# Patient Record
Sex: FEMALE | Race: BLACK OR AFRICAN AMERICAN | NOT HISPANIC OR LATINO | Employment: FULL TIME | URBAN - METROPOLITAN AREA
[De-identification: names, ages, dates, MRNs, and addresses within clinical notes are randomized per-mention and may not be internally consistent; named-entity substitution may affect disease eponyms.]

---

## 2018-02-13 ENCOUNTER — HOSPITAL ENCOUNTER (EMERGENCY)
Facility: HOSPITAL | Age: 48
Discharge: HOME | End: 2018-02-13
Attending: EMERGENCY MEDICINE | Admitting: EMERGENCY MEDICINE

## 2018-02-13 ENCOUNTER — APPOINTMENT (OUTPATIENT)
Dept: GENERAL RADIOLOGY | Facility: HOSPITAL | Age: 48
End: 2018-02-13

## 2018-02-13 VITALS
TEMPERATURE: 98 F | HEIGHT: 64 IN | BODY MASS INDEX: 31.07 KG/M2 | WEIGHT: 182 LBS | HEART RATE: 86 BPM | DIASTOLIC BLOOD PRESSURE: 78 MMHG | OXYGEN SATURATION: 99 % | RESPIRATION RATE: 20 BRPM | SYSTOLIC BLOOD PRESSURE: 115 MMHG

## 2018-02-13 DIAGNOSIS — J20.9 ACUTE BRONCHITIS, UNSPECIFIED ORGANISM: Primary | ICD-10-CM

## 2018-02-13 LAB
FLUAV AG NPH QL: NEGATIVE
FLUBV AG NPH QL IA: NEGATIVE

## 2018-02-13 PROCEDURE — 71046 X-RAY EXAM CHEST 2 VIEWS: CPT

## 2018-02-13 PROCEDURE — 99283 EMERGENCY DEPT VISIT LOW MDM: CPT

## 2018-02-13 PROCEDURE — 87804 INFLUENZA ASSAY W/OPTIC: CPT | Performed by: NURSE PRACTITIONER

## 2018-02-13 RX ORDER — AZITHROMYCIN 250 MG/1
TABLET, FILM COATED ORAL
Qty: 6 TABLET | Refills: 0 | OUTPATIENT
Start: 2018-02-13 | End: 2018-04-12

## 2018-02-13 NOTE — ED PROVIDER NOTES
EMERGENCY DEPARTMENT ENCOUNTER    CHIEF COMPLAINT  Chief Complaint: cough   History given by: pt   History limited by: none   Room Number: 50/50  PMD: No Known Provider      HPI:  Pt is a 47 y.o. female who presents complaining of productive cough with green-yellow sputum for the past 6 days. Pt also c/o HA, body aches, chest wall pain worse with cough, and reports a sore throat that is currently resolved. Pt denies N/V, rash, SOA or swelling of extremities. Pt states a Hx of partial hysterectomy, quit smoking 5 weeks ago.     Duration:  6 days   Onset: gradual   Timing: constant   Quality: productive with green-yellow sputum   Intensity/Severity: moderate   Progression: unchanged   Associated Symptoms: body aches, chest wall pain worse with cough, and sore throat   Aggravating Factors: none stated   Alleviating Factors: none stated   Previous Episodes: none stated   Treatment before arrival: none    PAST MEDICAL HISTORY  Active Ambulatory Problems     Diagnosis Date Noted   • No Active Ambulatory Problems     Resolved Ambulatory Problems     Diagnosis Date Noted   • No Resolved Ambulatory Problems     No Additional Past Medical History       PAST SURGICAL HISTORY  Past Surgical History:   Procedure Laterality Date   • APPENDECTOMY     •  SECTION     • FOOT SURGERY Bilateral    • PARTIAL HYSTERECTOMY     • THYROID SURGERY         FAMILY HISTORY  History reviewed. No pertinent family history.    SOCIAL HISTORY  Social History     Social History   • Marital status:      Spouse name: N/A   • Number of children: N/A   • Years of education: N/A     Occupational History   • Not on file.     Social History Main Topics   • Smoking status: Former Smoker   • Smokeless tobacco: Not on file   • Alcohol use No   • Drug use: No   • Sexual activity: Defer     Other Topics Concern   • Not on file     Social History Narrative   • No narrative on file       ALLERGIES  Review of patient's allergies indicates no known  allergies.    REVIEW OF SYSTEMS  Review of Systems   Constitutional: Negative for chills and fever.   HENT: Positive for sore throat (curently resolved). Negative for trouble swallowing.    Eyes: Negative for visual disturbance.   Respiratory: Positive for cough (with green-yellow sputum). Negative for shortness of breath.    Cardiovascular: Negative for chest pain, palpitations and leg swelling.   Gastrointestinal: Negative for abdominal pain, diarrhea and vomiting.   Endocrine: Negative.    Genitourinary: Negative for decreased urine volume, dysuria and frequency.   Musculoskeletal: Positive for myalgias (chest wall). Negative for neck pain.   Skin: Negative for rash.   Allergic/Immunologic: Negative.    Neurological: Positive for headaches. Negative for syncope, weakness and numbness.   Hematological: Negative.    Psychiatric/Behavioral: Negative.    All other systems reviewed and are negative.      PHYSICAL EXAM  ED Triage Vitals   Temp Heart Rate Resp BP SpO2   02/13/18 1100 02/13/18 1100 02/13/18 1100 02/13/18 1100 02/13/18 1100   98 °F (36.7 °C) 103 16 122/81 98 %      Temp src Heart Rate Source Patient Position BP Location FiO2 (%)   -- 02/13/18 1207 02/13/18 1207 02/13/18 1100 --    Monitor Sitting Right arm        Physical Exam   Constitutional: She is oriented to person, place, and time.  Non-toxic appearance. She appears distressed (mild).   HENT:   Head: Normocephalic and atraumatic.   Mouth/Throat: Oropharynx is clear and moist and mucous membranes are normal.   Eyes: EOM are normal.   Neck: Normal range of motion. Neck supple.   Cardiovascular: Normal rate, regular rhythm, normal heart sounds and intact distal pulses.    No murmur heard.  Pulses:       Posterior tibial pulses are 2+ on the right side, and 2+ on the left side.   Pulmonary/Chest: Effort normal and breath sounds normal. No respiratory distress.   O2 sats of 100% on room air   Abdominal: Soft. Bowel sounds are normal. There is no  tenderness. There is no rebound and no guarding.   Musculoskeletal: Normal range of motion. She exhibits no edema.   Neurological: She is alert and oriented to person, place, and time.   Skin: Skin is warm and dry.   Psychiatric: Affect normal.   Nursing note and vitals reviewed.      LAB RESULTS  Lab Results (last 24 hours)     Procedure Component Value Units Date/Time    Influenza Antigen, Rapid - Swab, Nasopharynx [[id number]]  (Normal) Collected:  02/13/18 1150    Specimen:  Swab from Nasopharynx Updated:  02/13/18 1237     Influenza A Ag, EIA Negative     Influenza B Ag, EIA Negative          I ordered the above labs and reviewed the results    RADIOLOGY  XR Chest 2 View   Final Result   No evidence for acute pulmonary process. Follow-up as   clinical indications persist.       This report was finalized on 2/13/2018 12:23 PM by Dr. Jorge Bass MD.               I ordered the above noted radiological studies. Interpreted by radiologist. Reviewed by me in PACS.     PROCEDURES  Procedures      PROGRESS AND CONSULTS  ED Course   1227  Discussed that we are waiting on the pt's CXR read and flu swab result but impression of viral illness. Plan to d/c the pt. Advised rest and hydration. Will prescribe the pt antibiotics due to her history of smoking. Pt understands and agrees with the plan, and all questions were answered.   1238  Pt's labs and CXR results are negative. Will d/c the pt as planned.     MEDICAL DECISION MAKING  Results were reviewed/discussed with the patient and they were also made aware of online access. Pt also made aware that some labs, such as cultures, will not be resulted during ER visit and follow up with PMD is necessary.     MDM  Number of Diagnoses or Management Options  Acute bronchitis, unspecified organism:      Amount and/or Complexity of Data Reviewed  Clinical lab tests: reviewed and ordered (Flu swab: negative)  Tests in the radiology section of CPT®: ordered and reviewed (CXR:  negative)    Patient Progress  Patient progress: stable         DIAGNOSIS  Final diagnoses:   Acute bronchitis, unspecified organism       DISPOSITION  DISCHARGE    Patient discharged in stable condition.    Reviewed implications of results, diagnosis, meds, responsibility to follow up, warning signs and symptoms of possible worsening, potential complications and reasons to return to ER.    Patient/Family voiced understanding of above instructions.    Discussed plan for discharge, as there is no emergent indication for admission.  Pt/family is agreeable and understands need for follow up and repeat testing.  Pt is aware that discharge does not mean that nothing is wrong but it indicates no emergency is present that requires admission and they must continue care with follow-up as given below or physician of their choice.     FOLLOW-UP  [place] [address]  [phone]  Call in 1 day  to set up a follow up appointment         Medication List      New Prescriptions          azithromycin 250 MG tablet   Commonly known as:  ZITHROMAX   Take 2 tablets the first day, then 1 tablet daily for 4 days.               Latest Documented Vital Signs:  As of 12:40 PM  BP- 118/86 HR- 82 Temp- 98 °F (36.7 °C) O2 sat- 99%    --  Documentation assistance provided by nasrin Rodriguez for Dr. Birch.  Information recorded by the scribjerry was done at my direction and has been verified and validated by me.     Trevor Rodriguez  02/13/18 1242  f     Allyson Birch MD  02/14/18 0015

## 2018-02-13 NOTE — DISCHARGE INSTRUCTIONS
You are advised to follow closely with your [facility] in 2-3 days for recheck, final results of lab work and imaging testing, and further testing/treatment as needed.      Drink pelnty of fluids  Alternate tylenol/ibuprofen as needed for fever/body aches    Please return to the emergency department immediately with chest pain different than usual for you, shortness of air, abdominal pain, persistent vomiting/fever, blood in emesis or stool, lightheadedness/fainting, problems with speech, one sided weakness/numbness, new incontinence, problems with vision, fever, nausea, vomiting or for worsening of symptoms or other concerns.

## 2018-02-13 NOTE — ED NOTES
"Patient states \"My nose is burning, my chest is congested and I've been coughing up a lot of phlegm and having hot flashes since Thursday.\"     Catalina Bender RN  02/13/18 1100    "

## 2018-02-13 NOTE — ED NOTES
Pt states since last Thursday she has had a headache, sore throat, and she is coughing up phlegm.   States that she is coughing up green phlegm.   States that her chest will hurt when she starts coughing.      Eduar Melgar RN  02/13/18 [id number]

## 2018-03-09 ENCOUNTER — HOSPITAL ENCOUNTER (EMERGENCY)
Facility: HOSPITAL | Age: 48
Discharge: HOME | End: 2018-03-09
Attending: EMERGENCY MEDICINE | Admitting: EMERGENCY MEDICINE

## 2018-03-09 ENCOUNTER — APPOINTMENT (OUTPATIENT)
Dept: GENERAL RADIOLOGY | Facility: HOSPITAL | Age: 48
End: 2018-03-09

## 2018-03-09 VITALS
WEIGHT: 183 LBS | RESPIRATION RATE: 16 BRPM | BODY MASS INDEX: 31.24 KG/M2 | SYSTOLIC BLOOD PRESSURE: 106 MMHG | HEIGHT: 64 IN | HEART RATE: 88 BPM | OXYGEN SATURATION: 98 % | TEMPERATURE: 98.3 F | DIASTOLIC BLOOD PRESSURE: 78 MMHG
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
## 2018-03-09 DIAGNOSIS — T24.211A PARTIAL THICKNESS BURN OF RIGHT HIP, INITIAL ENCOUNTER: Primary | ICD-10-CM

## 2018-03-09 DIAGNOSIS — S80.02XA CONTUSION OF LEFT KNEE, INITIAL ENCOUNTER: ICD-10-CM

## 2018-03-09 PROCEDURE — 73560 X-RAY EXAM OF KNEE 1 OR 2: CPT

## 2018-03-09 PROCEDURE — 90715 TDAP VACCINE 7 YRS/> IM: CPT | Performed by: EMERGENCY MEDICINE

## 2018-03-09 PROCEDURE — 25010000002 TDAP 5-2.5-18.5 LF-MCG/0.5 SUSPENSION: Performed by: EMERGENCY MEDICINE

## 2018-03-09 PROCEDURE — 99284 EMERGENCY DEPT VISIT MOD MDM: CPT

## 2018-03-09 PROCEDURE — 90471 IMMUNIZATION ADMIN: CPT | Performed by: EMERGENCY MEDICINE

## 2018-03-09 RX ORDER — OXYCODONE HYDROCHLORIDE AND ACETAMINOPHEN 5; 325 MG/1; MG/1
1 TABLET ORAL EVERY 4 HOURS PRN
Qty: 14 TABLET | Refills: 0 | Status: SHIPPED | OUTPATIENT
Start: 2018-03-09 | End: 2018-04-25

## 2018-03-09 RX ORDER — OXYCODONE HYDROCHLORIDE AND ACETAMINOPHEN 5; 325 MG/1; MG/1
2 TABLET ORAL ONCE
Status: COMPLETED | OUTPATIENT
Start: 2018-03-09 | End: 2018-03-09

## 2018-03-09 RX ADMIN — TETANUS TOXOID, REDUCED DIPHTHERIA TOXOID AND ACELLULAR PERTUSSIS VACCINE, ADSORBED 0.5 ML: 5; 2.5; 8; 8; 2.5 SUSPENSION INTRAMUSCULAR at 17:22

## 2018-03-09 RX ADMIN — OXYCODONE HYDROCHLORIDE AND ACETAMINOPHEN 2 TABLET: 5; 325 TABLET ORAL at 16:24

## 2018-03-09 RX ADMIN — SILVER SULFADIAZINE 1 APPLICATION: 10 CREAM TOPICAL at 16:45

## 2018-03-09 NOTE — ED PROVIDER NOTES
EMERGENCY DEPARTMENT ENCOUNTER    CHIEF COMPLAINT  Chief Complaint: Hip injury  History given by: Pt  History limited by: nothing  Room Number: 50/50  PMD: No Known Provider      HPI:  Pt is a 47 y.o. female who presents complaining of R hip burn and left knee pain while at work at here at [employer]. Pt states that she was heating up lasagna, it fell and pt tried to catch it. The lasagna fell and burnt her R hip. She then slipped and fell, landing on her L knee. Pt states that her tetanus shot has been more than ten years ago.       PAST MEDICAL HISTORY  Active Ambulatory Problems     Diagnosis Date Noted   • No Active Ambulatory Problems     Resolved Ambulatory Problems     Diagnosis Date Noted   • No Resolved Ambulatory Problems     No Additional Past Medical History       PAST SURGICAL HISTORY  Past Surgical History:   Procedure Laterality Date   • APPENDECTOMY     •  SECTION     • FOOT SURGERY Bilateral    • PARTIAL HYSTERECTOMY     • THYROID SURGERY         FAMILY HISTORY  History reviewed. No pertinent family history.    SOCIAL HISTORY  Social History     Social History   • Marital status:      Spouse name: N/A   • Number of children: N/A   • Years of education: N/A     Occupational History   • Not on file.     Social History Main Topics   • Smoking status: Former Smoker   • Smokeless tobacco: Not on file   • Alcohol use No   • Drug use: No   • Sexual activity: Defer     Other Topics Concern   • Not on file     Social History Narrative       ALLERGIES  Review of patient's allergies indicates no known allergies.        REVIEW OF SYSTEMS  Review of Systems   Constitutional: Negative for fever.   Respiratory: Negative for shortness of breath.    Cardiovascular: Negative for chest pain.   Musculoskeletal: Positive for arthralgias (L knee pain).   Skin: Positive for wound (R hip burn).     PHYSICAL EXAM  ED Triage Vitals   Temp Heart Rate Resp BP SpO2   18 1500 18 1500 18  1508 03/09/18 1525 03/09/18 1500   98.3 °F (36.8 °C) 101 20 108/84 99 %      Temp src Heart Rate Source Patient Position BP Location FiO2 (%)   03/09/18 1500 03/09/18 1525 03/09/18 1525 03/09/18 1525 --   Tympanic Monitor Lying Right arm        Physical Exam   Constitutional: She is oriented to person, place, and time. She appears distressed (mild).   Eyes: EOM are normal.   Neck: Normal range of motion.   Cardiovascular: Normal rate and regular rhythm.    Pulmonary/Chest: Effort normal and breath sounds normal. No respiratory distress.   Musculoskeletal:        Left hip: She exhibits no tenderness and no bony tenderness.        Left knee: She exhibits swelling. Tenderness (anterior) found.   No calf tenderness  Pain with flexion of L knee   Neurological: She is alert and oriented to person, place, and time. She has normal sensation and normal strength. She has a normal Straight Leg Raise Test (L).   Skin: Skin is warm and dry. Abrasion and burn ([id number] superficial partial thickness) noted.   Small abrasion to anterior L knee.    Psychiatric: Affect normal.   Nursing note and vitals reviewed.      RADIOLOGY  XR Knee 1 or 2 View Left              I ordered the above noted radiological studies. Interpreted by radiologist. Reviewed by me in PACS.       PROCEDURES  Procedures      PROGRESS AND CONSULTS/MEDICAL DECISION MAKING  ED Course     1530:  Ordered XR L knee for further evaluation.     1548:  Notified pt of plan to order pain medication and imaging for further evaluation. Pt understands and agrees with the plan, all questions answered.     1559:  Ordered silvadene and percocet for pt's pain.     1616:  Ordered Kristin report prior to disposition.     1619:  Ordered hinged knee brace. Per RN, pt does not want crutches.     1631:  KRISTIN query complete. Treatment plan to include limited course of prescribed  controlled substance. Risks including addiction, benefits, and alternatives presented to patient. KRISTIN is  negative.    1632:  Pt rechecked. Pt resting comfortably and pain has improved since administration of pain medication. Notified pt of unremarkable imaging results and plan to discharge pt home with pain medication. Pt understands and agrees with the plan, all questions answered.    Results were reviewed/discussed with the patient and they were also made aware of online access. Pt also made aware that some labs, such as cultures, will not be resulted during ER visit and follow up with PMD is necessary.     MDM  Number of Diagnoses or Management Options  Contusion of left knee, initial encounter:   Fall, initial encounter:   Partial thickness burn of right hip, initial encounter:      Amount and/or Complexity of Data Reviewed  Tests in the radiology section of CPT®: ordered and reviewed (XR L knee shows no acute findings. )  Independent visualization of images, tracings, or specimens: yes    Patient Progress  Patient progress: stable           DIAGNOSIS  Final diagnoses:   Partial thickness burn of right hip, initial encounter   Contusion of left knee, initial encounter   Fall, initial encounter         DISPOSITION  DISCHARGE    Patient discharged in stable condition.    Reviewed implications of results, diagnosis, meds, responsibility to follow up, warning signs and symptoms of possible worsening, potential complications and reasons to return to ER.    Patient/Family voiced understanding of above instructions.    Discussed plan for discharge, as there is no emergent indication for admission.  Pt/family is agreeable and understands need for follow up and repeat testing.  Pt is aware that discharge does not mean that nothing is wrong but it indicates no emergency is present that requires admission and they must continue care with follow-up as given below or physician of their choice.     FOLLOW-UP  [facility]    Schedule an appointment as soon as possible for a visit in 3 days           Medication List      New  Prescriptions          oxyCODONE-acetaminophen 5-325 MG per tablet   Commonly known as:  PERCOCET   Take 1 tablet by mouth Every 4 (Four) Hours As Needed for Severe Pain .       silver sulfadiazine 1 % cream   Commonly known as:  SILVADENE, SSD   Apply  topically 2 (Two) Times a Day.         Stop          azithromycin 250 MG tablet   Commonly known as:  ZITHROMAX               Latest Documented Vital Signs:  As of 3:44 PM  BP- 108/84 HR- 92 Temp- 98.3 °F (36.8 °C) (Tympanic) O2 sat- 99%  --  Documentation assistance provided by nasrin Crespo for Dr. Mc.  Information recorded by the scribe was done at my direction and has been verified and validated by me.     Consuelo Crespo  03/09/18 5529       Mu Mc MD  03/09/18 7339

## 2018-03-09 NOTE — DISCHARGE INSTRUCTIONS
Use ice on your knee as needed.  Use silvadene on your burn twice a day and keep the wound clean, dry and covered.  Use pain medication as needed for pain. You can use Ibuprofen on top of or in place of the percocet.

## 2018-03-09 NOTE — ED TRIAGE NOTES
Pt states she was pushing taraa on the rack and it fell she tried to catch it and then she slipped on it. Reports left knee pain and rt hip burning, mild burn quarter size on right hip. Denies head injury

## 2018-03-30 ENCOUNTER — TRANSCRIBE ORDERS (OUTPATIENT)
Dept: ADMINISTRATIVE | Facility: HOSPITAL | Age: 48
End: 2018-03-30

## 2018-03-30 DIAGNOSIS — M25.562 LEFT KNEE PAIN, UNSPECIFIED CHRONICITY: Primary | ICD-10-CM

## 2018-04-11 ENCOUNTER — HOSPITAL ENCOUNTER (OUTPATIENT)
Dept: MRI IMAGING | Facility: HOSPITAL | Age: 48
Discharge: HOME | End: 2018-04-11
Admitting: PHYSICAL MEDICINE & REHABILITATION

## 2018-04-11 DIAGNOSIS — M25.562 LEFT KNEE PAIN, UNSPECIFIED CHRONICITY: ICD-10-CM

## 2018-04-11 PROCEDURE — 73721 MRI JNT OF LWR EXTRE W/O DYE: CPT

## 2018-04-25 ENCOUNTER — OFFICE VISIT (OUTPATIENT)
Dept: ORTHOPEDIC SURGERY | Facility: CLINIC | Age: 48
End: 2018-04-25

## 2018-04-25 VITALS — WEIGHT: 186 LBS | BODY MASS INDEX: 31.76 KG/M2 | HEIGHT: 64 IN | TEMPERATURE: 98.1 F

## 2018-04-25 DIAGNOSIS — S89.92XA INJURY OF LEFT KNEE, INITIAL ENCOUNTER: Primary | ICD-10-CM

## 2018-04-25 PROCEDURE — 99203 OFFICE O/P NEW LOW 30 MIN: CPT | Performed by: ORTHOPAEDIC SURGERY

## 2018-04-25 RX ORDER — TRAMADOL HYDROCHLORIDE 50 MG/1
50 TABLET ORAL EVERY 12 HOURS PRN
Qty: 40 TABLET | Refills: 0 | OUTPATIENT
Start: 2018-04-25 | End: 2018-09-21

## 2018-04-25 RX ORDER — NAPROXEN SODIUM 220 MG
220 TABLET ORAL 2 TIMES DAILY PRN
COMMUNITY
End: 2019-10-11

## 2018-04-25 NOTE — PROGRESS NOTES
Chief Complaint   Patient presents with   • Left Knee - Establish Care             HPI patient here for new patient appt for left knee Which she injured while she was at work.  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  Her date of injury was 2018.  She slipped on the floor and fell directly on the anterior aspect of the knee.  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  She developed significant swelling and bruising anteriorly.  She finds it difficult to bear weight with the knee in full extension.  The patient states that she has a burning type sensation in the retropatellar region.  She has difficulty in kneeling on the ground.  She does not have any instability of the knee.  Most of the pain is over the anterior aspect of the knee.  She's had an MRI which does not show any injury to the ACL or the MCL.          No Known Allergies      Social History     Social History   • Marital status:      Spouse name: N/A   • Number of children: N/A   • Years of education: N/A     Occupational History   • Not on file.     Social History Main Topics   • Smoking status: Former Smoker   • Smokeless tobacco: Never Used   • Alcohol use No   • Drug use: No   • Sexual activity: Defer     Other Topics Concern   • Not on file     Social History Narrative   • No narrative on file       History reviewed. No pertinent family history.    Past Surgical History:   Procedure Laterality Date   • APPENDECTOMY     •  SECTION     • FOOT SURGERY Bilateral    • PARTIAL HYSTERECTOMY     • THYROID SURGERY         History reviewed. No pertinent past medical history.        Vitals:    18 0924   Temp: 98.1 °F (36.7 °C)             Review of Systems   Constitutional: Negative.  Negative for chills, fever and unexpected weight change.   HENT: Negative.  Negative for trouble swallowing and voice change.    Eyes:  Negative.  Negative for visual disturbance.   Respiratory: Negative.  Negative for cough and shortness of breath.    Cardiovascular: Negative.  Negative for chest pain and leg swelling.   Gastrointestinal: Negative.  Negative for abdominal pain, nausea and vomiting.   Endocrine: Negative.  Negative for cold intolerance and heat intolerance.   Genitourinary: Negative.  Negative for difficulty urinating, frequency and urgency.   Musculoskeletal: Positive for gait problem and joint swelling.   Skin: Negative.  Negative for rash and wound.   Allergic/Immunologic: Negative.  Negative for immunocompromised state.   Neurological: Negative for weakness and numbness.   Hematological: Negative.  Does not bruise/bleed easily.   Psychiatric/Behavioral: Positive for sleep disturbance. Negative for dysphoric mood. The patient is not nervous/anxious.            Physical Exam   Constitutional: She is oriented to person, place, and time. She appears well-developed and well-nourished.   HENT:   Head: Normocephalic and atraumatic.   Right Ear: External ear normal.   Left Ear: External ear normal.   Nose: Nose normal.   Mouth/Throat: Oropharynx is clear and moist.   Eyes: Conjunctivae and EOM are normal. Pupils are equal, round, and reactive to light.   Neck: Normal range of motion. Neck supple.   Cardiovascular: Normal rate, regular rhythm, normal heart sounds and intact distal pulses.    Pulmonary/Chest: Effort normal and breath sounds normal.   Abdominal: Soft. Bowel sounds are normal.   Musculoskeletal: Normal range of motion.   Neurological: She is alert and oriented to person, place, and time. She has normal reflexes.   Skin: Skin is warm.   Psychiatric: She has a normal mood and affect. Her behavior is normal. Judgment and thought content normal.   Nursing note and vitals reviewed.              Joint/Body Part Specific Exam:  left knee. The knee is swollen. The patella is ballotable. There is thickening of the synovial membrane.  There appears to be a fluid flow in the supra-patellar space. The patient's knee feels tight in flexion. Range of motion is restricted because of the limited flexion. There is some quadriceps inhibition on account of the distension of the joint. There is diffuse tenderness around the knee. The popliteal fossa is full and tender as well. No evidence of a compartmental syndrome is noted. Anterior and posterior drawer signs are negative. No medial or lateral instability is noted. Pivot shift sign is negative. Dorsalis pedis and posterior tibial artery pulses are palpable. Common peroneal nerve function is well preserved.  She has a boggy swelling over the anterior aspect of the knee over the prepatellar bursa.  There is some bruising present in this location as well.  This bruise seems to be resolving.          X-RAY Report:  X-rays AP and lateral reviewed from the Epic systems show a fairly normal contour of the knee joint.  There is no fracture noted.  There is some increased fluid in the prepatellar region.          Diagnostics:  Grover reports and images from the hospital are reviewed.  There is some edema in the subcutaneous fat over the knee with a poorly demarcated signal.  There is a hematoma in this location which is 27 mm x 23 mm x 9 mm.  There is chronic chondral loss of the patella.  The medial and lateral menisci are intact.  There is no evidence of a tear of the ACL or the MCL.  Conservative line of management approach has been proposed to the patient based on the findings of the images on the MRI.          Eduarda was seen today for establish care.    Diagnoses and all orders for this visit:    Injury of left knee, initial encounter  -     Ambulatory Referral to Physical Therapy Evaluate and treat    Other orders  -     traMADol (ULTRAM) 50 MG tablet; Take 1 tablet by mouth Every 12 (Twelve) Hours As Needed for Moderate Pain .            Procedures          I provided this patient with educational  materials regarding exercise and bone health.        Plan:   Nonoperative care discussed with the patient.    Stretching and strengthening exercises of the quads and the hamstrings.    Note for physical therapy given to the patient on an outpatient basis.    Local application of an Ace wrap to the area to decrease the swelling.    Ice to the knee for comfort.    Tablet ibuprofen 600 mg orally twice a day for pain swelling and discomfort.    Tablet Ultram 50 mg tab 1 by mouth every 12 for breakthrough pain and discomfort.    Re-injury precautions.    Note for this patient to be off work given to her because she states that she cannot go back to work with the knee in its present condition.    Follow-up in my office in 4 weeks for reevaluation.    Controlled substance treatment options discussed in detail. Patient's signed consent to medical options on file. KRISTIN form in chart.  The patient is being provided this narcotic prescription to address the acute medical condition that they are undergoing/experiencing at this time.  It is my medical and surgical assessment that more than 3 days of narcotic medication is necessary to help control the pain and discomfort that this patient is experiencing at this point.  Risks of narcotic medication usage outlined.  Possibility of physical and psychological dependence and abuse, especially long term, emphasized to the patient.  I have explained to the patient that we will try to wean them off the narcotic medication as soon as possible and introduce non-narcotic modalities of pain control.  At this point in the patient's clinical spectrum, however, alternative available treatments are inadequate to control their pain and symptoms.  I have also discussed the possibility of random drug testing as well as pill counts to prevent misuse and misappropriation of the narcotic medications prescribed to the patient.            CC To No Known Provider

## 2018-05-02 ENCOUNTER — TREATMENT (OUTPATIENT)
Dept: PHYSICAL THERAPY | Facility: CLINIC | Age: 48
End: 2018-05-02

## 2018-05-02 DIAGNOSIS — M25.562 ACUTE PAIN OF LEFT KNEE: Primary | ICD-10-CM

## 2018-05-02 PROCEDURE — 97110 THERAPEUTIC EXERCISES: CPT | Performed by: PHYSICAL THERAPIST

## 2018-05-02 PROCEDURE — 97001 PR PHYS THERAPY EVALUATION: CPT | Performed by: PHYSICAL THERAPIST

## 2018-05-02 PROCEDURE — 97014 ELECTRIC STIMULATION THERAPY: CPT | Performed by: PHYSICAL THERAPIST

## 2018-05-02 NOTE — PROGRESS NOTES
Physical Therapy Initial Evaluation and Plan of Care        Subjective Evaluation    History of Present Illness  Mechanism of injury: Patient presents to therapy with left knee pain which she injured while she was at work.  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  She fell directly onto her knee when pulling a  tall boy and 11 trays of lasagna.   She developed significant swelling and bruising anteriorly.  She reports a burning sensation at the base of her patella that is constant in nature. She reports that she is having a difficult time walking as needed as well as stairclimbing and squatting.       Patient Occupation: cook at [employer]    Precautions and Work Restrictions: Not currently workingQuality of life: good    Pain  Quality: throbbing, sharp and burning  Relieving factors: ice (has been having trouble filling her prescription)  Aggravating factors: squatting, prolonged positioning, sleeping, repetitive movement, movement, standing and ambulation  Progression: worsening    Treatments  Previous treatment: medication  Patient Goals  Patient goals for therapy: independence with ADLs/IADLs, increased strength, return to work, return to sport/leisure activities, increased motion and decreased pain             Objective       Palpation     Additional Palpation Details  hypersensitive at mid patella    Tenderness   Left Knee   Tenderness in the lateral patella, medial patella and patellar tendon.     Active Range of Motion   Left Knee   Flexion: 80 degrees with pain  Extension: 0 degrees     Strength/Myotome Testing     Left Knee   Quadriceps contraction: fair    Tests     Additional Tests Details  Unable to perform formalized special testing due to hypersensitivity at knee.     Ambulation   Weight-Bearing Status   Weight-Bearing Status (Left): full weight bearing   Assistive device used: none    Additional Weight-Bearing Status Details  Mild decrease in stance  time on left.          Assessment & Plan     Assessment  Impairments: abnormal or restricted ROM, activity intolerance, lacks appropriate home exercise program and pain with function  Assessment details: Patient is a 47 year old female who presents with increased left knee pain.  Upon assessment she has mild decrease in stance time during ambulation on the left.  She has 80 degrees knee flexion in supine and >90 degrees in sitting. Full extension in supine and long sit with no subjective pain.   Fair quad recruitment on left.  She was hypersensitive at patella with palpation. Special testing was not performed at initial evaluation due to hypersensitivity.    Prognosis: good  Prognosis details: Long Term Goals (6 weeks)    Patient is able to return to work/community activities with minimal to no discomfort  Patient is able to lift 45 lbs from floor to waist  Patient is able to push/pull 50 lbs for 100 feet.   Patient is able stand for as long as needed for work/community related tasks.   Patient is able to sit for as long as needed for work/community related tasks.   Patient is able to reciprocally climb steps as needed for daily tasks.       Short Term Goals (3 weeks)    Patient is independent with HEP  Patient is able to sleep without being disrupted by pain.   Patient has full AROM/PROM of left knee  Patient has greater than 50% improvement overall.                 Functional Limitations: lifting, sleeping, walking, uncomfortable because of pain, standing and stooping  Plan  Therapy options: will be seen for skilled physical therapy services  Planned modality interventions: TENS, ultrasound, thermotherapy (hydrocollator packs) and cryotherapy  Planned therapy interventions: manual therapy, soft tissue mobilization, strengthening, stretching, therapeutic activities, joint mobilization, home exercise program, functional ROM exercises, flexibility and body mechanics training  Treatment plan discussed with:  patient          Therapeutic Exercise:    20     mins  17475;       Timed Treatment:   20   mins   Total Treatment:     55   mins    PT SIGNATURE: Akila Rosas, PT   DATE TREATMENT INITIATED: 5/2/2018    Initial Certification  Certification Period: 7/31/2018  I certify that the therapy services are furnished while this patient is under my care.  The services outlined above are required by this patient, and will be reviewed every 90 days.     PHYSICIAN: Noman Salamanca MD      DATE:     Please sign and return via fax to [phone].. Thank you, [facility].

## 2018-05-04 ENCOUNTER — TREATMENT (OUTPATIENT)
Dept: PHYSICAL THERAPY | Facility: CLINIC | Age: 48
End: 2018-05-04

## 2018-05-04 DIAGNOSIS — M25.562 ACUTE PAIN OF LEFT KNEE: Primary | ICD-10-CM

## 2018-05-04 PROCEDURE — 97014 ELECTRIC STIMULATION THERAPY: CPT | Performed by: PHYSICAL THERAPIST

## 2018-05-04 PROCEDURE — 97110 THERAPEUTIC EXERCISES: CPT | Performed by: PHYSICAL THERAPIST

## 2018-05-04 NOTE — PROGRESS NOTES
Physical Therapy Daily Progress Note            Subjective   Same. Pain with prolonged sit    Objective   See Exercise, Manual, and Modality Logs for complete treatment.       Assessment/Plan      No notable improvement subjectively but able to progress strengthening without increase in overall pain    Continue to progress as aileen per POC           Manual Therapy:    0     mins  17049;  Therapeutic Exercise:    24     mins  97126;     Neuromuscular Rodney:    0    mins  93269;    Therapeutic Activity:     0     mins  03081;     Gait Trainin     mins  27979;     Ultrasound:     0     mins  53529;    Work Hardening           0      mins 34187  Iontophoresis               0   mins 90613    Timed Treatment:   24   mins   Total Treatment:     43   mins    Leonora Yoder PT  Physical Therapist  
Private Vehicle

## 2018-05-07 ENCOUNTER — TREATMENT (OUTPATIENT)
Dept: PHYSICAL THERAPY | Facility: CLINIC | Age: 48
End: 2018-05-07

## 2018-05-07 PROCEDURE — 97014 ELECTRIC STIMULATION THERAPY: CPT | Performed by: PHYSICAL THERAPIST

## 2018-05-07 PROCEDURE — 97530 THERAPEUTIC ACTIVITIES: CPT | Performed by: PHYSICAL THERAPIST

## 2018-05-07 PROCEDURE — 97110 THERAPEUTIC EXERCISES: CPT | Performed by: PHYSICAL THERAPIST

## 2018-05-07 NOTE — PROGRESS NOTES
Physical Therapy Daily Progress Note            Subjective   Doing alright    Objective   See Exercise, Manual, and Modality Logs for complete treatment.   No notable antalgia    Assessment/Plan    Good progress as able to advance ex including WB without c/o inc pain.  Required mod cueing for proper squat and pivot transfer    Continue to progress as aileen             Manual Therapy:    0     mins  97316;  Therapeutic Exercise:    25     mins  90780;     Neuromuscular Rodney:    0    mins  00895;    Therapeutic Activity:     10     mins  00403;     Gait Trainin     mins  90892;     Ultrasound:     0     mins  44775;    Work Hardening           0      mins 99526  Iontophoresis               0   mins 17248    Timed Treatment:   35   mins   Total Treatment:     53   mins    Leonora Yoder PT  Physical Therapist

## 2018-05-11 ENCOUNTER — TREATMENT (OUTPATIENT)
Dept: PHYSICAL THERAPY | Facility: CLINIC | Age: 48
End: 2018-05-11

## 2018-05-11 DIAGNOSIS — M25.562 ACUTE PAIN OF LEFT KNEE: Primary | ICD-10-CM

## 2018-05-11 PROCEDURE — 97530 THERAPEUTIC ACTIVITIES: CPT | Performed by: PHYSICAL THERAPIST

## 2018-05-11 PROCEDURE — 97110 THERAPEUTIC EXERCISES: CPT | Performed by: PHYSICAL THERAPIST

## 2018-05-11 PROCEDURE — 97014 ELECTRIC STIMULATION THERAPY: CPT | Performed by: PHYSICAL THERAPIST

## 2018-05-11 NOTE — PROGRESS NOTES
Physical Therapy Daily Progress Note            Subjective   I'm in pain right here (inf pat).  Hurts to touch it.    Objective   See Exercise, Manual, and Modality Logs for complete treatment.       Assessment/Plan    Able to complete more reps than instructed on most ex despite report of increased pain at patella    Progress as aileen             Manual Therapy:    0     mins  08985;  Therapeutic Exercise:    25     mins  76319;     Neuromuscular Rodney:     0    mins  16174;    Therapeutic Activity:     8     mins  22053;     Gait Trainin     mins  45336;     Ultrasound:     0     mins  84459;    Work Hardening           0      mins 32687  Iontophoresis               0   mins 06847    Timed Treatment:   33   mins   Total Treatment:     51   mins    Leonora Yoder, PT  Physical Therapist

## 2018-05-14 ENCOUNTER — TREATMENT (OUTPATIENT)
Dept: PHYSICAL THERAPY | Facility: CLINIC | Age: 48
End: 2018-05-14

## 2018-05-14 DIAGNOSIS — M25.562 ACUTE PAIN OF LEFT KNEE: Primary | ICD-10-CM

## 2018-05-14 PROCEDURE — 97530 THERAPEUTIC ACTIVITIES: CPT | Performed by: PHYSICAL THERAPIST

## 2018-05-14 PROCEDURE — 97014 ELECTRIC STIMULATION THERAPY: CPT | Performed by: PHYSICAL THERAPIST

## 2018-05-14 PROCEDURE — 97110 THERAPEUTIC EXERCISES: CPT | Performed by: PHYSICAL THERAPIST

## 2018-05-14 NOTE — PROGRESS NOTES
Physical Therapy Daily Progress Note            Subjective   Knee hurt while sitting in [employer] yesterday    Objective   See Exercise, Manual, and Modality Logs for complete treatment.       Assessment/Plan    Performed all ex without c/o pain, including with increased step height but continues with subjective report of mod-severe pain with ADLs    Continue to progress as aileen - try balance activities             Manual Therapy:    0     mins  29339;  Therapeutic Exercise:    24     mins  75853;     Neuromuscular Rodney:    0    mins  22134;    Therapeutic Activity:     9     mins  17013;     Gait Trainin     mins  15248;     Ultrasound:     0     mins  19678;    Work Hardening           0      mins 29554  Iontophoresis               0   mins 14182    Timed Treatment:   33   mins   Total Treatment:     51   mins    Leonora Yoder PT  Physical Therapist

## 2018-05-18 ENCOUNTER — TREATMENT (OUTPATIENT)
Dept: PHYSICAL THERAPY | Facility: CLINIC | Age: 48
End: 2018-05-18

## 2018-05-18 DIAGNOSIS — M25.562 ACUTE PAIN OF LEFT KNEE: Primary | ICD-10-CM

## 2018-05-18 PROCEDURE — 97014 ELECTRIC STIMULATION THERAPY: CPT | Performed by: PHYSICAL THERAPIST

## 2018-05-18 PROCEDURE — 97110 THERAPEUTIC EXERCISES: CPT | Performed by: PHYSICAL THERAPIST

## 2018-05-18 NOTE — PROGRESS NOTES
Physical Therapy Daily Progress Note            Subjective   Pain is a 12 today vs a 10.  Sat on the couch all day yesterday. Reports no problem with exercise increases last session.    Objective   See Exercise, Manual, and Modality Logs for complete treatment.       Assessment/Plan    Severe pain persists per subjective report yet able to complete all exercises except deferred squat quickly without notable pain     Assess for ret to MD next session             Manual Therapy:    0     mins  56095;  Therapeutic Exercise:    29     mins  03054;     Neuromuscular Rodney:    0    mins  54472;    Therapeutic Activity:     0     mins  35280;     Gait Trainin     mins  25137;     Ultrasound:     0     mins  72173;    Work Hardening           0      mins 06439  Iontophoresis               0   mins 07709    Timed Treatment:   29   mins   Total Treatment:     49   mins    Leonora Yoder PT  Physical Therapist

## 2018-05-21 ENCOUNTER — TREATMENT (OUTPATIENT)
Dept: PHYSICAL THERAPY | Facility: CLINIC | Age: 48
End: 2018-05-21

## 2018-05-21 PROCEDURE — 97110 THERAPEUTIC EXERCISES: CPT | Performed by: PHYSICAL THERAPIST

## 2018-05-21 PROCEDURE — 97014 ELECTRIC STIMULATION THERAPY: CPT | Performed by: PHYSICAL THERAPIST

## 2018-05-21 PROCEDURE — 97530 THERAPEUTIC ACTIVITIES: CPT | Performed by: PHYSICAL THERAPIST

## 2018-05-21 NOTE — PROGRESS NOTES
Physical Therapy  Progress Note        2018  Noman Salamanca MD    Re: Christy Verner  ________________________________________________________________    Ms. Christy Verner, has attended 7/9 PT sessions.  Treatment has consisted of: progressive there-ex/act, HEP, pt ed and modalities     S: Ms. Christy Verner states: knee is about the same; pain with any prolonged walking        Subjective Evaluation    Pain  At best pain ratin  At worst pain ratin  Location: supra-pat  Quality: burning           Objective       Palpation     Additional Palpation Details  hypersensitive at mid patella    Tenderness   Left Knee   Tenderness in the patellar tendon. No tenderness in the lateral patella and medial patella.     Active Range of Motion   Left Knee   Flexion: 118 degrees with pain  Extension: 0 degrees     Patellar Mobility   Left Knee Hypomobile in the left medial, left lateral, left superior and left inferior patellar tendon(s).     Additional Patellar Mobility Details  Apprehensive    Strength/Myotome Testing     Left Knee   Flexion: 5  Extension: 4+  Quadriceps contraction: fair    Tests     Left Knee   Positive patellar apprehension.   Negative anterior drawer and posterior sag.     Additional Tests Details  + crepitus PFJ    Ambulation   Weight-Bearing Status   Weight-Bearing Status (Left): full weight bearing   Assistive device used: none    Additional Weight-Bearing Status Details  No notable antalgia    Functional Assessment     Comments  Tolerates ambulating 6 inch steps reciprocally; tolerates squat to lift 10 lbs but reports knee pain at 15 lbs     See Exercise, Manual, and Modality Logs for complete treatment.       Assessment/Plan    ROM and gait improved but mod-severe pain and tenderness persist.  Assessment limited due to hypersensitivity.  Pain focused at PFJ without signs/sxs of meniscus or lig tears.    To MD             Manual Therapy:    0     mins  87725;  Therapeutic Exercise:    28      mins  52195;     Neuromuscular Rodney:    0    mins  79630;    Therapeutic Activity:     9     mins  03104;     Gait Trainin     mins  66895;     Ultrasound:     0     mins  82139;    Work Hardening           0      mins 04781  Iontophoresis               0   mins 58840    Timed Treatment:   37   mins   Total Treatment:     55   mins    Leonora Yoder PT  Physical Therapist

## 2018-05-23 ENCOUNTER — OFFICE VISIT (OUTPATIENT)
Dept: ORTHOPEDIC SURGERY | Facility: CLINIC | Age: 48
End: 2018-05-23

## 2018-05-23 DIAGNOSIS — S89.92XD INJURY OF LEFT KNEE, SUBSEQUENT ENCOUNTER: Primary | ICD-10-CM

## 2018-05-23 PROCEDURE — 99214 OFFICE O/P EST MOD 30 MIN: CPT | Performed by: ORTHOPAEDIC SURGERY

## 2018-05-23 RX ORDER — IBUPROFEN 600 MG/1
600 TABLET ORAL DAILY
Qty: 90 TABLET | Refills: 0 | Status: SHIPPED | OUTPATIENT
Start: 2018-05-23 | End: 2019-11-04 | Stop reason: ALTCHOICE

## 2018-05-23 NOTE — PROGRESS NOTES
Chief Complaint   Patient presents with   • Left Knee - Follow-up           HPI the patient is here today for a 4 weeks follow up of her left knee. Patient is doing a little bit better in terms of improved range of motion and decreased pain and swelling.  She states that her flexion is improved significantly.  Physical therapy has been very helpful for her.  She still has a burning sensation in the retropatellar region and over the anterior tibial region at the site of the impact when she sustained her injury.  Feels that she is not quite ready to go back to work just yet but will be ready to do so in the next week or so.  Unfortunately, her Workmen's Compensation carrier did not reimburse her with a medical card for the drugs that we had prescribed for her.  She is trying to get by with some over-the-counter medication including ibuprofen and I have discussed with her about making due with simple anti-inflammatory medication.  She is not asking me for any narcotic medication to control her knee pain at this point.        There were no vitals filed for this visit.        Review of Systems   Constitutional: Negative.    HENT: Negative.    Eyes: Negative.    Respiratory: Negative.    Cardiovascular: Negative.    Gastrointestinal: Negative.    Endocrine: Negative.    Genitourinary: Negative.    Musculoskeletal: Positive for gait problem and joint swelling.   Skin: Negative.    Allergic/Immunologic: Negative.    Hematological: Negative.    Psychiatric/Behavioral: Negative.            Physical Exam   Constitutional: She appears well-nourished.   HENT:   Head: Atraumatic.   Eyes: EOM are normal.   Neck: Neck supple.   Cardiovascular: Normal rate and intact distal pulses.    Pulmonary/Chest: Breath sounds normal.   Abdominal: Bowel sounds are normal.   Musculoskeletal: She exhibits edema and tenderness.   Neurological: She is alert.   Skin: Skin is warm. Capillary refill takes 2 to 3 seconds.   Psychiatric: She has a  normal mood and affect. Her behavior is normal. Judgment and thought content normal.   Nursing note and vitals reviewed.          Joint/Body Part Specific Exam:  left knee. The patients’ patellar grind test is mildly postive.  A mild amount of pain and discomfort in the retropatellar area. The patient has high Q-angle. Range of motion is from 0- 125° degrees of flexion. Anterior and posterior drawer signs are negative. No medial or lateral instability is noted. The patella tends to track laterally in high grades of flexion. A Slightly positive apprehension sign is noted. There is some tenderness over the medial patellofemoral ligaments. Skin and soft tissues are swollen; consistent with inflammatory changes of the patellofemoral joint. Dorsalis pedis and posterior tibial artery pulses are palpable. Common peroneal nerve function is well preserved. Quad mechanism is well preserved.       X-RAY Report:        Diagnostics:  Note from the physical therapy office discussed with the patient at length and the plans are also reviewed and discussed with the patient about further therapy including home based exercise program.      Eduarda was seen today for follow-up.    Diagnoses and all orders for this visit:    Injury of left knee, subsequent encounter    Other orders  -     ibuprofen (ADVIL,MOTRIN) 600 MG tablet; Take 1 tablet by mouth Daily.            Procedures        Plan:  Use a supportive brace.    Continue with aggressive physical therapy with stretching and strengthening exercises of the quads and the hamstrings.    Tablet ibuprofen 600 mg orally twice a day for pain swelling and discomfort.    Reinjury precautions.    Patient will return to work with affect from June 1, 2018.  Her restrictions are to allow her to sit for 10 minutes every 2 hours.  Her maximum weight limit is no more than 30 pounds maximum of lifting.  She does push heavy carts in the kitchen and is recommended not to push any loads heavier than  30 pounds.  These restrictions are in place for 4 weeks.    Follow-up in my office in 4 weeks for reevaluation.    Time spent in the office with the patient today discussing the treatment plan and her work restrictions is 30 minutes.  Greater than 50% of my time was spent face-to-face with the patient about her work requirements and safety at the job site upon return to work.

## 2018-06-20 ENCOUNTER — OFFICE VISIT (OUTPATIENT)
Dept: ORTHOPEDIC SURGERY | Facility: CLINIC | Age: 48
End: 2018-06-20

## 2018-06-20 DIAGNOSIS — S89.92XD INJURY OF LEFT KNEE, SUBSEQUENT ENCOUNTER: Primary | ICD-10-CM

## 2018-06-20 PROCEDURE — 99213 OFFICE O/P EST LOW 20 MIN: CPT | Performed by: ORTHOPAEDIC SURGERY

## 2018-06-20 RX ORDER — PYRAZINAMIDE 500 MG/1
1 TABLET ORAL EVERY 12 HOURS PRN
Qty: 40 TABLET | Refills: 0 | OUTPATIENT
Start: 2018-06-20 | End: 2018-09-26 | Stop reason: HOSPADM

## 2018-06-20 NOTE — PROGRESS NOTES
Chief Complaint   Patient presents with   • Left Knee - Follow-up           HPI the patient is here today for a follow up of her left knee work related injury that occurred on 03/08/18.  The patient states that she did try to go back to work but has not been very successful at her workplace.  She has to stand for long periods of time on a concrete floor.  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  She does not have any instability of the knee.  She states that by the end of the day she is limping quite significantly because of her knee pain and discomfort.  She also has developed some swelling in the suprapatellar region.  This is making her knee tight and she has a very difficult time squatting on the ground.  The patient states that she needs some time off work because she cannot safely perform her professional duties at this point.        There were no vitals filed for this visit.        Review of Systems   Constitutional: Negative.    HENT: Negative.    Eyes: Negative.    Respiratory: Negative.    Cardiovascular: Negative.    Gastrointestinal: Negative.    Endocrine: Negative.    Genitourinary: Negative.    Musculoskeletal: Positive for gait problem and joint swelling.   Skin: Negative.    Allergic/Immunologic: Negative.    Hematological: Negative.    Psychiatric/Behavioral: Negative.            Physical Exam   Constitutional: She is oriented to person, place, and time. She appears well-nourished.   HENT:   Head: Atraumatic.   Eyes: EOM are normal.   Neck: Neck supple.   Cardiovascular: Regular rhythm and normal heart sounds.    Pulmonary/Chest: Effort normal and breath sounds normal.   Abdominal: Bowel sounds are normal.   Musculoskeletal: She exhibits edema and tenderness.   Neurological: She is alert and oriented to person, place, and time.   Skin: Skin is dry. Capillary refill takes 2 to 3 seconds.    Psychiatric: She has a normal mood and affect. Her behavior is normal. Judgment and thought content normal.   Nursing note and vitals reviewed.          Joint/Body Part Specific Exam:  left knee. The knee is mildly swollen. The patella is ballotable. There is thickening of the synovial membrane. There appears to be a fluid flow in the supra-patellar space. The patient's knee feels tight in flexion. Range of motion is restricted because of the limited flexion.  Her current range of motion is from ° of flexion. There is some quadriceps inhibition on account of the distension of the joint. There is diffuse tenderness around the knee. The popliteal fossa is full and tender as well. No evidence of a compartmental syndrome is noted. Anterior and posterior drawer signs are negative. No medial or lateral instability is noted. Pivot shift sign is negative. Dorsalis pedis and posterior tibial artery pulses are palpable. Common peroneal nerve function is well preserved.    X-RAY Report:        Diagnostics:        Eduarda was seen today for follow-up.    Diagnoses and all orders for this visit:    Injury of left knee, subsequent encounter  -     Ambulatory Referral to Physical Therapy Evaluate and treat    Other orders  -     acetaminophen-codeine (TYLENOL/CODEINE #3) 300-30 MG per tablet; Take 1 tablet by mouth Every 12 (Twelve) Hours As Needed for Moderate Pain .            Procedures        Plan:  Use a supportive brace on the knee.    I offered her a steroid injection to the knee but she states that that is just a temporary relief of her symptoms and she is terrified of needles and did not want another injection to the knee.    Given her a note for physical therapy to move with rehabilitation of the knee.  She needs to focus on active range of motion with stretching and strengthening exercises of the quads and the hamstrings.    The patient states that the tramadol causes her stomach to the upset and therefore she  would like something else for the pain which is disabling at this point.    Tablet Tylenol with Codeine No. 3 tab 1 by mouth every 12 for breakthrough pain and discomfort.    Tablet ibuprofen 600 mg orally twice a day for pain and discomfort.    Synvisc Visco supplementation discussed with the patient.    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    Follow-up in my office in 6 weeks for reevaluation.    Controlled substance treatment options discussed in detail. Patient's signed consent to medical options on file. KRISTIN form in chart.  The patient is being provided this narcotic prescription to address the acute medical condition that they are undergoing/experiencing at this time.  It is my medical and surgical assessment that more than 3 days of narcotic medication is necessary to help control the pain and discomfort that this patient is experiencing at this point.  Risks of narcotic medication usage outlined.  Possibility of physical and psychological dependence and abuse, especially long term, emphasized to the patient.  I have explained to the patient that we will try to wean them off the narcotic medication as soon as possible and introduce non-narcotic modalities of pain control.  At this point in the patient's clinical spectrum, however, alternative available treatments are inadequate to control their pain and symptoms.  I have also discussed the possibility of random drug testing as well as pill counts to prevent misuse and misappropriation of the narcotic medications prescribed to the patient.

## 2018-09-21 ENCOUNTER — APPOINTMENT (OUTPATIENT)
Dept: PREADMISSION TESTING | Facility: HOSPITAL | Age: 48
End: 2018-09-21

## 2018-09-21 VITALS
DIASTOLIC BLOOD PRESSURE: 79 MMHG | RESPIRATION RATE: 20 BRPM | SYSTOLIC BLOOD PRESSURE: 123 MMHG | TEMPERATURE: 97.2 F | BODY MASS INDEX: 39.27 KG/M2 | WEIGHT: 208 LBS | HEIGHT: 61 IN | HEART RATE: 101 BPM | OXYGEN SATURATION: 98 %

## 2018-09-21 LAB
ALBUMIN SERPL-MCNC: 4.1 G/DL (ref 3.5–5.2)
ALBUMIN/GLOB SERPL: 1.2 G/DL
ALP SERPL-CCNC: 43 U/L (ref 39–117)
ALT SERPL W P-5'-P-CCNC: 10 U/L (ref 1–33)
ANION GAP SERPL CALCULATED.3IONS-SCNC: 13.1 MMOL/L
AST SERPL-CCNC: 13 U/L (ref 1–32)
BASOPHILS # BLD AUTO: 0.01 10*3/MM3 (ref 0–0.2)
BASOPHILS NFR BLD AUTO: 0.2 % (ref 0–1.5)
BILIRUB SERPL-MCNC: 0.5 MG/DL (ref 0.1–1.2)
BUN BLD-MCNC: 8 MG/DL (ref 6–20)
BUN/CREAT SERPL: 11.4 (ref 7–25)
CALCIUM SPEC-SCNC: 9 MG/DL (ref 8.6–10.5)
CHLORIDE SERPL-SCNC: 103 MMOL/L (ref 98–107)
CO2 SERPL-SCNC: 22.9 MMOL/L (ref 22–29)
CREAT BLD-MCNC: 0.7 MG/DL (ref 0.57–1)
DEPRECATED RDW RBC AUTO: 45.3 FL (ref 37–54)
EOSINOPHIL # BLD AUTO: 0.05 10*3/MM3 (ref 0–0.7)
EOSINOPHIL NFR BLD AUTO: 0.9 % (ref 0.3–6.2)
ERYTHROCYTE [DISTWIDTH] IN BLOOD BY AUTOMATED COUNT: 13.8 % (ref 11.7–13)
GFR SERPL CREATININE-BSD FRML MDRD: 109 ML/MIN/1.73
GLOBULIN UR ELPH-MCNC: 3.3 GM/DL
GLUCOSE BLD-MCNC: 78 MG/DL (ref 65–99)
HCT VFR BLD AUTO: 35.3 % (ref 35.6–45.5)
HGB BLD-MCNC: 11.4 G/DL (ref 11.9–15.5)
IMM GRANULOCYTES # BLD: 0.01 10*3/MM3 (ref 0–0.03)
IMM GRANULOCYTES NFR BLD: 0.2 % (ref 0–0.5)
LYMPHOCYTES # BLD AUTO: 2.31 10*3/MM3 (ref 0.9–4.8)
LYMPHOCYTES NFR BLD AUTO: 39.6 % (ref 19.6–45.3)
MCH RBC QN AUTO: 29.2 PG (ref 26.9–32)
MCHC RBC AUTO-ENTMCNC: 32.3 G/DL (ref 32.4–36.3)
MCV RBC AUTO: 90.5 FL (ref 80.5–98.2)
MONOCYTES # BLD AUTO: 0.29 10*3/MM3 (ref 0.2–1.2)
MONOCYTES NFR BLD AUTO: 5 % (ref 5–12)
NEUTROPHILS # BLD AUTO: 3.18 10*3/MM3 (ref 1.9–8.1)
NEUTROPHILS NFR BLD AUTO: 54.3 % (ref 42.7–76)
PLATELET # BLD AUTO: 230 10*3/MM3 (ref 140–500)
PMV BLD AUTO: 11 FL (ref 6–12)
POTASSIUM BLD-SCNC: 3.2 MMOL/L (ref 3.5–5.2)
PROT SERPL-MCNC: 7.4 G/DL (ref 6–8.5)
RBC # BLD AUTO: 3.9 10*6/MM3 (ref 3.9–5.2)
SODIUM BLD-SCNC: 139 MMOL/L (ref 136–145)
WBC NRBC COR # BLD: 5.84 10*3/MM3 (ref 4.5–10.7)

## 2018-09-21 PROCEDURE — 93010 ELECTROCARDIOGRAM REPORT: CPT | Performed by: INTERNAL MEDICINE

## 2018-09-21 PROCEDURE — 80053 COMPREHEN METABOLIC PANEL: CPT | Performed by: ORTHOPAEDIC SURGERY

## 2018-09-21 PROCEDURE — 36415 COLL VENOUS BLD VENIPUNCTURE: CPT

## 2018-09-21 PROCEDURE — 85025 COMPLETE CBC W/AUTO DIFF WBC: CPT | Performed by: ORTHOPAEDIC SURGERY

## 2018-09-21 PROCEDURE — 93005 ELECTROCARDIOGRAM TRACING: CPT

## 2018-09-21 NOTE — DISCHARGE INSTRUCTIONS
Take the following medications the morning of surgery with a small sip of water:        General Instructions:  • Do not eat solid food after midnight the night before surgery.  • You may drink clear liquids day of surgery but must stop at least one hour before your hospital arrival time.  • It is beneficial for you to have a clear drink that contains carbohydrates the day of surgery.  We suggest a 12 to 20 ounce bottle of Gatorade or Powerade for non-diabetic patients or a 12 to 20 ounce bottle of G2 or Powerade Zero for diabetic patients. (Pediatric patients, are not advised to drink a 12 to 20 ounce carbohydrate drink)    Clear liquids are liquids you can see through.  Nothing red in color.     Plain water                               Sports drinks  Sodas                                   Gelatin (Jell-O)  Fruit juices without pulp such as white grape juice and apple juice  Popsicles that contain no fruit or yogurt  Tea or coffee (no cream or milk added)  Gatorade / Powerade  G2 / Powerade Zero    • Infants may have breast milk up to four hours before surgery.  • Infants drinking formula may drink formula up to six hours before surgery.   • Patients who avoid smoking, chewing tobacco and alcohol for 4 weeks prior to surgery have a reduced risk of post-operative complications.  Quit smoking as many days before surgery as you can.  • Do not smoke, use chewing tobacco or drink alcohol the day of surgery.   • If applicable bring your C-PAP/ BI-PAP machine.  • Bring any papers given to you in the doctor’s office.  • Wear clean comfortable clothes and socks.  • Do not wear contact lenses or make-up.  Bring a case for your glasses.   • Bring crutches or walker if applicable.  • Remove all piercings.  Leave jewelry and any other valuables at home.  • Hair extensions with metal clips must be removed prior to surgery.  • The Pre-Admission Testing nurse will instruct you to bring medications if unable to obtain an accurate  list in Pre-Admission Testing.        If you were given a blood bank ID arm band remember to bring it with you the day of surgery.    Preventing a Surgical Site Infection:  • For 2 to 3 days before surgery, avoid shaving with a razor because the razor can irritate skin and make it easier to develop an infection.    • Any areas of open skin can increase the risk of a post-operative wound infection by allowing bacteria to enter and travel throughout the body.  Notify your surgeon if you have any skin wounds / rashes even if it is not near the expected surgical site.  The area will need assessed to determine if surgery should be delayed until it is healed.  • The night prior to surgery sleep in a clean bed with clean clothing.  Do not allow pets to sleep with you.  • Shower on the morning of surgery using a fresh bar of anti-bacterial soap (such as Dial) and clean washcloth.  Dry with a clean towel and dress in clean clothing.  • Ask your surgeon if you will be receiving antibiotics prior to surgery.  • Make sure you, your family, and all healthcare providers clean their hands with soap and water or an alcohol based hand  before caring for you or your wound.    Day of surgery:  Upon arrival, a Pre-op nurse and Anesthesiologist will review your health history, obtain vital signs, and answer questions you may have.  The only belongings needed at this time will be your home medications and if applicable your C-PAP/BI-PAP machine.  If you are staying overnight your family can leave the rest of your belongings in the car and bring them to your room later.  A Pre-op nurse will start an IV and you may receive medication in preparation for surgery, including something to help you relax.  Your family will be able to see you in the Pre-op area.  While you are in surgery your family should notify the waiting room  if they leave the waiting room area and provide a contact phone number.    Please be aware that  surgery does come with discomfort.  We want to make every effort to control your discomfort so please discuss any uncontrolled symptoms with your nurse.   Your doctor will most likely have prescribed pain medications.      If you are going home after surgery you will receive individualized written care instructions before being discharged.  A responsible adult must drive you to and from the hospital on the day of your surgery and stay with you for 24 hours.    If you are staying overnight following surgery, you will be transported to your hospital room following the recovery period.  [facility] has all private rooms.    You have received a list of surgical assistants for your reference.  If you have any questions please call Pre-Admission Testing at [phone].  Deductibles and co-payments are collected on the day of service. Please be prepared to pay the required co-pay, deductible or deposit on the day of service as defined by your plan.

## 2018-09-26 ENCOUNTER — ANESTHESIA EVENT (OUTPATIENT)
Dept: PERIOP | Facility: HOSPITAL | Age: 48
End: 2018-09-26

## 2018-09-26 ENCOUNTER — ANESTHESIA (OUTPATIENT)
Dept: PERIOP | Facility: HOSPITAL | Age: 48
End: 2018-09-26

## 2018-09-26 ENCOUNTER — HOSPITAL ENCOUNTER (OUTPATIENT)
Facility: HOSPITAL | Age: 48
Setting detail: HOSPITAL OUTPATIENT SURGERY
Discharge: HOME | End: 2018-09-26
Attending: ORTHOPAEDIC SURGERY | Admitting: ORTHOPAEDIC SURGERY

## 2018-09-26 VITALS
TEMPERATURE: 98 F | RESPIRATION RATE: 18 BRPM | OXYGEN SATURATION: 95 % | HEART RATE: 81 BPM | DIASTOLIC BLOOD PRESSURE: 91 MMHG | SYSTOLIC BLOOD PRESSURE: 140 MMHG

## 2018-09-26 PROCEDURE — 25010000002 MIDAZOLAM PER 1 MG: Performed by: ANESTHESIOLOGY

## 2018-09-26 PROCEDURE — 25010000002 FENTANYL CITRATE (PF) 100 MCG/2ML SOLUTION: Performed by: ANESTHESIOLOGY

## 2018-09-26 PROCEDURE — 25010000003 CEFAZOLIN IN DEXTROSE 2-4 GM/100ML-% SOLUTION: Performed by: ORTHOPAEDIC SURGERY

## 2018-09-26 PROCEDURE — 25010000002 HYDROMORPHONE PER 4 MG: Performed by: NURSE ANESTHETIST, CERTIFIED REGISTERED

## 2018-09-26 PROCEDURE — 25010000002 DEXAMETHASONE PER 1 MG: Performed by: NURSE ANESTHETIST, CERTIFIED REGISTERED

## 2018-09-26 PROCEDURE — 25010000002 ONDANSETRON PER 1 MG: Performed by: NURSE ANESTHETIST, CERTIFIED REGISTERED

## 2018-09-26 PROCEDURE — 25010000002 FENTANYL CITRATE (PF) 100 MCG/2ML SOLUTION: Performed by: NURSE ANESTHETIST, CERTIFIED REGISTERED

## 2018-09-26 PROCEDURE — 25010000002 PROPOFOL 10 MG/ML EMULSION: Performed by: NURSE ANESTHETIST, CERTIFIED REGISTERED

## 2018-09-26 RX ORDER — SODIUM CHLORIDE 0.9 % (FLUSH) 0.9 %
1-10 SYRINGE (ML) INJECTION AS NEEDED
Status: DISCONTINUED | OUTPATIENT
Start: 2018-09-26 | End: 2018-09-26 | Stop reason: HOSPADM

## 2018-09-26 RX ORDER — ONDANSETRON 2 MG/ML
INJECTION INTRAMUSCULAR; INTRAVENOUS AS NEEDED
Status: DISCONTINUED | OUTPATIENT
Start: 2018-09-26 | End: 2018-09-26 | Stop reason: SURG

## 2018-09-26 RX ORDER — FENTANYL CITRATE 50 UG/ML
50 INJECTION, SOLUTION INTRAMUSCULAR; INTRAVENOUS
Status: DISCONTINUED | OUTPATIENT
Start: 2018-09-26 | End: 2018-09-26 | Stop reason: HOSPADM

## 2018-09-26 RX ORDER — FAMOTIDINE 10 MG/ML
20 INJECTION, SOLUTION INTRAVENOUS ONCE
Status: COMPLETED | OUTPATIENT
Start: 2018-09-26 | End: 2018-09-26

## 2018-09-26 RX ORDER — EPHEDRINE SULFATE 50 MG/ML
5 INJECTION, SOLUTION INTRAVENOUS ONCE AS NEEDED
Status: DISCONTINUED | OUTPATIENT
Start: 2018-09-26 | End: 2018-09-26 | Stop reason: HOSPADM

## 2018-09-26 RX ORDER — ALBUTEROL SULFATE 2.5 MG/3ML
2.5 SOLUTION RESPIRATORY (INHALATION) ONCE AS NEEDED
Status: DISCONTINUED | OUTPATIENT
Start: 2018-09-26 | End: 2018-09-26 | Stop reason: HOSPADM

## 2018-09-26 RX ORDER — NALOXONE HCL 0.4 MG/ML
0.2 VIAL (ML) INJECTION AS NEEDED
Status: DISCONTINUED | OUTPATIENT
Start: 2018-09-26 | End: 2018-09-26 | Stop reason: HOSPADM

## 2018-09-26 RX ORDER — SODIUM CHLORIDE, SODIUM LACTATE, POTASSIUM CHLORIDE, AND CALCIUM CHLORIDE .6; .31; .03; .02 G/100ML; G/100ML; G/100ML; G/100ML
IRRIGANT IRRIGATION AS NEEDED
Status: DISCONTINUED | OUTPATIENT
Start: 2018-09-26 | End: 2018-09-26 | Stop reason: HOSPADM

## 2018-09-26 RX ORDER — PROPOFOL 10 MG/ML
VIAL (ML) INTRAVENOUS AS NEEDED
Status: DISCONTINUED | OUTPATIENT
Start: 2018-09-26 | End: 2018-09-26 | Stop reason: SURG

## 2018-09-26 RX ORDER — CEFAZOLIN SODIUM 2 G/100ML
2 INJECTION, SOLUTION INTRAVENOUS ONCE
Status: COMPLETED | OUTPATIENT
Start: 2018-09-26 | End: 2018-09-26

## 2018-09-26 RX ORDER — SODIUM CHLORIDE, SODIUM LACTATE, POTASSIUM CHLORIDE, CALCIUM CHLORIDE 600; 310; 30; 20 MG/100ML; MG/100ML; MG/100ML; MG/100ML
100 INJECTION, SOLUTION INTRAVENOUS CONTINUOUS
Status: DISCONTINUED | OUTPATIENT
Start: 2018-09-26 | End: 2018-09-26 | Stop reason: HOSPADM

## 2018-09-26 RX ORDER — PROMETHAZINE HYDROCHLORIDE 25 MG/ML
12.5 INJECTION, SOLUTION INTRAMUSCULAR; INTRAVENOUS ONCE AS NEEDED
Status: DISCONTINUED | OUTPATIENT
Start: 2018-09-26 | End: 2018-09-26 | Stop reason: HOSPADM

## 2018-09-26 RX ORDER — LIDOCAINE HYDROCHLORIDE 10 MG/ML
0.5 INJECTION, SOLUTION EPIDURAL; INFILTRATION; INTRACAUDAL; PERINEURAL ONCE AS NEEDED
Status: DISCONTINUED | OUTPATIENT
Start: 2018-09-26 | End: 2018-09-26 | Stop reason: HOSPADM

## 2018-09-26 RX ORDER — LIDOCAINE HYDROCHLORIDE 20 MG/ML
INJECTION, SOLUTION INFILTRATION; PERINEURAL AS NEEDED
Status: DISCONTINUED | OUTPATIENT
Start: 2018-09-26 | End: 2018-09-26 | Stop reason: SURG

## 2018-09-26 RX ORDER — PROMETHAZINE HYDROCHLORIDE 25 MG/1
12.5 TABLET ORAL ONCE AS NEEDED
Status: DISCONTINUED | OUTPATIENT
Start: 2018-09-26 | End: 2018-09-26 | Stop reason: HOSPADM

## 2018-09-26 RX ORDER — HYDROCODONE BITARTRATE AND ACETAMINOPHEN 5; 325 MG/1; MG/1
1 TABLET ORAL ONCE AS NEEDED
Status: DISCONTINUED | OUTPATIENT
Start: 2018-09-26 | End: 2018-09-26 | Stop reason: HOSPADM

## 2018-09-26 RX ORDER — BUPIVACAINE HYDROCHLORIDE AND EPINEPHRINE 5; 5 MG/ML; UG/ML
INJECTION, SOLUTION PERINEURAL AS NEEDED
Status: DISCONTINUED | OUTPATIENT
Start: 2018-09-26 | End: 2018-09-26 | Stop reason: HOSPADM

## 2018-09-26 RX ORDER — ONDANSETRON 2 MG/ML
4 INJECTION INTRAMUSCULAR; INTRAVENOUS ONCE AS NEEDED
Status: DISCONTINUED | OUTPATIENT
Start: 2018-09-26 | End: 2018-09-26 | Stop reason: HOSPADM

## 2018-09-26 RX ORDER — HYDROCODONE BITARTRATE AND ACETAMINOPHEN 5; 325 MG/1; MG/1
1-2 TABLET ORAL EVERY 4 HOURS PRN
Qty: 40 TABLET | Refills: 0 | Status: SHIPPED | OUTPATIENT
Start: 2018-09-26 | End: 2019-03-18

## 2018-09-26 RX ORDER — LABETALOL HYDROCHLORIDE 5 MG/ML
5 INJECTION, SOLUTION INTRAVENOUS
Status: DISCONTINUED | OUTPATIENT
Start: 2018-09-26 | End: 2018-09-26 | Stop reason: HOSPADM

## 2018-09-26 RX ORDER — FENTANYL CITRATE 50 UG/ML
INJECTION, SOLUTION INTRAMUSCULAR; INTRAVENOUS AS NEEDED
Status: DISCONTINUED | OUTPATIENT
Start: 2018-09-26 | End: 2018-09-26 | Stop reason: SURG

## 2018-09-26 RX ORDER — PROMETHAZINE HYDROCHLORIDE 25 MG/1
25 SUPPOSITORY RECTAL ONCE AS NEEDED
Status: DISCONTINUED | OUTPATIENT
Start: 2018-09-26 | End: 2018-09-26 | Stop reason: HOSPADM

## 2018-09-26 RX ORDER — HYDROCODONE BITARTRATE AND ACETAMINOPHEN 7.5; 325 MG/1; MG/1
1 TABLET ORAL ONCE AS NEEDED
Status: COMPLETED | OUTPATIENT
Start: 2018-09-26 | End: 2018-09-26

## 2018-09-26 RX ORDER — MIDAZOLAM HYDROCHLORIDE 1 MG/ML
2 INJECTION INTRAMUSCULAR; INTRAVENOUS
Status: DISCONTINUED | OUTPATIENT
Start: 2018-09-26 | End: 2018-09-26 | Stop reason: HOSPADM

## 2018-09-26 RX ORDER — DEXAMETHASONE SODIUM PHOSPHATE 10 MG/ML
INJECTION INTRAMUSCULAR; INTRAVENOUS AS NEEDED
Status: DISCONTINUED | OUTPATIENT
Start: 2018-09-26 | End: 2018-09-26 | Stop reason: SURG

## 2018-09-26 RX ORDER — MIDAZOLAM HYDROCHLORIDE 1 MG/ML
1 INJECTION INTRAMUSCULAR; INTRAVENOUS
Status: DISCONTINUED | OUTPATIENT
Start: 2018-09-26 | End: 2018-09-26 | Stop reason: HOSPADM

## 2018-09-26 RX ORDER — FLUMAZENIL 0.1 MG/ML
0.2 INJECTION INTRAVENOUS AS NEEDED
Status: DISCONTINUED | OUTPATIENT
Start: 2018-09-26 | End: 2018-09-26 | Stop reason: HOSPADM

## 2018-09-26 RX ORDER — OXYCODONE AND ACETAMINOPHEN 7.5; 325 MG/1; MG/1
1 TABLET ORAL ONCE AS NEEDED
Status: DISCONTINUED | OUTPATIENT
Start: 2018-09-26 | End: 2018-09-26 | Stop reason: HOSPADM

## 2018-09-26 RX ORDER — PROMETHAZINE HYDROCHLORIDE 25 MG/1
25 TABLET ORAL ONCE AS NEEDED
Status: DISCONTINUED | OUTPATIENT
Start: 2018-09-26 | End: 2018-09-26 | Stop reason: HOSPADM

## 2018-09-26 RX ORDER — SODIUM CHLORIDE, SODIUM LACTATE, POTASSIUM CHLORIDE, CALCIUM CHLORIDE 600; 310; 30; 20 MG/100ML; MG/100ML; MG/100ML; MG/100ML
9 INJECTION, SOLUTION INTRAVENOUS CONTINUOUS
Status: DISCONTINUED | OUTPATIENT
Start: 2018-09-26 | End: 2018-09-26 | Stop reason: HOSPADM

## 2018-09-26 RX ORDER — DIPHENHYDRAMINE HYDROCHLORIDE 50 MG/ML
12.5 INJECTION INTRAMUSCULAR; INTRAVENOUS
Status: DISCONTINUED | OUTPATIENT
Start: 2018-09-26 | End: 2018-09-26 | Stop reason: HOSPADM

## 2018-09-26 RX ADMIN — HYDROCODONE BITARTRATE AND ACETAMINOPHEN 1 TABLET: 7.5; 325 TABLET ORAL at 15:32

## 2018-09-26 RX ADMIN — PROPOFOL 200 MG: 10 INJECTION, EMULSION INTRAVENOUS at 14:19

## 2018-09-26 RX ADMIN — MIDAZOLAM HYDROCHLORIDE 2 MG: 2 INJECTION, SOLUTION INTRAMUSCULAR; INTRAVENOUS at 12:56

## 2018-09-26 RX ADMIN — SODIUM CHLORIDE, POTASSIUM CHLORIDE, SODIUM LACTATE AND CALCIUM CHLORIDE 9 ML/HR: 600; 310; 30; 20 INJECTION, SOLUTION INTRAVENOUS at 12:56

## 2018-09-26 RX ADMIN — FENTANYL CITRATE 50 MCG: 50 INJECTION INTRAMUSCULAR; INTRAVENOUS at 14:19

## 2018-09-26 RX ADMIN — FENTANYL CITRATE 50 MCG: 50 INJECTION INTRAMUSCULAR; INTRAVENOUS at 14:31

## 2018-09-26 RX ADMIN — CEFAZOLIN SODIUM 2 G: 2 INJECTION, SOLUTION INTRAVENOUS at 14:12

## 2018-09-26 RX ADMIN — FENTANYL CITRATE 50 MCG: 50 INJECTION INTRAMUSCULAR; INTRAVENOUS at 12:57

## 2018-09-26 RX ADMIN — FAMOTIDINE 20 MG: 10 INJECTION, SOLUTION INTRAVENOUS at 12:56

## 2018-09-26 RX ADMIN — LIDOCAINE HYDROCHLORIDE 60 MG: 20 INJECTION, SOLUTION INFILTRATION; PERINEURAL at 14:19

## 2018-09-26 RX ADMIN — ONDANSETRON 4 MG: 2 INJECTION INTRAMUSCULAR; INTRAVENOUS at 14:31

## 2018-09-26 RX ADMIN — DEXAMETHASONE SODIUM PHOSPHATE 8 MG: 10 INJECTION INTRAMUSCULAR; INTRAVENOUS at 14:31

## 2018-09-26 RX ADMIN — HYDROMORPHONE HYDROCHLORIDE 0.5 MG: 1 INJECTION, SOLUTION INTRAMUSCULAR; INTRAVENOUS; SUBCUTANEOUS at 16:09

## 2018-09-26 NOTE — ANESTHESIA PREPROCEDURE EVALUATION
Anesthesia Evaluation     Patient summary reviewed and Nursing notes reviewed   no history of anesthetic complications:  NPO Solid Status: > 8 hours  NPO Liquid Status: > 2 hours           Airway   Mallampati: II  TM distance: >3 FB  Neck ROM: full  Dental - normal exam     Pulmonary - normal exam   (+) a smoker Former,   Cardiovascular - negative cardio ROS and normal exam        Neuro/Psych- negative ROS  GI/Hepatic/Renal/Endo    (+) morbid obesity,      Musculoskeletal (-) negative ROS    Abdominal    Substance History - negative use     OB/GYN negative ob/gyn ROS         Other                        Anesthesia Plan    ASA 2     general     Anesthetic plan, all risks, benefits, and alternatives have been provided, discussed and informed consent has been obtained with: patient.

## 2018-09-26 NOTE — ANESTHESIA POSTPROCEDURE EVALUATION
Patient: Christy Verner    Procedure Summary     Date:  09/26/18 Room / Location:   [facility]  [facility]    Anesthesia Start:  1412 Anesthesia Stop:  1501    Procedure:  LEFT KNEE ARTHROSCOPY, EXTENSIVE CHONDROPLASTY, PARTIAL LATERAL MENISECTOMY (Left Knee) Diagnosis:      Surgeon:  Moe Martínez MD Provider:  Chris Smart MD    Anesthesia Type:  general ASA Status:  2          Anesthesia Type: general  Last vitals  BP   140/91 (09/26/18 1612)   Temp   36.7 °C (98 °F) (09/26/18 1545)   Pulse   81 (09/26/18 1612)   Resp   18 (09/26/18 1612)     SpO2   95 % (09/26/18 1612)     Post Anesthesia Care and Evaluation    Patient location during evaluation: bedside  Patient participation: complete - patient participated  Level of consciousness: awake  Pain score: 2  Pain management: adequate  Airway patency: patent  Anesthetic complications: No anesthetic complications  PONV Status: none  Cardiovascular status: acceptable  Respiratory status: acceptable  Hydration status: acceptable    Comments: /91 (Patient Position: Sitting)   Pulse 81   Temp 36.7 °C (98 °F)   Resp 18   SpO2 95%

## 2018-09-26 NOTE — ANESTHESIA PROCEDURE NOTES
Airway  Urgency: elective    Airway not difficult    General Information and Staff    Patient location during procedure: OR  Anesthesiologist: ARMANI HOWARD  CRNA: LETITIA MARTINEZ    Indications and Patient Condition  Indications for airway management: airway protection    Preoxygenated: yes  Mask difficulty assessment: 1 - vent by mask    Final Airway Details  Final airway type: supraglottic airway      Successful airway: classic  Size 4    Number of attempts at approach: 1    Additional Comments  PreO2, IV induction, easy mask, LMA placed w/o difficulty, cuff as packaged- no additional air placed, secured in placed, EBBSH, +etCO2, atraumatic, caps and teeth and lips as preop.

## 2019-03-18 ENCOUNTER — OFFICE VISIT (OUTPATIENT)
Dept: FAMILY MEDICINE CLINIC | Facility: CLINIC | Age: 49
End: 2019-03-18

## 2019-03-18 VITALS
TEMPERATURE: 97.1 F | SYSTOLIC BLOOD PRESSURE: 106 MMHG | HEIGHT: 61 IN | OXYGEN SATURATION: 100 % | WEIGHT: 204 LBS | RESPIRATION RATE: 16 BRPM | HEART RATE: 118 BPM | DIASTOLIC BLOOD PRESSURE: 70 MMHG | BODY MASS INDEX: 38.51 KG/M2

## 2019-03-18 DIAGNOSIS — R52 PAIN: ICD-10-CM

## 2019-03-18 DIAGNOSIS — F32.A DEPRESSION, UNSPECIFIED DEPRESSION TYPE: Primary | ICD-10-CM

## 2019-03-18 DIAGNOSIS — M19.91 PRIMARY OSTEOARTHRITIS, UNSPECIFIED SITE: ICD-10-CM

## 2019-03-18 DIAGNOSIS — Z00.00 HEALTH CARE MAINTENANCE: ICD-10-CM

## 2019-03-18 PROCEDURE — 99214 OFFICE O/P EST MOD 30 MIN: CPT | Performed by: NURSE PRACTITIONER

## 2019-03-18 RX ORDER — TRAZODONE HYDROCHLORIDE 50 MG/1
50 TABLET ORAL NIGHTLY
Qty: 30 TABLET | Refills: 0 | OUTPATIENT
Start: 2019-03-18 | End: 2019-10-11

## 2019-03-18 NOTE — PROGRESS NOTES
Subjective     Christy Verner is a 48 y.o. female who presents with   Chief Complaint   Patient presents with   • Establish Care       Patient is taking multiple Tylenol PM and has been suffering from depression and anxiety since death of her son 9 yrs ago very emotional. In pain all over daily diffuse pain for over 3 months constantly. Complains of shoulder pain and discomfort, knee pain and some times hard to walk and move. Mother has history of arthritis. Has been referred to pain management in the past. Not interested in pain management at this time fears addiction to medication.      Osteoarthritis   Presents for initial visit. The disease course has been worsening. She complains of pain and stiffness. She reports no joint swelling. (Various mood swings irritability and unable to concentrate and focus severe hot flashes daily ) Affected locations include the right shoulder, left shoulder, left foot and right foot. Her pain is at a severity of 8/10. Pertinent negatives include no fatigue or fever. Her past medical history is significant for osteoarthritis. Her pertinent risk factors include overuse. Past treatments include NSAIDs and OTC med. The treatment provided no relief. Factors aggravating her arthritis include activity.   Mental Health Problem   The primary symptoms include bizarre behavior. Primary symptoms comment: various mood swings irritability and unable to concentrate and focus severe hot flashes daily .   The onset of the illness is precipitated by a stressful event and emotional stress. The degree of incapacity that she is experiencing as a consequence of her illness is moderate. Additional symptoms of the illness include insomnia, agitation and distractible. Additional symptoms of the illness do not include appetite change, unexpected weight change, fatigue, headaches or abdominal pain. She does not admit to suicidal ideas. She does not have a plan to commit suicide. She does not contemplate  "harming herself. She has not already injured self. She does not contemplate injuring another person. She has not already  injured another person.        Review of Systems   Constitutional: Negative for appetite change, chills, fatigue, fever and unexpected weight change.   Respiratory: Negative for cough.    Cardiovascular: Negative for chest pain.   Gastrointestinal: Negative for abdominal pain.   Musculoskeletal: Positive for arthralgias, myalgias and stiffness. Negative for joint swelling.   Neurological: Negative for numbness and headaches.   Psychiatric/Behavioral: Positive for agitation and decreased concentration. The patient is nervous/anxious and has insomnia.          The following portions of the patient's history were reviewed and updated as appropriate: allergies, current medications, past family history, past medical history, past social history, past surgical history and problem list.      Patient Active Problem List    Diagnosis Date Noted   • Injury of left knee 04/25/2018   • H/O thyroidectomy 06/02/2014     Note Last Updated: 3/18/2019     Overview:   Pt told to f/u with PCP for thyroid management         Current Outpatient Medications on File Prior to Visit   Medication Sig Dispense Refill   • ibuprofen (ADVIL,MOTRIN) 600 MG tablet Take 1 tablet by mouth Daily. (Patient taking differently: Take 600 mg by mouth Daily. LAST DOSE 9/16/18) 90 tablet 0   • naproxen sodium (ALEVE) 220 MG tablet Take 220 mg by mouth 2 (Two) Times a Day As Needed. LAST DOSE 9/16/18     • [DISCONTINUED] HYDROcodone-acetaminophen (NORCO) 5-325 MG per tablet Take 1-2 tablets by mouth Every 4 (Four) Hours As Needed (pain). 40 tablet 0     No current facility-administered medications on file prior to visit.        Objective     /70 (BP Location: Left arm, Patient Position: Sitting, Cuff Size: Adult)   Pulse 118   Temp 97.1 °F (36.2 °C) (Oral)   Resp 16   Ht 154.9 cm (60.98\")   Wt 92.5 kg (204 lb)   SpO2 100%   " BMI 38.57 kg/m²     Physical Exam   Constitutional: She is oriented to person, place, and time. She appears well-developed and well-nourished.   Eyes: Conjunctivae are normal. Pupils are equal, round, and reactive to light.   Neck: Normal range of motion.   Cardiovascular: Normal rate and regular rhythm.   Pulmonary/Chest: Effort normal and breath sounds normal.   Abdominal: Soft. Bowel sounds are normal.   Musculoskeletal: She exhibits no edema.        Right shoulder: She exhibits decreased range of motion and tenderness.        Right knee: Tenderness found.        Left knee: She exhibits abnormal meniscus (RECENTLY REPAIRED ).   Neurological: She is alert and oriented to person, place, and time.   Skin: Skin is warm.   Psychiatric: Her mood appears anxious. She exhibits a depressed mood.   Vitals reviewed.      Procedures    Assessment/Plan   Eduarda was seen today for establish care.    Diagnoses and all orders for this visit:    Depression, unspecified depression type  -     FSH & LH  -     CBC & Differential  -     Comprehensive metabolic panel  -     TSH  -     traZODone (DESYREL) 50 MG tablet; Take 1 tablet by mouth Every Night.    Primary osteoarthritis, unspecified site  -     Sedimentation Rate  -     diclofenac (VOLTAREN) 1 % gel gel; Apply 4 g topically to the appropriate area as directed 4 (Four) Times a Day As Needed (pain).  -     C-reactive protein  -     CK  -     diclofenac (VOLTAREN) 50 MG EC tablet; Take 1 tablet by mouth 2 (Two) Times a Day As Needed (PAIN).    Pain    Health care maintenance  -     Mammo Screening Bilateral With CAD; Future  -     Hepatic Function Panel        Discussion  Will use Voltaren gel at work and tabs for home. Patient verbalized understanding.       No future appointments.

## 2019-03-19 LAB
ALBUMIN SERPL-MCNC: 4.8 G/DL (ref 3.5–5.5)
ALBUMIN SERPL-MCNC: 4.8 G/DL (ref 3.5–5.5)
ALBUMIN/GLOB SERPL: 1.6 {RATIO} (ref 1.2–2.2)
ALP SERPL-CCNC: 62 IU/L (ref 39–117)
ALP SERPL-CCNC: 63 IU/L (ref 39–117)
ALT SERPL-CCNC: 12 IU/L (ref 0–32)
ALT SERPL-CCNC: 12 IU/L (ref 0–32)
AST SERPL-CCNC: 14 IU/L (ref 0–40)
AST SERPL-CCNC: 15 IU/L (ref 0–40)
BASOPHILS # BLD AUTO: 0 X10E3/UL (ref 0–0.2)
BASOPHILS NFR BLD AUTO: 0 %
BILIRUB DIRECT SERPL-MCNC: 0.13 MG/DL (ref 0–0.4)
BILIRUB SERPL-MCNC: 0.4 MG/DL (ref 0–1.2)
BILIRUB SERPL-MCNC: 0.4 MG/DL (ref 0–1.2)
BUN SERPL-MCNC: 15 MG/DL (ref 6–24)
BUN/CREAT SERPL: 20 (ref 9–23)
CALCIUM SERPL-MCNC: 9.7 MG/DL (ref 8.7–10.2)
CHLORIDE SERPL-SCNC: 100 MMOL/L (ref 96–106)
CK SERPL-CCNC: 178 U/L (ref 24–173)
CO2 SERPL-SCNC: 24 MMOL/L (ref 20–29)
CREAT SERPL-MCNC: 0.74 MG/DL (ref 0.57–1)
CRP SERPL-MCNC: 3 MG/L (ref 0–4.9)
EOSINOPHIL # BLD AUTO: 0.1 X10E3/UL (ref 0–0.4)
EOSINOPHIL NFR BLD AUTO: 1 %
ERYTHROCYTE [DISTWIDTH] IN BLOOD BY AUTOMATED COUNT: 14.1 % (ref 12.3–15.4)
ERYTHROCYTE [SEDIMENTATION RATE] IN BLOOD BY WESTERGREN METHOD: 10 MM/HR (ref 0–32)
FSH SERPL-ACNC: 51.6 MIU/ML
GLOBULIN SER CALC-MCNC: 3 G/DL (ref 1.5–4.5)
GLUCOSE SERPL-MCNC: 88 MG/DL (ref 65–99)
HCT VFR BLD AUTO: 37.7 % (ref 34–46.6)
HGB BLD-MCNC: 12.1 G/DL (ref 11.1–15.9)
IMM GRANULOCYTES # BLD AUTO: 0 X10E3/UL (ref 0–0.1)
IMM GRANULOCYTES NFR BLD AUTO: 0 %
LH SERPL-ACNC: 29.6 MIU/ML
LYMPHOCYTES # BLD AUTO: 2.8 X10E3/UL (ref 0.7–3.1)
LYMPHOCYTES NFR BLD AUTO: 39 %
MCH RBC QN AUTO: 28.4 PG (ref 26.6–33)
MCHC RBC AUTO-ENTMCNC: 32.1 G/DL (ref 31.5–35.7)
MCV RBC AUTO: 89 FL (ref 79–97)
MONOCYTES # BLD AUTO: 0.6 X10E3/UL (ref 0.1–0.9)
MONOCYTES NFR BLD AUTO: 8 %
NEUTROPHILS # BLD AUTO: 3.8 X10E3/UL (ref 1.4–7)
NEUTROPHILS NFR BLD AUTO: 52 %
PLATELET # BLD AUTO: 258 X10E3/UL (ref 150–379)
POTASSIUM SERPL-SCNC: 3.9 MMOL/L (ref 3.5–5.2)
PROT SERPL-MCNC: 7.7 G/DL (ref 6–8.5)
PROT SERPL-MCNC: 7.8 G/DL (ref 6–8.5)
RBC # BLD AUTO: 4.26 X10E6/UL (ref 3.77–5.28)
SODIUM SERPL-SCNC: 140 MMOL/L (ref 134–144)
TSH SERPL DL<=0.005 MIU/L-ACNC: 2.02 UIU/ML (ref 0.45–4.5)
WBC # BLD AUTO: 7.2 X10E3/UL (ref 3.4–10.8)

## 2019-03-20 ENCOUNTER — TELEPHONE (OUTPATIENT)
Dept: FAMILY MEDICINE CLINIC | Facility: CLINIC | Age: 49
End: 2019-03-20

## 2019-03-21 ENCOUNTER — OFFICE VISIT (OUTPATIENT)
Dept: FAMILY MEDICINE CLINIC | Facility: CLINIC | Age: 49
End: 2019-03-21

## 2019-03-21 ENCOUNTER — TRANSCRIBE ORDERS (OUTPATIENT)
Dept: ADMINISTRATIVE | Facility: HOSPITAL | Age: 49
End: 2019-03-21

## 2019-03-21 VITALS
DIASTOLIC BLOOD PRESSURE: 74 MMHG | WEIGHT: 207 LBS | OXYGEN SATURATION: 100 % | TEMPERATURE: 97.7 F | HEART RATE: 98 BPM | BODY MASS INDEX: 39.08 KG/M2 | HEIGHT: 61 IN | SYSTOLIC BLOOD PRESSURE: 112 MMHG

## 2019-03-21 DIAGNOSIS — Z78.0 MENOPAUSE: Primary | ICD-10-CM

## 2019-03-21 DIAGNOSIS — M25.512 ACUTE PAIN OF LEFT SHOULDER: ICD-10-CM

## 2019-03-21 DIAGNOSIS — M79.642 HAND PAIN, LEFT: ICD-10-CM

## 2019-03-21 DIAGNOSIS — Z12.39 SCREENING BREAST EXAMINATION: Primary | ICD-10-CM

## 2019-03-21 PROCEDURE — 99214 OFFICE O/P EST MOD 30 MIN: CPT | Performed by: NURSE PRACTITIONER

## 2019-03-21 RX ORDER — NORGESTIMATE AND ETHINYL ESTRADIOL 7DAYSX3 LO
1 KIT ORAL DAILY
Qty: 28 TABLET | Refills: 6 | Status: SHIPPED | OUTPATIENT
Start: 2019-03-21 | End: 2020-02-17

## 2019-03-25 ENCOUNTER — TELEPHONE (OUTPATIENT)
Dept: FAMILY MEDICINE CLINIC | Facility: CLINIC | Age: 49
End: 2019-03-25

## 2019-03-25 DIAGNOSIS — M25.519 ACUTE SHOULDER PAIN, UNSPECIFIED LATERALITY: Primary | ICD-10-CM

## 2019-04-18 ENCOUNTER — HOSPITAL ENCOUNTER (OUTPATIENT)
Dept: MAMMOGRAPHY | Facility: HOSPITAL | Age: 49
Discharge: HOME | End: 2019-04-18
Admitting: NURSE PRACTITIONER

## 2019-04-18 DIAGNOSIS — Z12.39 SCREENING BREAST EXAMINATION: ICD-10-CM

## 2019-04-18 PROCEDURE — 77067 SCR MAMMO BI INCL CAD: CPT

## 2019-10-11 ENCOUNTER — HOSPITAL ENCOUNTER (EMERGENCY)
Facility: HOSPITAL | Age: 49
Discharge: HOME | End: 2019-10-11
Attending: EMERGENCY MEDICINE | Admitting: EMERGENCY MEDICINE

## 2019-10-11 VITALS
HEIGHT: 64 IN | DIASTOLIC BLOOD PRESSURE: 89 MMHG | HEART RATE: 93 BPM | RESPIRATION RATE: 15 BRPM | SYSTOLIC BLOOD PRESSURE: 127 MMHG | OXYGEN SATURATION: 97 % | TEMPERATURE: 98 F | BODY MASS INDEX: 33.46 KG/M2 | WEIGHT: 196 LBS

## 2019-10-11 DIAGNOSIS — S39.012A LUMBAR STRAIN, INITIAL ENCOUNTER: Primary | ICD-10-CM

## 2019-10-11 PROCEDURE — 25010000002 KETOROLAC TROMETHAMINE PER 15 MG: Performed by: NURSE PRACTITIONER

## 2019-10-11 PROCEDURE — 99284 EMERGENCY DEPT VISIT MOD MDM: CPT

## 2019-10-11 PROCEDURE — 96372 THER/PROPH/DIAG INJ SC/IM: CPT

## 2019-10-11 RX ORDER — CYCLOBENZAPRINE HCL 10 MG
10 TABLET ORAL ONCE
Status: COMPLETED | OUTPATIENT
Start: 2019-10-11 | End: 2019-10-11

## 2019-10-11 RX ORDER — CYCLOBENZAPRINE HCL 10 MG
10 TABLET ORAL 3 TIMES DAILY PRN
Qty: 21 TABLET | Refills: 0 | OUTPATIENT
Start: 2019-10-11 | End: 2019-11-12

## 2019-10-11 RX ORDER — HYDROCODONE BITARTRATE AND ACETAMINOPHEN 7.5; 325 MG/1; MG/1
1 TABLET ORAL ONCE
Status: COMPLETED | OUTPATIENT
Start: 2019-10-11 | End: 2019-10-11

## 2019-10-11 RX ORDER — DULOXETIN HYDROCHLORIDE 60 MG/1
60 CAPSULE, DELAYED RELEASE ORAL DAILY
COMMUNITY
End: 2020-01-09 | Stop reason: ALTCHOICE

## 2019-10-11 RX ORDER — GABAPENTIN 300 MG/1
300 CAPSULE ORAL 4 TIMES DAILY
COMMUNITY
End: 2020-02-17

## 2019-10-11 RX ORDER — PREDNISONE 1 MG/1
1 TABLET ORAL DAILY
COMMUNITY
End: 2019-10-14

## 2019-10-11 RX ORDER — KETOROLAC TROMETHAMINE 30 MG/ML
30 INJECTION, SOLUTION INTRAMUSCULAR; INTRAVENOUS ONCE
Status: COMPLETED | OUTPATIENT
Start: 2019-10-11 | End: 2019-10-11

## 2019-10-11 RX ADMIN — KETOROLAC TROMETHAMINE 30 MG: 30 INJECTION, SOLUTION INTRAMUSCULAR at 10:01

## 2019-10-11 RX ADMIN — CYCLOBENZAPRINE 10 MG: 10 TABLET, FILM COATED ORAL at 10:01

## 2019-10-11 RX ADMIN — HYDROCODONE BITARTRATE AND ACETAMINOPHEN 1 TABLET: 7.5; 325 TABLET ORAL at 10:00

## 2019-10-11 NOTE — ED PROVIDER NOTES
"Patient presents to the ED c/o worsening right sided low back pain. Pt also complains of shoulder pain. Pt reports surgery to L thumb on 8/1/19 which resulted in \"nerve damage\" to thumb.       On exam, General: Awake, alert, no acute distress  HEENT: EOMI  Pulm: Symmetric chest rise, nonlabored breathing  CV: Regular rate and rhythm  GI: Non-distended  MSK: No deformity, right lumbosacral paraspinal tenderness, no midline tenderness, laying on right side  Skin: Warm, dry  Neuro: Alert and oriented x 3, moving all extremities, no focal deficits  Psych: Calm, cooperative    Vital signs and nursing notes reviewed.     Plan: I agree with the plan to discharge pt with medrol dosepak, muscle relaxer, and instructions for light stretching and heating pads.        MD ATTESTATION NOTE    The MIRELLA and I have discussed this patient's history, physical exam, and treatment plan.  I have reviewed the documentation and personally had a face to face interaction with the patient. I affirm the documentation and agree with the treatment and plan.  The attached note describes my personal findings.      Documentation assistance provided by nasrin Bueno for Dr. Redmond.  Information recorded by the scrkalie was done at my direction and has been verified and validated by me.     Leann Bueno  10/11/19 1235       Francis Redmond MD  10/11/19 1419    "

## 2019-10-11 NOTE — ED PROVIDER NOTES
"EMERGENCY DEPARTMENT ENCOUNTER    CHIEF COMPLAINT  Chief Complaint: back pain  History given by: pt  History limited by: nothing  Time Seen: 0916  Room Number:   PMD: Mally Shaw APRN      HPI:  Pt is a 49 y.o. female who presents with c/o constant, acute on chronic bilateral lower back pain worsening 2 weeks ago. Pain is worsened w/ standing, movement, and ambulation. Denies dysuria, difficulty urinating, or urinary/fecal incontinence. Also denies Gabapentin or steroids relieving symptoms. Denies taking any pain medicine. Pt reports having L thumb surgery recently, resulting in \"nerve damage\" that worsened her chronic back pain.    PAST MEDICAL HISTORY  Active Ambulatory Problems     Diagnosis Date Noted   • Injury of left knee 2018   • H/O thyroidectomy 2014   • Menopause 2019     Resolved Ambulatory Problems     Diagnosis Date Noted   • No Resolved Ambulatory Problems     Past Medical History:   Diagnosis Date   • Anesthesia    • Disease of thyroid gland    • Knee pain, left        PAST SURGICAL HISTORY  Past Surgical History:   Procedure Laterality Date   • APPENDECTOMY     • BACK SURGERY     •  SECTION     • FOOT SURGERY Bilateral    • KNEE ARTHROSCOPY Left 2018    Procedure: LEFT KNEE ARTHROSCOPY, EXTENSIVE CHONDROPLASTY, PARTIAL LATERAL MENISECTOMY;  Surgeon: Moe Martínez MD;  Location: [facility] OR [facility];  Service: Orthopedics   • PARTIAL HYSTERECTOMY     • THYROID SURGERY         FAMILY HISTORY  Family History   Problem Relation Age of Onset   • Rheum arthritis Mother        SOCIAL HISTORY  Social History     Socioeconomic History   • Marital status:      Spouse name: Not on file   • Number of children: Not on file   • Years of education: Not on file   • Highest education level: Not on file   Tobacco Use   • Smoking status: Former Smoker     Years: 7.00     Types: Cigarettes     Last attempt to quit: 2018     Years since quittin.7   • Smokeless tobacco: " Never Used   • Tobacco comment: 3-4 CIGARETTES PER DAY   Substance and Sexual Activity   • Alcohol use: No   • Drug use: No   • Sexual activity: Defer         ALLERGIES  Tramadol    REVIEW OF SYSTEMS  Review of Systems   Constitutional: Negative.  Negative for activity change, appetite change ( decreased), chills, fatigue and fever.   HENT: Negative.  Negative for congestion, ear pain, rhinorrhea and sore throat.    Eyes: Negative.    Respiratory: Negative.  Negative for cough and shortness of breath.    Cardiovascular: Negative.  Negative for chest pain, palpitations and leg swelling ( pedal).   Gastrointestinal: Negative.  Negative for abdominal pain, constipation, diarrhea, nausea and vomiting.   Endocrine: Negative.    Genitourinary: Negative.  Negative for decreased urine volume, difficulty urinating, dysuria, menstrual problem, pelvic pain, urgency and vaginal discharge.   Musculoskeletal: Positive for back pain (acute on chronic).   Skin: Negative.  Negative for rash.   Allergic/Immunologic: Negative.    Neurological: Negative.  Negative for dizziness, weakness, light-headedness, numbness and headaches.   Hematological: Negative.    Psychiatric/Behavioral: Negative.  The patient is not nervous/anxious.    All other systems reviewed and are negative.      PHYSICAL EXAM  ED Triage Vitals   Temp Pulse Resp BP SpO2   -- -- -- -- --      Temp src Heart Rate Source Patient Position BP Location FiO2 (%)   -- -- -- -- --       Physical Exam   Constitutional: She is well-developed, well-nourished, and in no distress. No distress.   HENT:   Head: Normocephalic and atraumatic.   Mouth/Throat: Oropharynx is clear and moist and mucous membranes are normal.   Eyes: Pupils are equal, round, and reactive to light.   Neck: Normal range of motion.   Cardiovascular: Normal rate, regular rhythm and normal heart sounds.   Pulmonary/Chest: Effort normal and breath sounds normal. She has no wheezes.   Abdominal: Soft. Bowel sounds  "are normal. There is no tenderness.   Musculoskeletal: Normal range of motion. She exhibits no edema.        Lumbar back: She exhibits pain. She exhibits normal range of motion, no tenderness and no bony tenderness.   Neurological: She is alert. She has normal sensation and normal strength. Gait normal.   Skin: Skin is warm and dry. No rash noted.   Psychiatric: Mood, memory, affect and judgment normal.   Nursing note and vitals reviewed.        MEDICAL RECORD REVIEW    No records of pt's reported recent thumb surgery.    PROGRESS AND CONSULTS    0917- HR 93. O2 sat 99% on RA. /66. Discussed with pt plan to treat pain with Flexeril, Norco, and Toradol. Pt understands and agrees with the plan, all questions answered. Also ordered Flexeril, Norco, and Toradol for sx management.    1145- Reviewed pt's history and workup with Dr. Redmond. After a bedside evaluation; Dr Redmond agrees with the plan of care. He discussed with pt the plan for discharge home, and instructed her to f/u w/ PCP.    1207- Friend at bedside giving pt ride home.     MEDICATIONS GIVEN IN ER  Medications   ketorolac (TORADOL) injection 30 mg (30 mg Intramuscular Given 10/11/19 1001)   HYDROcodone-acetaminophen (NORCO) 7.5-325 MG per tablet 1 tablet (1 tablet Oral Given 10/11/19 1000)   cyclobenzaprine (FLEXERIL) tablet 10 mg (10 mg Oral Given 10/11/19 1001)       /89   Pulse 93   Temp 98 °F (36.7 °C)   Resp 15   Ht 162.6 cm (64\")   Wt 88.9 kg (196 lb)   SpO2 97%   BMI 33.64 kg/m²       Discussed all results and noted any abnormalities with patient.  Discussed absoute need to recheck abnormalities with PCP.    Reviewed implications of results, diagnosis, meds, responsibility to follow up, warning signs and symptoms of possible worsening, potential complications and reasons to return to ER with patient    Discussed plan for discharge, as there is no emergent indication for admission.  Pt is agreeable and understands need for follow up " and repeat testing.  Pt is aware that discharge does not mean that nothing is wrong but it indicates no emergency is present and they must continue care with PCP.  Pt is discharged with instructions to follow up with primary care doctor to have their blood pressure rechecked.       DIAGNOSIS  Final diagnoses:   Lumbar strain, initial encounter       FOLLOW UP   Mally Shaw APRN  [address] [address]  [phone]    Schedule an appointment as soon as possible for a visit         RX     Medication List      New Prescriptions    cyclobenzaprine 10 MG tablet  Commonly known as:  FLEXERIL  Take 1 tablet by mouth 3 (Three) Times a Day As Needed for Muscle Spasms.        Changed    ibuprofen 600 MG tablet  Commonly known as:  ADVIL,MOTRIN  Take 1 tablet by mouth Daily.  What changed:  additional instructions          I personally reviewed the past medical history, past surgical history, social history, family history, current medications and allergies as they appear in this chart.  The scribe's note accurately reflects the work and decisions made by me.           Documentation assistance provided by nasrin Churchill for MORRO Ramos.  Information recorded by the scribe was done at my direction and has been verified and validated by me.     Neeta Churchill  10/11/19 1204       Sherron Peres APRN  10/11/19 [id number]

## 2019-10-11 NOTE — ED NOTES
"Pt to ED for acute on chronic lower back pain. Pt states \"I had a fusion back in august and its been getting worse ever since.\"      Billy Presley RN  10/11/19 0912    "

## 2019-10-14 ENCOUNTER — OFFICE VISIT (OUTPATIENT)
Dept: FAMILY MEDICINE CLINIC | Facility: CLINIC | Age: 49
End: 2019-10-14

## 2019-10-14 VITALS
DIASTOLIC BLOOD PRESSURE: 72 MMHG | OXYGEN SATURATION: 99 % | HEIGHT: 64 IN | BODY MASS INDEX: 33.64 KG/M2 | HEART RATE: 109 BPM | SYSTOLIC BLOOD PRESSURE: 130 MMHG | TEMPERATURE: 97.9 F

## 2019-10-14 DIAGNOSIS — M54.50 LOW BACK PAIN, UNSPECIFIED BACK PAIN LATERALITY, UNSPECIFIED CHRONICITY, UNSPECIFIED WHETHER SCIATICA PRESENT: Primary | ICD-10-CM

## 2019-10-14 DIAGNOSIS — M25.519 ACUTE SHOULDER PAIN, UNSPECIFIED LATERALITY: ICD-10-CM

## 2019-10-14 PROCEDURE — 96372 THER/PROPH/DIAG INJ SC/IM: CPT | Performed by: NURSE PRACTITIONER

## 2019-10-14 PROCEDURE — 99214 OFFICE O/P EST MOD 30 MIN: CPT | Performed by: NURSE PRACTITIONER

## 2019-10-14 RX ORDER — HYDROCODONE BITARTRATE AND ACETAMINOPHEN 5; 325 MG/1; MG/1
1 TABLET ORAL EVERY 8 HOURS PRN
Qty: 12 TABLET | Refills: 0 | Status: CANCELLED | OUTPATIENT
Start: 2019-10-14

## 2019-10-14 RX ORDER — METHYLPREDNISOLONE ACETATE 80 MG/ML
60 INJECTION, SUSPENSION INTRA-ARTICULAR; INTRALESIONAL; INTRAMUSCULAR; SOFT TISSUE ONCE
Status: CANCELLED | OUTPATIENT
Start: 2019-10-14 | End: 2019-10-14

## 2019-10-14 RX ORDER — METHYLPREDNISOLONE ACETATE 80 MG/ML
80 INJECTION, SUSPENSION INTRA-ARTICULAR; INTRALESIONAL; INTRAMUSCULAR; SOFT TISSUE ONCE
Status: COMPLETED | OUTPATIENT
Start: 2019-10-14 | End: 2019-10-14

## 2019-10-14 RX ORDER — PREDNISONE 10 MG/1
TABLET ORAL DAILY
Refills: 0 | COMMUNITY
Start: 2019-09-19 | End: 2019-11-12

## 2019-10-14 RX ORDER — KETOROLAC TROMETHAMINE 30 MG/ML
60 INJECTION, SOLUTION INTRAMUSCULAR; INTRAVENOUS ONCE
Status: COMPLETED | OUTPATIENT
Start: 2019-10-14 | End: 2019-10-14

## 2019-10-14 RX ORDER — HYDROCODONE BITARTRATE AND ACETAMINOPHEN 5; 325 MG/1; MG/1
1 TABLET ORAL EVERY 8 HOURS PRN
Qty: 15 TABLET | Refills: 0 | Status: SHIPPED | OUTPATIENT
Start: 2019-10-14 | End: 2019-10-30 | Stop reason: SDUPTHER

## 2019-10-14 RX ADMIN — METHYLPREDNISOLONE ACETATE 80 MG: 80 INJECTION, SUSPENSION INTRA-ARTICULAR; INTRALESIONAL; INTRAMUSCULAR; SOFT TISSUE at 16:50

## 2019-10-14 RX ADMIN — KETOROLAC TROMETHAMINE 60 MG: 30 INJECTION, SOLUTION INTRAMUSCULAR; INTRAVENOUS at 16:49

## 2019-10-14 NOTE — PROGRESS NOTES
Subjective     Christy L Verner is a 49 y.o. female who presents with   Chief Complaint   Patient presents with   • Back Pain       Hand surgery thumb fused together and some nerve damage from previous surgery.   Left eye just shuts for about 15-20 seconds and wont open. No pain , no vision problems, pierce constantly : Nov vision exam   Lumbar pain: pain iis a 10         Back Pain   This is a new problem. The current episode started 1 to 4 weeks ago. The problem occurs constantly. The problem is unchanged. The pain is present in the lumbar spine and sacro-iliac. The quality of the pain is described as aching. Radiates to: left side radiates past knee. The pain is at a severity of 10/10. The pain is severe (wakes up crying at night ). The symptoms are aggravated by standing, sitting, coughing and bending. Stiffness is present all day. Associated symptoms include leg pain. Pertinent negatives include no fever, headaches, numbness, tingling or weakness. She has tried bed rest, analgesics and muscle relaxant for the symptoms. The treatment provided no relief.        Review of Systems   Constitutional: Positive for activity change. Negative for fever.   Respiratory: Negative for chest tightness and shortness of breath.    Gastrointestinal: Negative for constipation (no bowel or bladder dysfunction) and diarrhea.   Genitourinary: Negative for difficulty urinating.   Musculoskeletal: Positive for back pain and gait problem. Negative for arthralgias, joint swelling and myalgias.   Neurological: Negative for tingling, weakness, numbness and headaches.         The following portions of the patient's history were reviewed and updated as appropriate: allergies, current medications, past family history, past medical history, past social history, past surgical history and problem list.      Patient Active Problem List    Diagnosis Date Noted   • Menopause 03/21/2019   • Injury of left knee 04/25/2018   • H/O thyroidectomy  "06/02/2014     Note Last Updated: 3/18/2019     Overview:   Pt told to f/u with PCP for thyroid management         Current Outpatient Medications on File Prior to Visit   Medication Sig Dispense Refill   • cyclobenzaprine (FLEXERIL) 10 MG tablet Take 1 tablet by mouth 3 (Three) Times a Day As Needed for Muscle Spasms. 21 tablet 0   • DULoxetine (CYMBALTA) 60 MG capsule Take 60 mg by mouth Daily.     • gabapentin (NEURONTIN) 300 MG capsule Take 300 mg by mouth 4 (Four) Times a Day.     • ibuprofen (ADVIL,MOTRIN) 600 MG tablet Take 1 tablet by mouth Daily. (Patient taking differently: Take 600 mg by mouth Daily. LAST DOSE 9/16/18) 90 tablet 0   • norgestimate-ethinyl estradiol (ORTHO TRI-CYCLEN LO) 0.18/0.215/0.25 MG-25 MCG per tablet Take 1 tablet by mouth Daily. 28 tablet 6   • predniSONE (DELTASONE) 10 MG tablet Take  by mouth Daily.  0     No current facility-administered medications on file prior to visit.        Objective     /72 (BP Location: Right arm, Patient Position: Sitting, Cuff Size: Adult)   Pulse 109   Temp 97.9 °F (36.6 °C) (Oral)   Ht 162.6 cm (64\")   SpO2 99%   BMI 33.64 kg/m²     Physical Exam   Constitutional: She is oriented to person, place, and time. She appears well-developed and well-nourished. No distress.   Eyes: Conjunctivae are normal. Pupils are equal, round, and reactive to light.   Neck: Normal range of motion.   Cardiovascular: Normal rate and regular rhythm.   Pulmonary/Chest: Effort normal and breath sounds normal.   Abdominal: Soft.   Musculoskeletal: She exhibits no edema or tenderness.        Lumbar back: She exhibits decreased range of motion, pain and spasm. She exhibits no bony tenderness, no swelling and no edema.   SLR pancho negative, Ellyn negative pancho, + SI tenderness, negative piriformis to distraction   Neurological: She is alert and oriented to person, place, and time. No sensory deficit. She exhibits normal muscle tone.   Reflex Scores:       Patellar reflexes " are 2+ on the right side and 2+ on the left side.       Achilles reflexes are 2+ on the right side and 2+ on the left side.  Vitals reviewed.      Procedures    Assessment/Plan   Eduarda was seen today for back pain.    Diagnoses and all orders for this visit:    Low back pain, unspecified back pain laterality, unspecified chronicity, unspecified whether sciatica present  -     MRI Shoulder Left Without Contrast; Future  -     ketorolac (TORADOL) injection 60 mg  -     methylPREDNISolone acetate (DEPO-medrol) injection 80 mg  -     HYDROcodone-acetaminophen (NORCO) 5-325 MG per tablet; Take 1 tablet by mouth Every 8 (Eight) Hours As Needed for Moderate Pain .    Acute shoulder pain, unspecified laterality  -     MRI Shoulder Right Without Contrast; Future  -     HYDROcodone-acetaminophen (NORCO) 5-325 MG per tablet; Take 1 tablet by mouth Every 8 (Eight) Hours As Needed for Moderate Pain .    Other orders  -     Cancel: methylPREDNISolone acetate (DEPO-medrol) injection 60 mg  -     Cancel: HYDROcodone-acetaminophen (NORCO) 5-325 MG per tablet; Take 1 tablet by mouth Every 8 (Eight) Hours As Needed for Moderate Pain .        Discussion       No future appointments.  Patient rated pain as a 10/10 she is restless and complained of the shoulder pain increasing.  Just taking cymbalta at home for the pain. Follow up in 1 month, Referral for MRI .

## 2019-10-27 ENCOUNTER — HOSPITAL ENCOUNTER (OUTPATIENT)
Dept: MRI IMAGING | Facility: HOSPITAL | Age: 49
Discharge: HOME | End: 2019-10-27
Admitting: NURSE PRACTITIONER

## 2019-10-27 ENCOUNTER — HOSPITAL ENCOUNTER (OUTPATIENT)
Dept: MRI IMAGING | Facility: HOSPITAL | Age: 49
Discharge: HOME | End: 2019-10-27

## 2019-10-27 DIAGNOSIS — M54.50 LOW BACK PAIN, UNSPECIFIED BACK PAIN LATERALITY, UNSPECIFIED CHRONICITY, UNSPECIFIED WHETHER SCIATICA PRESENT: ICD-10-CM

## 2019-10-27 DIAGNOSIS — M25.519 ACUTE SHOULDER PAIN, UNSPECIFIED LATERALITY: ICD-10-CM

## 2019-10-27 PROCEDURE — 73221 MRI JOINT UPR EXTREM W/O DYE: CPT

## 2019-10-28 RX ORDER — GABAPENTIN 600 MG/1
600 TABLET ORAL NIGHTLY
COMMUNITY
Start: 2019-10-22 | End: 2020-11-19

## 2019-10-29 ENCOUNTER — TELEPHONE (OUTPATIENT)
Dept: FAMILY MEDICINE CLINIC | Facility: CLINIC | Age: 49
End: 2019-10-29

## 2019-10-30 ENCOUNTER — TELEPHONE (OUTPATIENT)
Dept: FAMILY MEDICINE CLINIC | Facility: CLINIC | Age: 49
End: 2019-10-30

## 2019-10-30 DIAGNOSIS — M19.011 ARTHRITIS OF BOTH ACROMIOCLAVICULAR JOINTS: ICD-10-CM

## 2019-10-30 DIAGNOSIS — M54.50 LOW BACK PAIN, UNSPECIFIED BACK PAIN LATERALITY, UNSPECIFIED CHRONICITY, UNSPECIFIED WHETHER SCIATICA PRESENT: ICD-10-CM

## 2019-10-30 DIAGNOSIS — S46.811A FULL THICKNESS TEAR OF RIGHT SUBSCAPULARIS TENDON: Primary | ICD-10-CM

## 2019-10-30 DIAGNOSIS — M75.102 TEAR OF LEFT SUPRASPINATUS TENDON: ICD-10-CM

## 2019-10-30 DIAGNOSIS — M19.012 ARTHRITIS OF BOTH ACROMIOCLAVICULAR JOINTS: ICD-10-CM

## 2019-10-30 DIAGNOSIS — M67.911 TENDINOPATHY OF RIGHT SHOULDER: ICD-10-CM

## 2019-10-30 DIAGNOSIS — M25.519 ACUTE SHOULDER PAIN, UNSPECIFIED LATERALITY: ICD-10-CM

## 2019-10-30 RX ORDER — HYDROCODONE BITARTRATE AND ACETAMINOPHEN 5; 325 MG/1; MG/1
1 TABLET ORAL EVERY 8 HOURS PRN
Qty: 15 TABLET | Refills: 0 | OUTPATIENT
Start: 2019-10-30 | End: 2019-11-12

## 2019-10-30 NOTE — PROGRESS NOTES
Right shoulder FULL thickness supraspinatus tendonitis mild ac joint arthritis acromial spur.  Left shoulder:PARTIAL thickness tears near full thickness mild ac arthritis. Referral placed for orthopedic surgery

## 2019-11-04 ENCOUNTER — OFFICE VISIT (OUTPATIENT)
Dept: FAMILY MEDICINE CLINIC | Facility: CLINIC | Age: 49
End: 2019-11-04

## 2019-11-04 VITALS
HEIGHT: 64 IN | DIASTOLIC BLOOD PRESSURE: 76 MMHG | SYSTOLIC BLOOD PRESSURE: 115 MMHG | TEMPERATURE: 98.6 F | HEART RATE: 108 BPM | WEIGHT: 197 LBS | BODY MASS INDEX: 33.63 KG/M2 | OXYGEN SATURATION: 98 %

## 2019-11-04 DIAGNOSIS — M54.16 RADICULOPATHY OF LUMBAR REGION: ICD-10-CM

## 2019-11-04 DIAGNOSIS — R30.0 DYSURIA: Primary | ICD-10-CM

## 2019-11-04 LAB
BILIRUB BLD-MCNC: NEGATIVE MG/DL
CLARITY, POC: ABNORMAL
COLOR UR: YELLOW
GLUCOSE UR STRIP-MCNC: NEGATIVE MG/DL
KETONES UR QL: NEGATIVE
LEUKOCYTE EST, POC: ABNORMAL
NITRITE UR-MCNC: POSITIVE MG/ML
PH UR: 5.5 [PH] (ref 5–8)
PROT UR STRIP-MCNC: ABNORMAL MG/DL
RBC # UR STRIP: ABNORMAL /UL
SP GR UR: 1.03 (ref 1–1.03)
UROBILINOGEN UR QL: NORMAL

## 2019-11-04 PROCEDURE — 96372 THER/PROPH/DIAG INJ SC/IM: CPT | Performed by: NURSE PRACTITIONER

## 2019-11-04 PROCEDURE — 81003 URINALYSIS AUTO W/O SCOPE: CPT | Performed by: NURSE PRACTITIONER

## 2019-11-04 PROCEDURE — 99213 OFFICE O/P EST LOW 20 MIN: CPT | Performed by: NURSE PRACTITIONER

## 2019-11-04 RX ORDER — NITROFURANTOIN 25; 75 MG/1; MG/1
100 CAPSULE ORAL 2 TIMES DAILY
Qty: 14 CAPSULE | Refills: 0 | Status: SHIPPED | OUTPATIENT
Start: 2019-11-04 | End: 2020-02-17

## 2019-11-04 RX ORDER — KETOROLAC TROMETHAMINE 30 MG/ML
60 INJECTION, SOLUTION INTRAMUSCULAR; INTRAVENOUS ONCE
Status: COMPLETED | OUTPATIENT
Start: 2019-11-04 | End: 2019-11-04

## 2019-11-04 RX ORDER — AMITRIPTYLINE HYDROCHLORIDE 100 MG/1
100 TABLET, FILM COATED ORAL NIGHTLY PRN
Refills: 2 | COMMUNITY
Start: 2019-10-29 | End: 2021-01-04

## 2019-11-04 RX ADMIN — KETOROLAC TROMETHAMINE 60 MG: 30 INJECTION, SOLUTION INTRAMUSCULAR; INTRAVENOUS at 16:16

## 2019-11-04 NOTE — PROGRESS NOTES
Subjective     Christy L Verner is a 49 y.o. female who presents with   Chief Complaint   Patient presents with   • Back Pain     Lower back pain       Low back pain for 2 wks cant move at night back pain so bad. Hurts to walk sometimes body gets stiff when I get in bed. Lower back pain   Low back pain : no history of injury to back       Back Pain   This is a new problem. The current episode started 1 to 4 weeks ago. The problem occurs constantly. The problem is unchanged. The quality of the pain is described as aching. The pain does not radiate. The symptoms are aggravated by bending, lying down, sitting, standing, twisting, coughing, position and stress. Stiffness is present all day. Associated symptoms include dysuria. Pertinent negatives include no bladder incontinence, bowel incontinence, numbness, tingling or weight loss. She has tried NSAIDs (hot water bottle ) for the symptoms. The treatment provided no relief.        Review of Systems   Constitutional: Negative for weight loss.   Gastrointestinal: Negative for bowel incontinence.   Genitourinary: Positive for dysuria. Negative for bladder incontinence.   Musculoskeletal: Positive for back pain.   Neurological: Negative for tingling and numbness.         The following portions of the patient's history were reviewed and updated as appropriate: allergies, current medications, past family history, past medical history, past social history, past surgical history and problem list.      Patient Active Problem List    Diagnosis Date Noted   • Complete tear of right rotator cuff 11/13/2019   • Incomplete tear of left rotator cuff 11/13/2019   • Menopause 03/21/2019   • Injury of left knee 04/25/2018   • H/O thyroidectomy 06/02/2014     Note Last Updated: 3/18/2019     Overview:   Pt told to f/u with PCP for thyroid management         Current Outpatient Medications on File Prior to Visit   Medication Sig Dispense Refill   • DULoxetine (CYMBALTA) 60 MG capsule Take  "60 mg by mouth Daily.     • gabapentin (NEURONTIN) 300 MG capsule Take 300 mg by mouth 4 (Four) Times a Day.     • gabapentin (NEURONTIN) 600 MG tablet      • norgestimate-ethinyl estradiol (ORTHO TRI-CYCLEN LO) 0.18/0.215/0.25 MG-25 MCG per tablet Take 1 tablet by mouth Daily. 28 tablet 6   • amitriptyline (ELAVIL) 100 MG tablet TK 1 T PO  QHS  2     No current facility-administered medications on file prior to visit.        Objective     /76 (BP Location: Right arm, Patient Position: Sitting, Cuff Size: Adult)   Pulse 108   Temp 98.6 °F (37 °C) (Oral)   Ht 162.6 cm (64\")   Wt 89.4 kg (197 lb)   SpO2 98%   BMI 33.81 kg/m²     Physical Exam   Constitutional: She appears well-developed and well-nourished. No distress.   Abdominal: Soft. Bowel sounds are normal. There is tenderness (no cva tenderness, mild suprapubic tenderness) in the suprapubic area. There is no rigidity, no rebound, no guarding and no CVA tenderness.   Musculoskeletal: She exhibits no edema or tenderness.        Lumbar back: She exhibits decreased range of motion, pain and spasm. She exhibits no bony tenderness, no swelling and no edema.   SLR pancho negative, Ellyn negative bi but with pain to the patient and limited ROM some deep groin pain noted , + SI tenderness, negative piriformis to distraction. Patient has trouble with SLR unable to lift legs in the air hurts to go up and down steps unable to stand or sit for long periods of time up all night moaning in agony with the pain. History of bulging  disc in the past.    Neurological: No sensory deficit. She exhibits normal muscle tone.   Reflex Scores:       Patellar reflexes are 2+ on the right side and 2+ on the left side.       Achilles reflexes are 2+ on the right side and 2+ on the left side.  Psychiatric: She has a normal mood and affect. Her behavior is normal. Judgment and thought content normal.       Procedures    Assessment/Plan   Eduarda was seen today for back " pain.    Diagnoses and all orders for this visit:    Dysuria  -     POCT urinalysis dipstick, automated  -     Urine Culture - Urine, Urine, Clean Catch  -     nitrofurantoin, macrocrystal-monohydrate, (MACROBID) 100 MG capsule; Take 1 capsule by mouth 2 (Two) Times a Day.    Radiculopathy of lumbar region  -     MRI Lumbar Spine With & Without Contrast; Future  -     ketorolac (TORADOL) injection 60 mg    UTI:positive for UTI started on medication and sent off for culture   Lower back and right hip constant pain. 10/10 on pain level visibly distraught in pain   Discussion          No future appointments.

## 2019-11-07 LAB
BACTERIA UR CULT: ABNORMAL
BACTERIA UR CULT: ABNORMAL
OTHER ANTIBIOTIC SUSC ISLT: ABNORMAL

## 2019-11-11 RX ORDER — SULFAMETHOXAZOLE AND TRIMETHOPRIM 800; 160 MG/1; MG/1
1 TABLET ORAL 2 TIMES DAILY
Qty: 14 TABLET | Refills: 0 | Status: SHIPPED | OUTPATIENT
Start: 2019-11-11 | End: 2020-02-17

## 2019-11-12 ENCOUNTER — OFFICE VISIT (OUTPATIENT)
Dept: ORTHOPEDIC SURGERY | Facility: CLINIC | Age: 49
End: 2019-11-12

## 2019-11-12 ENCOUNTER — HOSPITAL ENCOUNTER (EMERGENCY)
Facility: HOSPITAL | Age: 49
Discharge: HOME | End: 2019-11-12
Attending: EMERGENCY MEDICINE | Admitting: EMERGENCY MEDICINE

## 2019-11-12 ENCOUNTER — APPOINTMENT (OUTPATIENT)
Dept: GENERAL RADIOLOGY | Facility: HOSPITAL | Age: 49
End: 2019-11-12

## 2019-11-12 VITALS — BODY MASS INDEX: 33.6 KG/M2 | WEIGHT: 196.8 LBS | TEMPERATURE: 97.4 F | HEIGHT: 64 IN

## 2019-11-12 VITALS
TEMPERATURE: 97.7 F | BODY MASS INDEX: 40.97 KG/M2 | DIASTOLIC BLOOD PRESSURE: 99 MMHG | RESPIRATION RATE: 17 BRPM | OXYGEN SATURATION: 99 % | SYSTOLIC BLOOD PRESSURE: 128 MMHG | WEIGHT: 240 LBS | HEART RATE: 103 BPM | HEIGHT: 64 IN

## 2019-11-12 DIAGNOSIS — M25.511 ACUTE PAIN OF RIGHT SHOULDER: Primary | ICD-10-CM

## 2019-11-12 DIAGNOSIS — M75.112 INCOMPLETE TEAR OF LEFT ROTATOR CUFF, UNSPECIFIED WHETHER TRAUMATIC: ICD-10-CM

## 2019-11-12 DIAGNOSIS — M54.41 ACUTE RIGHT-SIDED LOW BACK PAIN WITH RIGHT-SIDED SCIATICA: Primary | ICD-10-CM

## 2019-11-12 DIAGNOSIS — M75.121 COMPLETE TEAR OF RIGHT ROTATOR CUFF, UNSPECIFIED WHETHER TRAUMATIC: ICD-10-CM

## 2019-11-12 PROCEDURE — 25010000002 ONDANSETRON PER 1 MG: Performed by: EMERGENCY MEDICINE

## 2019-11-12 PROCEDURE — 99283 EMERGENCY DEPT VISIT LOW MDM: CPT

## 2019-11-12 PROCEDURE — 73030 X-RAY EXAM OF SHOULDER: CPT | Performed by: ORTHOPAEDIC SURGERY

## 2019-11-12 PROCEDURE — 72110 X-RAY EXAM L-2 SPINE 4/>VWS: CPT

## 2019-11-12 PROCEDURE — 99213 OFFICE O/P EST LOW 20 MIN: CPT | Performed by: ORTHOPAEDIC SURGERY

## 2019-11-12 PROCEDURE — 96374 THER/PROPH/DIAG INJ IV PUSH: CPT

## 2019-11-12 PROCEDURE — 96375 TX/PRO/DX INJ NEW DRUG ADDON: CPT

## 2019-11-12 PROCEDURE — 25010000002 HYDROMORPHONE PER 4 MG: Performed by: EMERGENCY MEDICINE

## 2019-11-12 RX ORDER — HYDROMORPHONE HYDROCHLORIDE 1 MG/ML
0.5 INJECTION, SOLUTION INTRAMUSCULAR; INTRAVENOUS; SUBCUTANEOUS ONCE
Status: COMPLETED | OUTPATIENT
Start: 2019-11-12 | End: 2019-11-12

## 2019-11-12 RX ORDER — METHOCARBAMOL 500 MG/1
500 TABLET, FILM COATED ORAL 4 TIMES DAILY
Qty: 30 TABLET | Refills: 0 | Status: SHIPPED | OUTPATIENT
Start: 2019-11-12 | End: 2020-01-09 | Stop reason: SDUPTHER

## 2019-11-12 RX ORDER — ONDANSETRON 2 MG/ML
4 INJECTION INTRAMUSCULAR; INTRAVENOUS ONCE
Status: COMPLETED | OUTPATIENT
Start: 2019-11-12 | End: 2019-11-12

## 2019-11-12 RX ORDER — METHYLPREDNISOLONE 4 MG/1
TABLET ORAL
Qty: 21 EACH | Refills: 0 | Status: SHIPPED | OUTPATIENT
Start: 2019-11-12 | End: 2020-01-15 | Stop reason: SDUPTHER

## 2019-11-12 RX ORDER — SODIUM CHLORIDE 0.9 % (FLUSH) 0.9 %
10 SYRINGE (ML) INJECTION AS NEEDED
Status: DISCONTINUED | OUTPATIENT
Start: 2019-11-12 | End: 2019-11-12 | Stop reason: HOSPADM

## 2019-11-12 RX ORDER — HYDROCODONE BITARTRATE AND ACETAMINOPHEN 5; 325 MG/1; MG/1
1 TABLET ORAL EVERY 6 HOURS PRN
Qty: 20 TABLET | Refills: 0 | Status: SHIPPED | OUTPATIENT
Start: 2019-11-12 | End: 2019-11-26 | Stop reason: SDUPTHER

## 2019-11-12 RX ADMIN — HYDROMORPHONE HYDROCHLORIDE 0.5 MG: 1 INJECTION, SOLUTION INTRAMUSCULAR; INTRAVENOUS; SUBCUTANEOUS at 20:25

## 2019-11-12 RX ADMIN — ONDANSETRON 4 MG: 2 INJECTION INTRAMUSCULAR; INTRAVENOUS at 20:24

## 2019-11-13 ENCOUNTER — TELEPHONE (OUTPATIENT)
Dept: ORTHOPEDIC SURGERY | Facility: CLINIC | Age: 49
End: 2019-11-13

## 2019-11-13 ENCOUNTER — TELEPHONE (OUTPATIENT)
Dept: FAMILY MEDICINE CLINIC | Facility: CLINIC | Age: 49
End: 2019-11-13

## 2019-11-13 PROBLEM — M75.112 INCOMPLETE TEAR OF LEFT ROTATOR CUFF: Status: ACTIVE | Noted: 2019-11-13

## 2019-11-13 PROBLEM — M75.121 COMPLETE TEAR OF RIGHT ROTATOR CUFF: Status: ACTIVE | Noted: 2019-11-13

## 2019-11-13 NOTE — PROGRESS NOTES
New Shoulder      Patient: Christy L Verner        YOB: 1970    Medical Record Number: [id number]        Chief Complaints: Bilateral shoulder right greater than left and severe low back pain      History of Present Illness: This is a 49-year-old female who presents complaining of a for evaluation of both shoulders right is worse than left she is right-hand dominant she is a cook at [employer] her symptoms in her shoulder been ongoing for several weeks she had a fall at work a year ago was unsure if that is the culprit really does not recall a new history injury change in activity however today after lunch she started noticing severe low back pain and she is very obviously uncomfortable.  Symptoms in shoulder and back are severe constant aching burning bruising her shoulders are worse with sitting and driving better with ice and heat her back seems to be that most cause of her symptoms today.  Her past medical history is remarkable for thyroid disease      Allergies:   Allergies   Allergen Reactions   • Tramadol Nausea And Vomiting       Medications:   Home Medications:  Current Outpatient Medications on File Prior to Visit   Medication Sig   • amitriptyline (ELAVIL) 100 MG tablet TK 1 T PO  QHS   • DULoxetine (CYMBALTA) 60 MG capsule Take 60 mg by mouth Daily.   • gabapentin (NEURONTIN) 300 MG capsule Take 300 mg by mouth 4 (Four) Times a Day.   • gabapentin (NEURONTIN) 600 MG tablet    • nitrofurantoin, macrocrystal-monohydrate, (MACROBID) 100 MG capsule Take 1 capsule by mouth 2 (Two) Times a Day.   • norgestimate-ethinyl estradiol (ORTHO TRI-CYCLEN LO) 0.18/0.215/0.25 MG-25 MCG per tablet Take 1 tablet by mouth Daily.   • sulfamethoxazole-trimethoprim (BACTRIM DS,SEPTRA DS) 800-160 MG per tablet Take 1 tablet by mouth 2 (Two) Times a Day.     No current facility-administered medications on file prior to visit.      Current Medications:  Scheduled Meds:  Continuous Infusions:  No current  "facility-administered medications for this visit.   PRN Meds:.    Past Medical History:   Diagnosis Date   • Anesthesia     \"SLOW TO AWAKEN\"-PT'S MOTHER TOOK 1 WEEK TO COME OUT OF ANESTHESIA   • Disease of thyroid gland    • Knee pain, left         Past Surgical History:   Procedure Laterality Date   • APPENDECTOMY     • BACK SURGERY     •  SECTION     • FOOT SURGERY Bilateral    • KNEE ARTHROSCOPY Left 2018    Procedure: LEFT KNEE ARTHROSCOPY, EXTENSIVE CHONDROPLASTY, PARTIAL LATERAL MENISECTOMY;  Surgeon: Moe Martínez MD;  Location: [facility] OR [facility];  Service: Orthopedics   • PARTIAL HYSTERECTOMY     • THYROID SURGERY          Social History     Occupational History   • Not on file   Tobacco Use   • Smoking status: Former Smoker     Years: 7.00     Types: Cigarettes     Last attempt to quit: 2018     Years since quittin.8   • Smokeless tobacco: Never Used   • Tobacco comment: 3-4 CIGARETTES PER DAY   Substance and Sexual Activity   • Alcohol use: No   • Drug use: No   • Sexual activity: Defer    Social History     Social History Narrative   • Not on file        Family History   Problem Relation Age of Onset   • Rheum arthritis Mother              Review of Systems: 14 point review of systems are remarkable for the pertinent positives listed in the chart by the patient the remainder negative    Review of Systems      Physical Exam: 49 y.o. female  General Appearance:    Alert, cooperative, in no acute distress                 Vitals:    19 1410   Temp: 97.4 °F (36.3 °C)   TempSrc: Temporal   Weight: 89.3 kg (196 lb 12.8 oz)   Height: 162.6 cm (64\")      Patient is alert and read ×3 no acute distress appears her above-listed at height weight and age.  Affect is normal respiratory rate is normal unlabored. Heart rate regular rate rhythm, sclera, dentition and hearing are normal for the purpose of this exam.    Ortho Exam Physical exam of the right and left shoulder reveals no overlying " skin changes no lymphedema no lymphadenopathy.  Patient has active flexion 180 with mild symptoms abduction is similar external rotation is to 50 and internal rotation to the upper lumbar spine with mild symptoms.  Patient has good rotator cuff strength 4+ over 5 with isometric strength testing with pain.  Patient has a positive impingement and a positive De La Torre sign.  Patient has good cervical range of motion which is full and asymptomatic no radicular symptoms.  Patient has a normal elbow exam.  Good distal pulses are present  Patient has pain with overhead activity and a positive Neer sign and a positive empty can sign , a positive drop arm and a definitive painful arc  She has severe low back pain with any kind of sitting her range of motion is low back pain radiating to the right buttocks  Procedures          Radiology:   AP, Scapular Y and Axillary Lateral of the right shoulder were ordered/reviewed to evauate shoulder pain.  I have no compared to films she has some acromioclavicular arthritis otherwise no acute bony pathology she also has an MRI both shoulders which I have reviewed on the left shoulder she has a partial-thickness supraspinatus tendon tear which is a high-grade partial and some mild acromioclavicular arthritis MRI of her right shoulder shows a full-thickness insertional tear of the supraspinatus some acromioclavicular arthritis and a degenerative labral tear.  Imaging Results (Most Recent)     Procedure Component Value Units Date/Time    XR Shoulder 2+ View Right [[id number]] Resulted:  11/12/19 1407     Updated:  11/12/19 1407    Impression:       Ordering physician's impression is located in the Encounter Note dated 11/12/19. X-ray performed in the DR room.          Assessment/Plan: Bilateral shoulder pain with a high-grade partial rotator cuff tear on the left, a full-thickness rotator cuff tear on the right.  Today she is most uncomfortable with her low back she is miserable I have asked  her to go to the ER for evaluation my differential would certainly be a disc putting pressure on her nerve or even a kidney stone but she is quite miserable.  I did allow her to leave through the back door asked her to go to the ER she states she was going to try to go home and let this calm down as she needed to work tomorrow.  Total when her back comes down we can discuss which way to go with her shoulders.    Addendum I did speak with the patient that after seeing her in the office she did go to the ER last night they felt like it was spine in origin I with pressure on the nerve they did give her a dose pack as she has an MRI scheduled they are going to try to move that up.  I did tell her when we get her back sorted out that comes down we can discuss which way to go with the shoulders

## 2019-11-13 NOTE — TELEPHONE ENCOUNTER
Please advise. Called patient and spoke with patient surgeon would like to know what's going in with the back before they will proceed with shoulder surgery. MRI referral status moved to urgent.

## 2019-11-13 NOTE — ED PROVIDER NOTES
" EMERGENCY DEPARTMENT ENCOUNTER    CHIEF COMPLAINT  Chief Complaint: back pain   History given by: patient   History limited by: nothing  Room Number:   PMD: Mally Shaw APRN      HPI:  Pt is a 49 y.o. female who presents complaining of back pain that has ongoing for months. Pt states she injured her back after a fall at work last April. Pt also c/o bilateral shoulder pain. Pt has an appointment scheduled for MRI of back next Friday (2019). Pt states her PCP gave her a shot in her back last Monday, but she does not remember what it was. Pt reports increased pain with movement. Pt states she saw Dr. Patel (orthopedist) today who states she did not want to work on her shoulders until her back was checked out. There are no other complaints at this time.  No change in bowel or bladder.     Duration: months   Onset: gradual   Timing: constant   Location: back   Radiation: none mentioned   Quality: \"pain\"   Intensity/Severity: moderate   Progression: unchanged   Associated Symptoms: pt also c/o bilateral shoulder pain   Aggravating Factors: none mentioned   Alleviating Factors: movement   Previous Episodes: none   Treatment before arrival: pt saw Dr. Patel (orthopedist) earlier today    PAST MEDICAL HISTORY  Active Ambulatory Problems     Diagnosis Date Noted   • Injury of left knee 2018   • H/O thyroidectomy 2014   • Menopause 2019     Resolved Ambulatory Problems     Diagnosis Date Noted   • No Resolved Ambulatory Problems     Past Medical History:   Diagnosis Date   • Anesthesia    • Disease of thyroid gland    • Knee pain, left        PAST SURGICAL HISTORY  Past Surgical History:   Procedure Laterality Date   • APPENDECTOMY     • BACK SURGERY     •  SECTION     • FOOT SURGERY Bilateral    • KNEE ARTHROSCOPY Left 2018    Procedure: LEFT KNEE ARTHROSCOPY, EXTENSIVE CHONDROPLASTY, PARTIAL LATERAL MENISECTOMY;  Surgeon: Moe Martínez MD;  Location: [facility] [place] [facility] [place];  " Service: Orthopedics   • PARTIAL HYSTERECTOMY     • THYROID SURGERY         FAMILY HISTORY  Family History   Problem Relation Age of Onset   • Rheum arthritis Mother        SOCIAL HISTORY  Social History     Socioeconomic History   • Marital status:      Spouse name: Not on file   • Number of children: Not on file   • Years of education: Not on file   • Highest education level: Not on file   Tobacco Use   • Smoking status: Former Smoker     Years: 7.00     Types: Cigarettes     Last attempt to quit: 2018     Years since quittin.8   • Smokeless tobacco: Never Used   • Tobacco comment: 3-4 CIGARETTES PER DAY   Substance and Sexual Activity   • Alcohol use: No   • Drug use: No   • Sexual activity: Defer       ALLERGIES  Tramadol    REVIEW OF SYSTEMS  Review of Systems   Constitutional: Negative for fever.   Respiratory: Negative for cough and shortness of breath.    Cardiovascular: Negative for chest pain.   Gastrointestinal: Negative for abdominal pain, diarrhea and vomiting.   Genitourinary: Negative for decreased urine volume, difficulty urinating and dysuria.   Musculoskeletal: Positive for back pain. Negative for neck pain.        Bilateral shoulder pain   Skin: Negative for rash.   Allergic/Immunologic: Negative.    Neurological: Negative for weakness, numbness and headaches.   Psychiatric/Behavioral: Negative.    All other systems reviewed and are negative.      PHYSICAL EXAM  ED Triage Vitals   Temp Heart Rate Resp BP SpO2   19 1847 197 19 18419   97.7 °F (36.5 °C) 105 18 141/96 98 %      Temp src Heart Rate Source Patient Position BP Location FiO2 (%)   -- -- 19 18519 --     Lying Left arm        Physical Exam   Constitutional: She is oriented to person, place, and time. No distress.   HENT:   Head: Normocephalic and atraumatic.   Eyes: EOM are normal. Pupils are equal, round, and reactive to light.   Neck: Normal range of  motion. Neck supple.   Cardiovascular: Normal rate, regular rhythm and normal heart sounds.   Pulmonary/Chest: Effort normal and breath sounds normal. No respiratory distress.   Abdominal: Soft. There is no tenderness. There is no rebound and no guarding.   Musculoskeletal: Normal range of motion. She exhibits no edema.        Lumbar back: She exhibits tenderness.   Pain with any movement, which seems exaggerated at times.   Neurological: She is alert and oriented to person, place, and time. She has normal sensation, normal strength and normal reflexes.   Symmetrical reflexes in BLE.   Skin: Skin is warm and dry. No rash noted.   Psychiatric: Mood and affect normal.   Nursing note and vitals reviewed.      LAB RESULTS  Lab Results (last 24 hours)     ** No results found for the last 24 hours. **          I ordered the above labs and reviewed the results    RADIOLOGY  XR Spine Lumbar Complete 4+VW   Final Result           I ordered the above noted radiological studies. Interpreted by radiologist. Reviewed by me in PACS.       PROCEDURES  Procedures      PROGRESS AND CONSULTS        1940  Ordered KRISTIN report.    1941  Ordered L-spine XR for further evaluation, Dilaudid for pain, and Zofran to prevent nausea.     2000  KRISTIN #[id number] reviewed. Pt had 5 day narcotic prescription filled on 10/14/2019 and 10/30/2019. KRISTIN query complete. Treatment plan to include limited course of prescribed  controlled substance. Risks including addiction, benefits, and alternatives presented to patient.     2104  Rechecked pt. Pt is resting comfortably. Notified pt of L-spine XR results which revealed arthritis, but otherwise nothing acute. Advised pt to follow up with PCP for further evaluation and RTER if any new/worsening symptoms develop. Discussed the plan to discharge with Medrol dosepak, Robaxin, and Norco. Pt understands and agrees with the plan, all questions answered.      MEDICAL DECISION MAKING  Results were  reviewed/discussed with the patient and they were also made aware of online access. Pt also made aware that some labs, such as cultures, will not be resulted during ER visit and follow up with PMD is necessary.     MDM  Number of Diagnoses or Management Options     Amount and/or Complexity of Data Reviewed  Tests in the radiology section of CPT®: ordered and reviewed (L-spine XR= minimal anterior spurring at T11-12 and T12-L1 and L5-S1 and there is a slight tilt of the upper lumbar spine to the right as seen in the AP projection.)  Decide to obtain previous medical records or to obtain history from someone other than the patient: yes  Review and summarize past medical records: yes (Pt's previous medical records show pt was seen here in the ER on 10/11/2019 and dx with lumbar strain.)  Independent visualization of images, tracings, or specimens: yes           DIAGNOSIS  Final diagnoses:   Acute right-sided low back pain with right-sided sciatica       DISPOSITION  DISCHARGE    Patient discharged in stable condition.    Reviewed implications of results, diagnosis, meds, responsibility to follow up, warning signs and symptoms of possible worsening, potential complications and reasons to return to ER.    Patient/Family voiced understanding of above instructions.    Discussed plan for discharge, as there is no emergent indication for admission. Patient referred to primary care provider for BP management due to today's BP. Pt/family is agreeable and understands need for follow up and repeat testing.  Pt is aware that discharge does not mean that nothing is wrong but it indicates no emergency is present that requires admission and they must continue care with follow-up as given below or physician of their choice.     FOLLOW-UP  Mally Shaw, APRN  [address] [facility]  [address] [place] [id number] [phone]    Schedule an appointment as soon as possible for a visit   call office tomorrow for follow up and to see if MRI  can be done earlier         Medication List      New Prescriptions    methocarbamol 500 MG tablet  Commonly known as:  ROBAXIN  Take 1 tablet by mouth 4 (Four) Times a Day.     methylPREDNISolone 4 MG tablet  Commonly known as:  MEDROL (JASON)  Take as directed on package instructions.        Changed    HYDROcodone-acetaminophen 5-325 MG per tablet  Commonly known as:  NORCO  Take 1 tablet by mouth Every 6 (Six) Hours As Needed for Moderate Pain .  What changed:  when to take this        Stop    cyclobenzaprine 10 MG tablet  Commonly known as:  FLEXERIL     predniSONE 10 MG tablet  Commonly known as:  DELTASONE            Latest Documented Vital Signs:  As of 9:14 PM  BP- (!) 109/39 HR- 105 Temp- 97.7 °F (36.5 °C) O2 sat- 98%    --  Documentation assistance provided by nasrin Vega for Dr. Nguyen. Information recorded by the scribe was done at my direction and has been verified and validated by me.     Kaci Vega  11/12/19 2108       Dillon Nguyen MD  11/12/19 211

## 2019-11-14 NOTE — TELEPHONE ENCOUNTER
Alex BERNABE .. Can you change the status and maker her MRI urgent please patient is in a lot of pain. Surgeon pending shoulder surgery on MRI of back

## 2019-11-18 ENCOUNTER — TELEPHONE (OUTPATIENT)
Dept: FAMILY MEDICINE CLINIC | Facility: CLINIC | Age: 49
End: 2019-11-18

## 2019-11-18 ENCOUNTER — TELEPHONE (OUTPATIENT)
Dept: ORTHOPEDIC SURGERY | Facility: CLINIC | Age: 49
End: 2019-11-18

## 2019-11-18 DIAGNOSIS — R93.7 ABNORMAL MRI, SPINE: Primary | ICD-10-CM

## 2019-11-18 NOTE — TELEPHONE ENCOUNTER
Regarding: Test Results Question  Contact: [phone]  ----- Message from Mychart, Generic sent at 11/18/2019  3:32 PM EST -----    Dr.kittie berrios I got my MRI friday I'm still waiting on my result

## 2019-11-18 NOTE — TELEPHONE ENCOUNTER
Pt would like results of her MRI lumbar done Friday, 11/15 at [facility]. Report on shelf for you to review.

## 2019-11-19 ENCOUNTER — TELEPHONE (OUTPATIENT)
Dept: FAMILY MEDICINE CLINIC | Facility: CLINIC | Age: 49
End: 2019-11-19

## 2019-11-19 NOTE — TELEPHONE ENCOUNTER
I believe this is of her lumbar spine I do not see the result in the area at it may have gone to whoever ordered it

## 2019-11-19 NOTE — TELEPHONE ENCOUNTER
Patient returned missed call from Jerica Cordero-. Patient can be reached via cell [phone] & patient also responded to Jerica's Purple Harry question sent - message in previous Epic encounter. Thanks /srh

## 2019-11-19 NOTE — TELEPHONE ENCOUNTER
LMOM for patient to return call to office.  I will also send [employer]hart message to see where she got her MRI done.

## 2019-11-21 NOTE — TELEPHONE ENCOUNTER
Felisha replied via [employer] message.  Mally Shaw ordered CT.  She needs to call that office for further instruction.

## 2019-11-22 ENCOUNTER — DOCUMENTATION (OUTPATIENT)
Dept: FAMILY MEDICINE CLINIC | Facility: CLINIC | Age: 49
End: 2019-11-22

## 2019-11-22 ENCOUNTER — TELEPHONE (OUTPATIENT)
Dept: FAMILY MEDICINE CLINIC | Facility: CLINIC | Age: 49
End: 2019-11-22

## 2019-11-25 DIAGNOSIS — N83.209 CYST OF OVARY, UNSPECIFIED LATERALITY: Primary | ICD-10-CM

## 2019-11-26 ENCOUNTER — TELEPHONE (OUTPATIENT)
Dept: ORTHOPEDIC SURGERY | Facility: CLINIC | Age: 49
End: 2019-11-26

## 2019-12-02 ENCOUNTER — TELEPHONE (OUTPATIENT)
Dept: ORTHOPEDIC SURGERY | Facility: CLINIC | Age: 49
End: 2019-12-02

## 2019-12-02 DIAGNOSIS — M54.50 LOW BACK PAIN, UNSPECIFIED BACK PAIN LATERALITY, UNSPECIFIED CHRONICITY, UNSPECIFIED WHETHER SCIATICA PRESENT: Primary | ICD-10-CM

## 2019-12-02 NOTE — TELEPHONE ENCOUNTER
Regarding: Non-Urgent Medical Question  Contact: [phone]  ----- Message from Mychart, Generic sent at 11/29/2019  4:07 AM EST -----  MY CHART MESSAGE:    When dr berrios see my results with my last mri done with contrast can you please give me a call at [phone]

## 2019-12-03 DIAGNOSIS — M54.16 LUMBAR BACK PAIN WITH RADICULOPATHY AFFECTING RIGHT LOWER EXTREMITY: Primary | ICD-10-CM

## 2019-12-05 ENCOUNTER — TELEPHONE (OUTPATIENT)
Dept: ORTHOPEDIC SURGERY | Facility: CLINIC | Age: 49
End: 2019-12-05

## 2019-12-05 DIAGNOSIS — M54.16 LUMBAR BACK PAIN WITH RADICULOPATHY AFFECTING RIGHT LOWER EXTREMITY: Primary | ICD-10-CM

## 2019-12-05 NOTE — TELEPHONE ENCOUNTER
Patient's CT abdomen and pelvis needs to be changed to MRI of the pelvis, with and without, as recommended by the radiology in her prior CT abdomen report.  I will send this to Adriane to take care of, per Dr. Patel.

## 2019-12-09 ENCOUNTER — HOSPITAL ENCOUNTER (OUTPATIENT)
Dept: GENERAL RADIOLOGY | Facility: HOSPITAL | Age: 49
Discharge: HOME | End: 2019-12-09
Admitting: PAIN MEDICINE

## 2019-12-09 DIAGNOSIS — M25.562 LEFT KNEE PAIN, UNSPECIFIED CHRONICITY: ICD-10-CM

## 2019-12-09 PROCEDURE — 73560 X-RAY EXAM OF KNEE 1 OR 2: CPT

## 2019-12-26 ENCOUNTER — HOSPITAL ENCOUNTER (OUTPATIENT)
Dept: MRI IMAGING | Facility: HOSPITAL | Age: 49
Discharge: HOME | End: 2019-12-26
Admitting: ORTHOPAEDIC SURGERY

## 2019-12-26 DIAGNOSIS — M54.16 LUMBAR BACK PAIN WITH RADICULOPATHY AFFECTING RIGHT LOWER EXTREMITY: ICD-10-CM

## 2019-12-26 PROCEDURE — 0 GADOBENATE DIMEGLUMINE 529 MG/ML SOLUTION: Performed by: ORTHOPAEDIC SURGERY

## 2019-12-26 PROCEDURE — 72197 MRI PELVIS W/O & W/DYE: CPT

## 2019-12-26 PROCEDURE — A9577 INJ MULTIHANCE: HCPCS | Performed by: ORTHOPAEDIC SURGERY

## 2019-12-26 RX ADMIN — GADOBENATE DIMEGLUMINE 20 ML: 529 INJECTION, SOLUTION INTRAVENOUS at 15:36

## 2020-01-06 ENCOUNTER — TELEPHONE (OUTPATIENT)
Dept: ORTHOPEDIC SURGERY | Facility: CLINIC | Age: 50
End: 2020-01-06

## 2020-01-06 NOTE — TELEPHONE ENCOUNTER
----- Message from Leisa Patel MD sent at 1/5/2020  7:44 PM EST -----  Please tell her hips are normal she does have arthritis in her low back and that may be the cause of her symptoms.  Does look like she has a small ovarian cyst that could be related to her prior surgery/hysterectomy she should see her gynecologist

## 2020-01-06 NOTE — TELEPHONE ENCOUNTER
Have spoken with the patient regarding the MRI of her pelvis.  Have discussed that her hip MRI is normal however she does have some arthritis in her lumbar spine.  There also was an incidental finding of a 3 cm cystic area lateral to the right psoas which is suspected to be an ovarian cyst Dr. Patel is recommending that she see her gynecologist.  Patient is scheduled to see Dr. Richmond on Friday, January 10.  Have faxed a copy of the report to his office

## 2020-01-09 ENCOUNTER — OFFICE VISIT (OUTPATIENT)
Dept: FAMILY MEDICINE CLINIC | Facility: CLINIC | Age: 50
End: 2020-01-09

## 2020-01-09 VITALS
HEIGHT: 64 IN | OXYGEN SATURATION: 98 % | WEIGHT: 204 LBS | BODY MASS INDEX: 34.83 KG/M2 | HEART RATE: 105 BPM | DIASTOLIC BLOOD PRESSURE: 70 MMHG | SYSTOLIC BLOOD PRESSURE: 110 MMHG | TEMPERATURE: 98 F

## 2020-01-09 DIAGNOSIS — Z78.0 MENOPAUSE: ICD-10-CM

## 2020-01-09 DIAGNOSIS — M54.50 LOW BACK PAIN, UNSPECIFIED BACK PAIN LATERALITY, UNSPECIFIED CHRONICITY, UNSPECIFIED WHETHER SCIATICA PRESENT: Primary | ICD-10-CM

## 2020-01-09 PROCEDURE — 99213 OFFICE O/P EST LOW 20 MIN: CPT | Performed by: NURSE PRACTITIONER

## 2020-01-09 RX ORDER — METHOCARBAMOL 500 MG/1
500 TABLET, FILM COATED ORAL 4 TIMES DAILY
Qty: 60 TABLET | Refills: 1 | Status: SHIPPED | OUTPATIENT
Start: 2020-01-09 | End: 2020-01-10

## 2020-01-09 RX ORDER — TRAZODONE HYDROCHLORIDE 100 MG/1
100 TABLET ORAL NIGHTLY
Qty: 30 TABLET | Refills: 1 | Status: SHIPPED | OUTPATIENT
Start: 2020-01-09 | End: 2020-01-10

## 2020-01-09 RX ORDER — METHOCARBAMOL 500 MG/1
500 TABLET, FILM COATED ORAL 4 TIMES DAILY
Qty: 30 TABLET | Refills: 0 | Status: SHIPPED | OUTPATIENT
Start: 2020-01-09 | End: 2020-01-09

## 2020-01-09 NOTE — PROGRESS NOTES
Subjective     Christy L Verner is a 49 y.o. female who presents with   Chief Complaint   Patient presents with   • Back Pain     Pain Managment       History of Present Illness     Review of Systems      The following portions of the patient's history were reviewed and updated as appropriate: allergies, current medications, past family history, past medical history, past social history, past surgical history and problem list.      Patient Active Problem List    Diagnosis Date Noted   • Complete tear of right rotator cuff 11/13/2019   • Incomplete tear of left rotator cuff 11/13/2019   • Menopause 03/21/2019   • Injury of left knee 04/25/2018   • H/O thyroidectomy 06/02/2014     Note Last Updated: 3/18/2019     Overview:   Pt told to f/u with PCP for thyroid management         Current Outpatient Medications on File Prior to Visit   Medication Sig Dispense Refill   • amitriptyline (ELAVIL) 100 MG tablet TK 1 T PO  QHS  2   • gabapentin (NEURONTIN) 300 MG capsule Take 300 mg by mouth 4 (Four) Times a Day.     • gabapentin (NEURONTIN) 600 MG tablet      • gabapentin (NEURONTIN) 600 MG tablet Take 1 tablet by mouth 3 (Three) Times a Day. 90 tablet 0   • HYDROcodone-acetaminophen (NORCO) 5-325 MG per tablet Take 1 tablet by mouth 2 (Two) Times a Day As Needed. 60 tablet 0   • methylPREDNISolone (MEDROL, JASON,) 4 MG tablet Take as directed on package instructions. 21 each 0   • nitrofurantoin, macrocrystal-monohydrate, (MACROBID) 100 MG capsule Take 1 capsule by mouth 2 (Two) Times a Day. 14 capsule 0   • norgestimate-ethinyl estradiol (ORTHO TRI-CYCLEN LO) 0.18/0.215/0.25 MG-25 MCG per tablet Take 1 tablet by mouth Daily. 28 tablet 6   • sulfamethoxazole-trimethoprim (BACTRIM DS,SEPTRA DS) 800-160 MG per tablet Take 1 tablet by mouth 2 (Two) Times a Day. 14 tablet 0   • [DISCONTINUED] DULoxetine (CYMBALTA) 60 MG capsule Take 60 mg by mouth Daily.     • [DISCONTINUED] methocarbamol (ROBAXIN) 500 MG tablet Take 1 tablet by  "mouth 4 (Four) Times a Day. 30 tablet 0   • [DISCONTINUED] tiZANidine (ZANAFLEX) 4 MG tablet Take 1 tablet by mouth every night at bedtime as needed. 30 tablet 0     No current facility-administered medications on file prior to visit.        Objective     /70 (BP Location: Right arm, Patient Position: Sitting, Cuff Size: Adult)   Pulse 105   Temp 98 °F (36.7 °C) (Oral)   Ht 162.6 cm (64\")   Wt 92.5 kg (204 lb)   SpO2 98%   BMI 35.02 kg/m²     Physical Exam   Constitutional: She appears well-developed and well-nourished. No distress.   HENT:   Head: Normocephalic.   Eyes: Pupils are equal, round, and reactive to light. Conjunctivae are normal.   Neck: Normal range of motion.   Cardiovascular: Normal rate and regular rhythm.   Pulmonary/Chest: Effort normal and breath sounds normal.   Abdominal: Soft. Bowel sounds are normal.   Musculoskeletal: She exhibits no edema or tenderness.        Lumbar back: She exhibits decreased range of motion, pain and spasm. She exhibits no bony tenderness, no swelling and no edema.   SLR pancho negative, Ellyn negative pancho, + SI tenderness, negative piriformis to distraction   Neurological: She is alert. No sensory deficit. She exhibits normal muscle tone.   Reflex Scores:       Patellar reflexes are 2+ on the right side and 2+ on the left side.       Achilles reflexes are 2+ on the right side and 2+ on the left side.  Skin: Skin is warm.   Vitals reviewed.      Procedures    Assessment/Plan   There are no diagnoses linked to this encounter.     Discussion  HTN :patient will keep BP log and will monitor BP daily for 2 wks and follow up patient states when at pain management there is always a really long wait in pain and then she gets stressed waiting on service. In lot of debilitating pain daily.     Follow up in 2 wks     Future Appointments   Date Time Provider Department Center   1/10/2020  9:30 AM Max Rihcmond MD [facility] None   1/15/2020  1:30 PM Olu, " Noe STRINGER MD [facility] None

## 2020-01-10 ENCOUNTER — OFFICE VISIT (OUTPATIENT)
Dept: OBSTETRICS AND GYNECOLOGY | Facility: CLINIC | Age: 50
End: 2020-01-10

## 2020-01-10 VITALS — HEIGHT: 64 IN | BODY MASS INDEX: 35 KG/M2 | DIASTOLIC BLOOD PRESSURE: 90 MMHG | SYSTOLIC BLOOD PRESSURE: 148 MMHG

## 2020-01-10 DIAGNOSIS — N89.8 VAGINAL ODOR: Primary | ICD-10-CM

## 2020-01-10 DIAGNOSIS — N83.201 CYST OF RIGHT OVARY: ICD-10-CM

## 2020-01-10 DIAGNOSIS — Z01.419 VISIT FOR GYNECOLOGIC EXAMINATION: ICD-10-CM

## 2020-01-10 PROCEDURE — 99213 OFFICE O/P EST LOW 20 MIN: CPT | Performed by: OBSTETRICS & GYNECOLOGY

## 2020-01-10 PROCEDURE — 99386 PREV VISIT NEW AGE 40-64: CPT | Performed by: OBSTETRICS & GYNECOLOGY

## 2020-01-10 RX ORDER — TRAZODONE HYDROCHLORIDE 100 MG/1
100 TABLET ORAL NIGHTLY
Qty: 30 TABLET | Refills: 1 | Status: SHIPPED | OUTPATIENT
Start: 2020-01-10 | End: 2020-06-02 | Stop reason: SDUPTHER

## 2020-01-10 RX ORDER — METHOCARBAMOL 500 MG/1
500 TABLET, FILM COATED ORAL 4 TIMES DAILY
Qty: 60 TABLET | Refills: 1 | Status: SHIPPED | OUTPATIENT
Start: 2020-01-10 | End: 2020-05-19 | Stop reason: SDUPTHER

## 2020-01-11 NOTE — PROGRESS NOTES
"[facility]  [address], [address]  [place], [place] [address]  Phone: [phone] / Fax:  [phone]      01/10/2020    Lisa REYES [facility] [address]    Mally Shaw APRN    Chief Complaint   Patient presents with   • Gynecologic Exam     Np Annual Exam, last pap -Hyst., mammogram 3-21-19.. Patient jaylen MRI that showed cyst. She also c/o vaginal odor.       Christy L Verner is here for annual gynecologic exam.  HPI - Patient had last mammogram one year ago.  She underwent a hysterectomy in past for benign indications.  She has not had an annual exam in over a year.  Patient reports vaginal discharge with odor.  In addition, she has chronic back pain from herniated disc that likely resulted from a work-related accident.  She is scheduled to see a back surgeon in the next week; incidentally, on radiologic studies of the back, a 3 cm right ovarian cyst was identified.  It was suggested that she proceed with gynecologic evaluation.    Past Medical History:   Diagnosis Date   • Anesthesia     \"SLOW TO AWAKEN\"-PT'S MOTHER TOOK 1 WEEK TO COME OUT OF ANESTHESIA   • Disease of thyroid gland    • Knee pain, left        Past Surgical History:   Procedure Laterality Date   • APPENDECTOMY     • BACK SURGERY     •  SECTION     • FOOT SURGERY Bilateral    • KNEE ARTHROSCOPY Left 2018    Procedure: LEFT KNEE ARTHROSCOPY, EXTENSIVE CHONDROPLASTY, PARTIAL LATERAL MENISECTOMY;  Surgeon: Moe Martínez MD;  Location: [facility] [place];  Service: Orthopedics   • PARTIAL HYSTERECTOMY     • THYROID SURGERY         Allergies   Allergen Reactions   • Tramadol Nausea And Vomiting       Social History     Socioeconomic History   • Marital status:      Spouse name: Not on file   • Number of children: Not on file   • Years of education: Not on file   • Highest education level: Not on file   Tobacco Use   • Smoking status: Former Smoker     Years: 7.00     Types: Cigarettes     Last attempt to quit: " "2018     Years since quittin.0   • Smokeless tobacco: Never Used   • Tobacco comment: 3-4 CIGARETTES PER DAY   Substance and Sexual Activity   • Alcohol use: No   • Drug use: No   • Sexual activity: Yes     Birth control/protection: Surgical       Family History   Problem Relation Age of Onset   • Rheum arthritis Mother        No LMP recorded. Patient has had a hysterectomy.    OB History        5    Para   5    Term   5            AB        Living           SAB        TAB        Ectopic        Molar        Multiple        Live Births                    Vitals:    01/10/20 0952   BP: 148/90   Height: 162.6 cm (64.02\")       Physical Exam   Constitutional: She appears well-developed and well-nourished.   Genitourinary: Pelvic exam was performed with patient supine. There is no tenderness or lesion on the right labia. There is no tenderness or lesion on the left labia. Vaginal discharge found. Right adnexum palpable. Left adnexum palpable.   HENT:   Right Ear: External ear normal.   Left Ear: External ear normal.   Nose: Nose normal.   Eyes: Conjunctivae are normal.   Neck: Normal range of motion. No thyromegaly present.   Cardiovascular: Normal rate, regular rhythm and normal heart sounds.   Pulmonary/Chest: Effort normal. No stridor. She has no wheezes.   Abdominal: Soft. There is no tenderness. There is no guarding.   Musculoskeletal: Normal range of motion. She exhibits no edema.   Neurological: She is alert. Coordination normal.   Skin: Skin is warm and dry.   Vitals reviewed.      Eduarda was seen today for gynecologic exam.    Diagnoses and all orders for this visit:    Vaginal odor  -     NuSwab VG+ - Swab, Vagina  -     Await cultures.  Treat based on results.    Visit for gynecologic examination        -     Discussed importance of regular screening and breast awareness.  Mammogram up to date.    Cyst of right ovary        -     Cyst is unlikely cause of pain.  Patient should have " evaluation of back first and if pain not improved or not related to back, patient to return for sonogram/further evaluation.    MD Max Bauman MD

## 2020-01-13 ENCOUNTER — TELEPHONE (OUTPATIENT)
Dept: OBSTETRICS AND GYNECOLOGY | Facility: CLINIC | Age: 50
End: 2020-01-13

## 2020-01-13 LAB
A VAGINAE DNA VAG QL NAA+PROBE: ABNORMAL SCORE
BVAB2 DNA VAG QL NAA+PROBE: ABNORMAL SCORE
C ALBICANS DNA VAG QL NAA+PROBE: NEGATIVE
C GLABRATA DNA VAG QL NAA+PROBE: NEGATIVE
C TRACH RRNA SPEC QL NAA+PROBE: NEGATIVE
MEGA1 DNA VAG QL NAA+PROBE: ABNORMAL SCORE
N GONORRHOEA RRNA SPEC QL NAA+PROBE: NEGATIVE
T VAGINALIS RRNA SPEC QL NAA+PROBE: NEGATIVE

## 2020-01-15 ENCOUNTER — OFFICE VISIT (OUTPATIENT)
Dept: ORTHOPEDIC SURGERY | Facility: CLINIC | Age: 50
End: 2020-01-15

## 2020-01-15 VITALS — HEIGHT: 64 IN | BODY MASS INDEX: 34.38 KG/M2 | WEIGHT: 201.4 LBS | TEMPERATURE: 97.5 F

## 2020-01-15 DIAGNOSIS — M54.50 LUMBAR BACK PAIN: ICD-10-CM

## 2020-01-15 DIAGNOSIS — M54.32 SCIATICA OF LEFT SIDE: Primary | ICD-10-CM

## 2020-01-15 PROCEDURE — 99204 OFFICE O/P NEW MOD 45 MIN: CPT | Performed by: ORTHOPAEDIC SURGERY

## 2020-01-15 RX ORDER — METHYLPREDNISOLONE 4 MG/1
TABLET ORAL
Qty: 21 TABLET | Refills: 0 | Status: SHIPPED | OUTPATIENT
Start: 2020-01-15 | End: 2020-02-17

## 2020-01-15 NOTE — PROGRESS NOTES
"New patient or new problem visit    Chief Complaint   Patient presents with   • Lumbar Spine - Establish Care, Pain       HPI: She is seen for low back pain ongoing months activity aggravated severe stabbing aching constant and radiating to the left hip and knee.  She seemed to be moving a lot and seems uncomfortable standing.  She has an epidural injection scheduled through pain management at \"pain relief center [facility] \".  She is a daughter of 1 of my surgical patients and works as a cook at [employer].    PFSH: See chart- reviewed    Review of Systems   Constitutional: Positive for activity change. Negative for chills, fever and unexpected weight change.   HENT: Negative for trouble swallowing and voice change.    Eyes: Negative for visual disturbance.   Respiratory: Negative for cough and shortness of breath.    Cardiovascular: Negative for chest pain and leg swelling.   Gastrointestinal: Positive for abdominal pain. Negative for nausea and vomiting.   Endocrine: Negative for cold intolerance and heat intolerance.   Genitourinary: Positive for difficulty urinating, flank pain and pelvic pain. Negative for dysuria, frequency and urgency.   Musculoskeletal: Positive for back pain.   Skin: Negative for rash and wound.   Allergic/Immunologic: Negative for immunocompromised state.   Neurological: Positive for weakness, numbness and headaches.   Hematological: Does not bruise/bleed easily.   Psychiatric/Behavioral: Positive for dysphoric mood. The patient is not nervous/anxious.        PE: Constitutional: Vital signs above-noted.  Awake, alert and oriented    Psychiatric: Affect and insight do not appear grossly disturbed.    Pulmonary: Breathing is unlabored, color is good.    Skin: Warm, dry and normal turgor    Cardiac: Pedal pulses intact.  No edema.    Eyesight and hearing appear adequate for examination purposes      Musculoskeletal:  There is no tenderness to percussion and palpation of the " spine. Motion appears guarded and stiff.  Posture is unremarkable to coronal and sagittal inspection.    The skin about the area is notable for a midline tattoo with her name.  There is no palpable or visible deformity.  There is no local spasm.       Neurologic:   Reflexes are 2+ and symmetrical in the patellae and achilles.   Motor function is undisturbed in quadriceps, EHL, and gastrocnemius   sensation appears symmetrically intact to light touch.  In the bilateral lower extremities there is no evidence of atrophy.   Clonus is absent..  Gait appears undisturbed.  SLR test negative      MEDICAL DECISION MAKING    XRAY: Plain film x-rays of the lumbar spine from November show slight loss of lordosis and some lumbosacral disc space narrowing.  Report of MRI from [facility] was interpreted to reveal multilevel degenerative change mostly lower lumbar region with scattered disc prominences without obvious stenosis.  The report alone is not helpful to me.    Other: n/a    Impression: She seems very uncomfortable and may have an annular tear herniated disc.  I want to see the MRI personally    Plan: For now a Dosepak and I will look at the MRI myself and give her a call with further recommendations.  The upcoming epidural should help.  Answers for HPI/ROS submitted by the patient on 1/10/2020   Back pain  Chronicity: new  Onset: more than 1 month ago  Frequency: constantly  Progression since onset: rapidly worsening  Pain location: sacro-iliac  Pain quality: aching, shooting, stabbing  Radiates to: left knee, left thigh  Pain - numeric: 10/10  Pain is: the same all the time  Aggravated by: bending, lying down, sitting, standing, twisting  Stiffness is present: all day  bladder incontinence: No  bowel incontinence: No  leg pain: Yes  paresis: No  paresthesias: No  perianal numbness: No  tingling: Yes  weight loss: Yes

## 2020-01-21 RX ORDER — METRONIDAZOLE 500 MG/1
500 TABLET ORAL 2 TIMES DAILY
Qty: 14 TABLET | Refills: 0 | Status: SHIPPED | OUTPATIENT
Start: 2020-01-21 | End: 2020-01-22 | Stop reason: SDUPTHER

## 2020-01-21 NOTE — TELEPHONE ENCOUNTER
Patient called back she just checked her voice mail and got message about medication and said that she checked pharmacy and they said they don't have a prescription for her. Could this be resent I checked her chart and could not find a prescription to call and check on.

## 2020-01-22 ENCOUNTER — TELEPHONE (OUTPATIENT)
Dept: OBSTETRICS AND GYNECOLOGY | Facility: CLINIC | Age: 50
End: 2020-01-22

## 2020-01-22 ENCOUNTER — TELEPHONE (OUTPATIENT)
Dept: ORTHOPEDIC SURGERY | Facility: CLINIC | Age: 50
End: 2020-01-22

## 2020-01-22 RX ORDER — METRONIDAZOLE 500 MG/1
500 TABLET ORAL 2 TIMES DAILY
Qty: 14 TABLET | Refills: 0 | Status: SHIPPED | OUTPATIENT
Start: 2020-01-22 | End: 2020-02-17

## 2020-01-22 NOTE — TELEPHONE ENCOUNTER
Per LEONGW, MRI shows wear and tear changes but no pinched nerves, try epidurals and follow up if they don't help.     Spoke with patient and informed, she would rather not have epidurals.  She has a follow up appt already scheduled with EMILE on 2/18/2020 which she will keep and discuss the possibility of other options.

## 2020-01-24 ENCOUNTER — TELEPHONE (OUTPATIENT)
Dept: FAMILY MEDICINE CLINIC | Facility: CLINIC | Age: 50
End: 2020-01-24

## 2020-01-29 DIAGNOSIS — M75.112 INCOMPLETE TEAR OF LEFT ROTATOR CUFF, UNSPECIFIED WHETHER TRAUMATIC: Primary | ICD-10-CM

## 2020-02-03 ENCOUNTER — OFFICE VISIT (OUTPATIENT)
Dept: ORTHOPEDIC SURGERY | Facility: CLINIC | Age: 50
End: 2020-02-03

## 2020-02-03 VITALS — WEIGHT: 201 LBS | BODY MASS INDEX: 34.31 KG/M2 | TEMPERATURE: 98.4 F | HEIGHT: 64 IN

## 2020-02-03 DIAGNOSIS — M25.512 CHRONIC LEFT SHOULDER PAIN: Primary | ICD-10-CM

## 2020-02-03 DIAGNOSIS — G89.29 CHRONIC LEFT SHOULDER PAIN: Primary | ICD-10-CM

## 2020-02-03 DIAGNOSIS — S46.012D TRAUMATIC INCOMPLETE TEAR OF LEFT ROTATOR CUFF, SUBSEQUENT ENCOUNTER: ICD-10-CM

## 2020-02-03 PROCEDURE — 99213 OFFICE O/P EST LOW 20 MIN: CPT | Performed by: ORTHOPAEDIC SURGERY

## 2020-02-03 PROCEDURE — 73030 X-RAY EXAM OF SHOULDER: CPT | Performed by: ORTHOPAEDIC SURGERY

## 2020-02-03 NOTE — PROGRESS NOTES
"Bilateral Shoulder Follow Up      Patient: Christy L Verner        YOB: 1970            Chief Complaints: bilateral shoulder pain      History of Present Illness: Patient is here follow-up bilateral shoulder pain she has MRIs of both with which on the left show a near full-thickness tear of the rotator cuff on the right she does have a full-thickness tear.  The left is worse than the right she wishes to proceed to the next step.      Physical Exam: 49 y.o. female  General Appearance:    Alert, cooperative, in no acute distress                   Vitals:    02/03/20 1521   Temp: 98.4 °F (36.9 °C)   Weight: 91.2 kg (201 lb)   Height: 162.6 cm (64\")        Patient is alert and read ×3 no acute distress appears her above-listed at height weight and age.  Affect is normal respiratory rate is normal unlabored. Heart rate regular rate rhythm, sclera, dentition and hearing are normal for the purpose of this exam.      Ortho Exam    Physical exam of the left shoulder reveals no overlying skin changes no lymphedema no lymphadenopathy.  Patient has active flexion 180 with mild symptoms abduction is similar external rotation is to 50 and internal rotation to the upper lumbar spine with mild symptoms.  Patient has good rotator cuff strength 4+ over 5 with isometric strength testing with pain.  Patient has a positive impingement and a positive De La Torre sign.  Patient has good cervical range of motion which is full and asymptomatic no radicular symptoms.  Patient has a normal elbow exam.  Good distal pulses are presentPatient has pain with overhead activity and a positive Neer sign and a positive empty can sign  They have a positive drop arm any definitive painful arc  Physical Exam: 49 y.o. female  General Appearance:    Alert, cooperative, in no acute distress                      Vitals:    02/03/20 1521   Temp: 98.4 °F (36.9 °C)   Weight: 91.2 kg (201 lb)   Height: 162.6 cm (64\")        Head:    Normocephalic, " without obvious abnormality, atraumatic   Eyes:            conjunctivae and sclerae normal, no pallor, corneas clear,    Ears:    Ears appear intact with no abnormalities noted   Throat:   No oral lesions, no thrush, oral mucosa moist   Neck:   No adenopathy, supple, trachea midline, no thyromegaly,    Back:     No kyphosis present, no scoliosis present, no skin lesions,      erythema or scars, no tenderness to percussion or                   palpation,   range of motion normal   Lungs:     Clear to auscultation,respirations regular, even and                  unlabored    Heart:    Regular rhythm and normal rate               Chest Wall:    No abnormalities observed               Pulses:   Pulses palpable and equal bilaterally   Skin:   No bleeding, bruising or rash   Lymph nodes:   No palpable adenopathy   Neurologic:   Appears neurologic intact         X-rays AP scapular on x-ray lateral left shoulder were taken to evaluate her symptoms I did compared to previous films these look good she does have some mild narrowing of her acromioclavicular joint otherwise no acute pathology also reviewed MRI left shoulder which results are as above      Assessment/Plan:      Left shoulder rotator cuff tear plan is to proceed with repair she understands the perioperative regimen also understands risk benefits and alternatives The patient voiced understanding of the risks, benefits, and alternative forms of treatment that were discussed and the patient consents to proceed with the above listed surgery.  All risks, benefits and alternatives were discussed.  Risks including to but not exclusive to anesthetic complications, including death, MI, CVA, infection, bleeding DVT, fracture, residual pain and need for future surgery.  She understands and agrees to proceed

## 2020-02-05 ENCOUNTER — TELEPHONE (OUTPATIENT)
Dept: ORTHOPEDIC SURGERY | Facility: CLINIC | Age: 50
End: 2020-02-05

## 2020-02-05 NOTE — TELEPHONE ENCOUNTER
Spoke with pt and let her know [facility] will be contacting her as soon as she has all the information she needs. I told her if she hasn't heard anything by the end of the week to give us a call or she can write us again on [employer]. She gave verbal understanding.

## 2020-02-05 NOTE — TELEPHONE ENCOUNTER
----- Message from Christy L. Verner sent at 2/5/2020  8:43 AM EST -----  Regarding: Referral Request  Contact: [phone]  This is Christy Verner im trying to see if the  schedule my surgery yet for arpita Patel

## 2020-02-05 NOTE — TELEPHONE ENCOUNTER
----- Message from Christy L. Verner sent at 2/5/2020  8:43 AM EST -----  Regarding: Referral Request  Contact: [phone]  MY CHART MESSAGE:    This is Christy Verner im trying to see if the  schedule my surgery yet for arpita Patel

## 2020-02-06 DIAGNOSIS — M54.50 LOW BACK PAIN, UNSPECIFIED BACK PAIN LATERALITY, UNSPECIFIED CHRONICITY, UNSPECIFIED WHETHER SCIATICA PRESENT: Primary | ICD-10-CM

## 2020-02-06 RX ORDER — CEFAZOLIN SODIUM 2 G/100ML
2 INJECTION, SOLUTION INTRAVENOUS ONCE
Status: CANCELLED | OUTPATIENT
Start: 2020-02-24 | End: 2020-02-06

## 2020-02-10 PROBLEM — S46.012A TRAUMATIC INCOMPLETE TEAR OF LEFT ROTATOR CUFF: Status: ACTIVE | Noted: 2020-02-10

## 2020-02-17 ENCOUNTER — TELEPHONE (OUTPATIENT)
Dept: ORTHOPEDIC SURGERY | Facility: CLINIC | Age: 50
End: 2020-02-17

## 2020-02-17 ENCOUNTER — APPOINTMENT (OUTPATIENT)
Dept: PREADMISSION TESTING | Facility: HOSPITAL | Age: 50
End: 2020-02-17

## 2020-02-17 VITALS
SYSTOLIC BLOOD PRESSURE: 129 MMHG | BODY MASS INDEX: 34.49 KG/M2 | RESPIRATION RATE: 16 BRPM | OXYGEN SATURATION: 99 % | HEIGHT: 64 IN | DIASTOLIC BLOOD PRESSURE: 87 MMHG | WEIGHT: 202 LBS | TEMPERATURE: 98 F | HEART RATE: 97 BPM

## 2020-02-17 LAB
ANION GAP SERPL CALCULATED.3IONS-SCNC: 12.9 MMOL/L (ref 5–15)
BUN BLD-MCNC: 16 MG/DL (ref 6–20)
BUN/CREAT SERPL: 19.8 (ref 7–25)
CALCIUM SPEC-SCNC: 8.9 MG/DL (ref 8.6–10.5)
CHLORIDE SERPL-SCNC: 102 MMOL/L (ref 98–107)
CO2 SERPL-SCNC: 25.1 MMOL/L (ref 22–29)
CREAT BLD-MCNC: 0.81 MG/DL (ref 0.57–1)
DEPRECATED RDW RBC AUTO: 41.3 FL (ref 37–54)
ERYTHROCYTE [DISTWIDTH] IN BLOOD BY AUTOMATED COUNT: 13.1 % (ref 12.3–15.4)
GFR SERPL CREATININE-BSD FRML MDRD: 91 ML/MIN/1.73
GLUCOSE BLD-MCNC: 90 MG/DL (ref 65–99)
HCT VFR BLD AUTO: 34.5 % (ref 34–46.6)
HGB BLD-MCNC: 11.2 G/DL (ref 12–15.9)
MCH RBC QN AUTO: 28.2 PG (ref 26.6–33)
MCHC RBC AUTO-ENTMCNC: 32.5 G/DL (ref 31.5–35.7)
MCV RBC AUTO: 86.9 FL (ref 79–97)
PLATELET # BLD AUTO: 211 10*3/MM3 (ref 140–450)
PMV BLD AUTO: 10.8 FL (ref 6–12)
POTASSIUM BLD-SCNC: 3.7 MMOL/L (ref 3.5–5.2)
RBC # BLD AUTO: 3.97 10*6/MM3 (ref 3.77–5.28)
SODIUM BLD-SCNC: 140 MMOL/L (ref 136–145)
WBC NRBC COR # BLD: 8.39 10*3/MM3 (ref 3.4–10.8)

## 2020-02-17 PROCEDURE — 36415 COLL VENOUS BLD VENIPUNCTURE: CPT

## 2020-02-17 PROCEDURE — 85027 COMPLETE CBC AUTOMATED: CPT | Performed by: ORTHOPAEDIC SURGERY

## 2020-02-17 PROCEDURE — 80048 BASIC METABOLIC PNL TOTAL CA: CPT | Performed by: ORTHOPAEDIC SURGERY

## 2020-02-17 NOTE — DISCHARGE INSTRUCTIONS
Take the following medications the morning of surgery:    NONE    ARRIVE AT 5:45    General Instructions:  • Do not eat solid food after midnight the night before surgery.  • You may drink clear liquids day of surgery but must stop at least one hour before your hospital arrival time.  • It is beneficial for you to have a clear drink that contains carbohydrates the day of surgery.  We suggest a 12 to 20 ounce bottle of Gatorade or Powerade for non-diabetic patients or a 12 to 20 ounce bottle of G2 or Powerade Zero for diabetic patients. (Pediatric patients, are not advised to drink a 12 to 20 ounce carbohydrate drink)    Clear liquids are liquids you can see through.  Nothing red in color.     Plain water                               Sports drinks  Sodas                                   Gelatin (Jell-O)  Fruit juices without pulp such as white grape juice and apple juice  Popsicles that contain no fruit or yogurt  Tea or coffee (no cream or milk added)  Gatorade / Powerade  G2 / Powerade Zero    • Infants may have breast milk up to four hours before surgery.  • Infants drinking formula may drink formula up to six hours before surgery.   • Patients who avoid smoking, chewing tobacco and alcohol for 4 weeks prior to surgery have a reduced risk of post-operative complications.  Quit smoking as many days before surgery as you can.  • Do not smoke, use chewing tobacco or drink alcohol the day of surgery.   • If applicable bring your C-PAP/ BI-PAP machine.  • Bring any papers given to you in the doctor’s office.  • Wear clean comfortable clothes.  • Do not wear contact lenses, false eyelashes or make-up.  Bring a case for your glasses.   • Bring crutches or walker if applicable.  • Remove all piercings.  Leave jewelry and any other valuables at home.  • Hair extensions with metal clips must be removed prior to surgery.  • The Pre-Admission Testing nurse will instruct you to bring medications if unable to obtain an  accurate list in Pre-Admission Testing.        If you were given a blood bank ID arm band remember to bring it with you the day of surgery.    Preventing a Surgical Site Infection:  • For 2 to 3 days before surgery, avoid shaving with a razor because the razor can irritate skin and make it easier to develop an infection.    • Any areas of open skin can increase the risk of a post-operative wound infection by allowing bacteria to enter and travel throughout the body.  Notify your surgeon if you have any skin wounds / rashes even if it is not near the expected surgical site.  The area will need assessed to determine if surgery should be delayed until it is healed.  • The night prior to surgery sleep in a clean bed with clean clothing.  Do not allow pets to sleep with you.  • Shower on the morning of surgery using a fresh bar of anti-bacterial soap (such as Dial) and clean washcloth.  Dry with a clean towel and dress in clean clothing.  • Ask your surgeon if you will be receiving antibiotics prior to surgery.  • Make sure you, your family, and all healthcare providers clean their hands with soap and water or an alcohol based hand  before caring for you or your wound.    Day of surgery:  Your arrival time is approximately two hours before your scheduled surgery time.  Upon arrival, a Pre-op nurse and Anesthesiologist will review your health history, obtain vital signs, and answer questions you may have.  The only belongings needed at this time will be a list of your home medications and if applicable your C-PAP/BI-PAP machine.  If you are staying overnight your family can leave the rest of your belongings in the car and bring them to your room later.  A Pre-op nurse will start an IV and you may receive medication in preparation for surgery, including something to help you relax.  Your family will be able to see you in the Pre-op area.  Two visitors at a time will be allowed in the Pre-op room.  While you are in  surgery your family should notify the waiting room  if they leave the waiting room area and provide a contact phone number.    Please be aware that surgery does come with discomfort.  We want to make every effort to control your discomfort so please discuss any uncontrolled symptoms with your nurse.   Your doctor will most likely have prescribed pain medications.      If you are going home after surgery you will receive individualized written care instructions before being discharged.  A responsible adult must drive you to and from the hospital on the day of your surgery and stay with you for 24 hours.    If you are staying overnight following surgery, you will be transported to your hospital room following the recovery period.  [facility] has all private rooms.    If you have any questions please call Pre-Admission Testing at [phone].  Deductibles and co-payments are collected on the day of service. Please be prepared to pay the required co-pay, deductible or deposit on the day of service as defined by your plan.

## 2020-02-17 NOTE — TELEPHONE ENCOUNTER
----- Message from Christy L. Verner sent at 2/17/2020  4:20 AM EST -----  Regarding: Referral Request  Contact: [phone]  Im getting ready to go out on short my examiner valentin frausto fax a paper to the house am i suppose to pick it up or it will be fax she said she fax it 3 times and still havent received it can someone tell me what i got to do to get the paper or will it be fax back

## 2020-02-24 ENCOUNTER — HOSPITAL ENCOUNTER (OUTPATIENT)
Facility: HOSPITAL | Age: 50
Setting detail: HOSPITAL OUTPATIENT SURGERY
Discharge: HOME | End: 2020-02-24
Attending: ORTHOPAEDIC SURGERY | Admitting: ORTHOPAEDIC SURGERY

## 2020-02-24 ENCOUNTER — ANESTHESIA EVENT (OUTPATIENT)
Dept: PERIOP | Facility: HOSPITAL | Age: 50
End: 2020-02-24

## 2020-02-24 ENCOUNTER — ANESTHESIA (OUTPATIENT)
Dept: PERIOP | Facility: HOSPITAL | Age: 50
End: 2020-02-24

## 2020-02-24 VITALS
RESPIRATION RATE: 16 BRPM | HEART RATE: 98 BPM | DIASTOLIC BLOOD PRESSURE: 85 MMHG | SYSTOLIC BLOOD PRESSURE: 128 MMHG | OXYGEN SATURATION: 97 % | TEMPERATURE: 98.3 F

## 2020-02-24 DIAGNOSIS — S46.012D TRAUMATIC INCOMPLETE TEAR OF LEFT ROTATOR CUFF, SUBSEQUENT ENCOUNTER: ICD-10-CM

## 2020-02-24 PROCEDURE — C9290 INJ, BUPIVACAINE LIPOSOME: HCPCS | Performed by: STUDENT IN AN ORGANIZED HEALTH CARE EDUCATION/TRAINING PROGRAM

## 2020-02-24 PROCEDURE — 25010000002 FENTANYL CITRATE (PF) 100 MCG/2ML SOLUTION: Performed by: STUDENT IN AN ORGANIZED HEALTH CARE EDUCATION/TRAINING PROGRAM

## 2020-02-24 PROCEDURE — 25010000002 MIDAZOLAM PER 1 MG: Performed by: STUDENT IN AN ORGANIZED HEALTH CARE EDUCATION/TRAINING PROGRAM

## 2020-02-24 PROCEDURE — 25010000002 NEOSTIGMINE PER 0.5 MG: Performed by: REGISTERED NURSE

## 2020-02-24 PROCEDURE — 25010000002 DEXAMETHASONE PER 1 MG: Performed by: REGISTERED NURSE

## 2020-02-24 PROCEDURE — 25010000002 PROPOFOL 10 MG/ML EMULSION: Performed by: REGISTERED NURSE

## 2020-02-24 PROCEDURE — 25010000003 CEFAZOLIN IN DEXTROSE 2-4 GM/100ML-% SOLUTION: Performed by: ORTHOPAEDIC SURGERY

## 2020-02-24 PROCEDURE — 25010000002 MIDAZOLAM PER 1 MG

## 2020-02-24 PROCEDURE — 25010000002 ONDANSETRON PER 1 MG: Performed by: REGISTERED NURSE

## 2020-02-24 PROCEDURE — 25010000002 PHENYLEPHRINE PER 1 ML: Performed by: REGISTERED NURSE

## 2020-02-24 PROCEDURE — 25010000003 BUPIVACAINE LIPOSOME 1.3 % SUSPENSION: Performed by: STUDENT IN AN ORGANIZED HEALTH CARE EDUCATION/TRAINING PROGRAM

## 2020-02-24 PROCEDURE — 25010000002 KETOROLAC TROMETHAMINE PER 15 MG: Performed by: REGISTERED NURSE

## 2020-02-24 PROCEDURE — C1713 ANCHOR/SCREW BN/BN,TIS/BN: HCPCS | Performed by: ORTHOPAEDIC SURGERY

## 2020-02-24 PROCEDURE — 25010000002 EPINEPHRINE PER 0.1 MG: Performed by: ORTHOPAEDIC SURGERY

## 2020-02-24 PROCEDURE — 23412 REPAIR ROTATOR CUFF CHRONIC: CPT | Performed by: ORTHOPAEDIC SURGERY

## 2020-02-24 DEVICE — SUT FW 2/0 38IN 1BLU 1BLK/WHT  AR7201: Type: IMPLANTABLE DEVICE | Site: SHOULDER | Status: FUNCTIONAL

## 2020-02-24 DEVICE — SYS SUT/ANCH BIOCOMP SPEEDBRIDGE SWIVELK 4.75X19.1: Type: IMPLANTABLE DEVICE | Site: SHOULDER | Status: FUNCTIONAL

## 2020-02-24 RX ORDER — HYDROMORPHONE HYDROCHLORIDE 1 MG/ML
0.5 INJECTION, SOLUTION INTRAMUSCULAR; INTRAVENOUS; SUBCUTANEOUS
Status: DISCONTINUED | OUTPATIENT
Start: 2020-02-24 | End: 2020-02-25 | Stop reason: HOSPADM

## 2020-02-24 RX ORDER — SODIUM CHLORIDE, SODIUM LACTATE, POTASSIUM CHLORIDE, CALCIUM CHLORIDE 600; 310; 30; 20 MG/100ML; MG/100ML; MG/100ML; MG/100ML
9 INJECTION, SOLUTION INTRAVENOUS CONTINUOUS
Status: DISCONTINUED | OUTPATIENT
Start: 2020-02-24 | End: 2020-02-25 | Stop reason: HOSPADM

## 2020-02-24 RX ORDER — PROMETHAZINE HYDROCHLORIDE 25 MG/ML
12.5 INJECTION, SOLUTION INTRAMUSCULAR; INTRAVENOUS ONCE AS NEEDED
Status: DISCONTINUED | OUTPATIENT
Start: 2020-02-24 | End: 2020-02-25 | Stop reason: HOSPADM

## 2020-02-24 RX ORDER — NALOXONE HCL 0.4 MG/ML
0.2 VIAL (ML) INJECTION AS NEEDED
Status: DISCONTINUED | OUTPATIENT
Start: 2020-02-24 | End: 2020-02-25 | Stop reason: HOSPADM

## 2020-02-24 RX ORDER — FENTANYL CITRATE 50 UG/ML
50 INJECTION, SOLUTION INTRAMUSCULAR; INTRAVENOUS
Status: DISCONTINUED | OUTPATIENT
Start: 2020-02-24 | End: 2020-02-25 | Stop reason: HOSPADM

## 2020-02-24 RX ORDER — KETOROLAC TROMETHAMINE 30 MG/ML
INJECTION, SOLUTION INTRAMUSCULAR; INTRAVENOUS AS NEEDED
Status: DISCONTINUED | OUTPATIENT
Start: 2020-02-24 | End: 2020-02-24 | Stop reason: SURG

## 2020-02-24 RX ORDER — MIDAZOLAM HYDROCHLORIDE 1 MG/ML
1 INJECTION INTRAMUSCULAR; INTRAVENOUS
Status: DISCONTINUED | OUTPATIENT
Start: 2020-02-24 | End: 2020-02-24 | Stop reason: HOSPADM

## 2020-02-24 RX ORDER — ONDANSETRON 2 MG/ML
INJECTION INTRAMUSCULAR; INTRAVENOUS AS NEEDED
Status: DISCONTINUED | OUTPATIENT
Start: 2020-02-24 | End: 2020-02-24 | Stop reason: SURG

## 2020-02-24 RX ORDER — PROMETHAZINE HYDROCHLORIDE 25 MG/1
25 SUPPOSITORY RECTAL ONCE AS NEEDED
Status: DISCONTINUED | OUTPATIENT
Start: 2020-02-24 | End: 2020-02-25 | Stop reason: HOSPADM

## 2020-02-24 RX ORDER — PROMETHAZINE HYDROCHLORIDE 25 MG/1
25 TABLET ORAL ONCE AS NEEDED
Status: DISCONTINUED | OUTPATIENT
Start: 2020-02-24 | End: 2020-02-25 | Stop reason: HOSPADM

## 2020-02-24 RX ORDER — DIPHENHYDRAMINE HYDROCHLORIDE 50 MG/ML
12.5 INJECTION INTRAMUSCULAR; INTRAVENOUS
Status: DISCONTINUED | OUTPATIENT
Start: 2020-02-24 | End: 2020-02-25 | Stop reason: HOSPADM

## 2020-02-24 RX ORDER — FLUMAZENIL 0.1 MG/ML
0.2 INJECTION INTRAVENOUS AS NEEDED
Status: DISCONTINUED | OUTPATIENT
Start: 2020-02-24 | End: 2020-02-25 | Stop reason: HOSPADM

## 2020-02-24 RX ORDER — OXYCODONE AND ACETAMINOPHEN 7.5; 325 MG/1; MG/1
1 TABLET ORAL ONCE AS NEEDED
Status: DISCONTINUED | OUTPATIENT
Start: 2020-02-24 | End: 2020-02-25 | Stop reason: HOSPADM

## 2020-02-24 RX ORDER — PROMETHAZINE HYDROCHLORIDE 25 MG/ML
6.25 INJECTION, SOLUTION INTRAMUSCULAR; INTRAVENOUS
Status: DISCONTINUED | OUTPATIENT
Start: 2020-02-24 | End: 2020-02-25 | Stop reason: HOSPADM

## 2020-02-24 RX ORDER — MIDAZOLAM HYDROCHLORIDE 1 MG/ML
INJECTION INTRAMUSCULAR; INTRAVENOUS
Status: COMPLETED
Start: 2020-02-24 | End: 2020-02-24

## 2020-02-24 RX ORDER — MIDAZOLAM HYDROCHLORIDE 1 MG/ML
2 INJECTION INTRAMUSCULAR; INTRAVENOUS
Status: DISCONTINUED | OUTPATIENT
Start: 2020-02-24 | End: 2020-02-24 | Stop reason: HOSPADM

## 2020-02-24 RX ORDER — FENTANYL CITRATE 50 UG/ML
50 INJECTION, SOLUTION INTRAMUSCULAR; INTRAVENOUS
Status: DISCONTINUED | OUTPATIENT
Start: 2020-02-24 | End: 2020-02-24 | Stop reason: HOSPADM

## 2020-02-24 RX ORDER — CEFAZOLIN SODIUM 2 G/100ML
2 INJECTION, SOLUTION INTRAVENOUS ONCE
Status: COMPLETED | OUTPATIENT
Start: 2020-02-24 | End: 2020-02-24

## 2020-02-24 RX ORDER — HYDROCODONE BITARTRATE AND ACETAMINOPHEN 7.5; 325 MG/1; MG/1
1 TABLET ORAL ONCE AS NEEDED
Status: DISCONTINUED | OUTPATIENT
Start: 2020-02-24 | End: 2020-02-25 | Stop reason: HOSPADM

## 2020-02-24 RX ORDER — ACETAMINOPHEN 325 MG/1
650 TABLET ORAL ONCE AS NEEDED
Status: DISCONTINUED | OUTPATIENT
Start: 2020-02-24 | End: 2020-02-25 | Stop reason: HOSPADM

## 2020-02-24 RX ORDER — PROPOFOL 10 MG/ML
VIAL (ML) INTRAVENOUS AS NEEDED
Status: DISCONTINUED | OUTPATIENT
Start: 2020-02-24 | End: 2020-02-24 | Stop reason: SURG

## 2020-02-24 RX ORDER — ONDANSETRON 2 MG/ML
4 INJECTION INTRAMUSCULAR; INTRAVENOUS ONCE AS NEEDED
Status: DISCONTINUED | OUTPATIENT
Start: 2020-02-24 | End: 2020-02-25 | Stop reason: HOSPADM

## 2020-02-24 RX ORDER — HYDRALAZINE HYDROCHLORIDE 20 MG/ML
5 INJECTION INTRAMUSCULAR; INTRAVENOUS
Status: DISCONTINUED | OUTPATIENT
Start: 2020-02-24 | End: 2020-02-25 | Stop reason: HOSPADM

## 2020-02-24 RX ORDER — LIDOCAINE HYDROCHLORIDE 10 MG/ML
0.5 INJECTION, SOLUTION EPIDURAL; INFILTRATION; INTRACAUDAL; PERINEURAL ONCE AS NEEDED
Status: COMPLETED | OUTPATIENT
Start: 2020-02-24 | End: 2020-02-24

## 2020-02-24 RX ORDER — ONDANSETRON 4 MG/1
4 TABLET, FILM COATED ORAL EVERY 8 HOURS PRN
Qty: 20 TABLET | Refills: 0 | Status: SHIPPED | OUTPATIENT
Start: 2020-02-24 | End: 2020-03-03

## 2020-02-24 RX ORDER — SODIUM CHLORIDE 0.9 % (FLUSH) 0.9 %
3-10 SYRINGE (ML) INJECTION AS NEEDED
Status: DISCONTINUED | OUTPATIENT
Start: 2020-02-24 | End: 2020-02-24 | Stop reason: HOSPADM

## 2020-02-24 RX ORDER — OXYCODONE AND ACETAMINOPHEN 7.5; 325 MG/1; MG/1
TABLET ORAL
Qty: 50 TABLET | Refills: 0 | Status: SHIPPED | OUTPATIENT
Start: 2020-02-24 | End: 2020-03-18 | Stop reason: SDUPTHER

## 2020-02-24 RX ORDER — MIDAZOLAM HYDROCHLORIDE 1 MG/ML
1 INJECTION INTRAMUSCULAR; INTRAVENOUS
Status: DISCONTINUED | OUTPATIENT
Start: 2020-02-24 | End: 2020-02-25 | Stop reason: HOSPADM

## 2020-02-24 RX ORDER — ROCURONIUM BROMIDE 10 MG/ML
INJECTION, SOLUTION INTRAVENOUS AS NEEDED
Status: DISCONTINUED | OUTPATIENT
Start: 2020-02-24 | End: 2020-02-24 | Stop reason: SURG

## 2020-02-24 RX ORDER — SODIUM CHLORIDE 0.9 % (FLUSH) 0.9 %
3 SYRINGE (ML) INJECTION EVERY 12 HOURS SCHEDULED
Status: DISCONTINUED | OUTPATIENT
Start: 2020-02-24 | End: 2020-02-24 | Stop reason: HOSPADM

## 2020-02-24 RX ORDER — ACETAMINOPHEN 650 MG/1
650 SUPPOSITORY RECTAL ONCE AS NEEDED
Status: DISCONTINUED | OUTPATIENT
Start: 2020-02-24 | End: 2020-02-25 | Stop reason: HOSPADM

## 2020-02-24 RX ORDER — DIPHENHYDRAMINE HCL 25 MG
25 CAPSULE ORAL
Status: DISCONTINUED | OUTPATIENT
Start: 2020-02-24 | End: 2020-02-25 | Stop reason: HOSPADM

## 2020-02-24 RX ORDER — LABETALOL HYDROCHLORIDE 5 MG/ML
5 INJECTION, SOLUTION INTRAVENOUS
Status: DISCONTINUED | OUTPATIENT
Start: 2020-02-24 | End: 2020-02-25 | Stop reason: HOSPADM

## 2020-02-24 RX ORDER — EPHEDRINE SULFATE 50 MG/ML
5 INJECTION, SOLUTION INTRAVENOUS ONCE AS NEEDED
Status: DISCONTINUED | OUTPATIENT
Start: 2020-02-24 | End: 2020-02-25 | Stop reason: HOSPADM

## 2020-02-24 RX ORDER — BUPIVACAINE HYDROCHLORIDE AND EPINEPHRINE 5; 5 MG/ML; UG/ML
INJECTION, SOLUTION EPIDURAL; INTRACAUDAL; PERINEURAL
Status: COMPLETED | OUTPATIENT
Start: 2020-02-24 | End: 2020-02-24

## 2020-02-24 RX ORDER — GLYCOPYRROLATE 0.2 MG/ML
INJECTION INTRAMUSCULAR; INTRAVENOUS AS NEEDED
Status: DISCONTINUED | OUTPATIENT
Start: 2020-02-24 | End: 2020-02-24 | Stop reason: SURG

## 2020-02-24 RX ORDER — GABAPENTIN 300 MG/1
600 CAPSULE ORAL ONCE
Status: COMPLETED | OUTPATIENT
Start: 2020-02-24 | End: 2020-02-24

## 2020-02-24 RX ORDER — ACETAMINOPHEN 500 MG
500 TABLET ORAL ONCE
Status: COMPLETED | OUTPATIENT
Start: 2020-02-24 | End: 2020-02-24

## 2020-02-24 RX ORDER — DEXAMETHASONE SODIUM PHOSPHATE 10 MG/ML
INJECTION INTRAMUSCULAR; INTRAVENOUS AS NEEDED
Status: DISCONTINUED | OUTPATIENT
Start: 2020-02-24 | End: 2020-02-24 | Stop reason: SURG

## 2020-02-24 RX ORDER — LIDOCAINE HYDROCHLORIDE 20 MG/ML
INJECTION, SOLUTION INFILTRATION; PERINEURAL AS NEEDED
Status: DISCONTINUED | OUTPATIENT
Start: 2020-02-24 | End: 2020-02-24 | Stop reason: SURG

## 2020-02-24 RX ADMIN — KETOROLAC TROMETHAMINE 30 MG: 30 INJECTION, SOLUTION INTRAMUSCULAR; INTRAVENOUS at 09:15

## 2020-02-24 RX ADMIN — CEFAZOLIN SODIUM 2 G: 2 INJECTION, SOLUTION INTRAVENOUS at 08:01

## 2020-02-24 RX ADMIN — ROCURONIUM BROMIDE 50 MG: 10 INJECTION, SOLUTION INTRAVENOUS at 08:04

## 2020-02-24 RX ADMIN — SODIUM CHLORIDE, POTASSIUM CHLORIDE, SODIUM LACTATE AND CALCIUM CHLORIDE 9 ML/HR: 600; 310; 30; 20 INJECTION, SOLUTION INTRAVENOUS at 06:37

## 2020-02-24 RX ADMIN — BUPIVACAINE 10 ML: 13.3 INJECTION, SUSPENSION, LIPOSOMAL INFILTRATION at 07:00

## 2020-02-24 RX ADMIN — DEXAMETHASONE SODIUM PHOSPHATE 8 MG: 10 INJECTION INTRAMUSCULAR; INTRAVENOUS at 08:15

## 2020-02-24 RX ADMIN — MIDAZOLAM 1 MG: 1 INJECTION INTRAMUSCULAR; INTRAVENOUS at 10:10

## 2020-02-24 RX ADMIN — ACETAMINOPHEN 500 MG: 500 TABLET, FILM COATED ORAL at 06:32

## 2020-02-24 RX ADMIN — GLYCOPYRROLATE 0.6 MG: 0.2 INJECTION INTRAMUSCULAR; INTRAVENOUS at 09:28

## 2020-02-24 RX ADMIN — ONDANSETRON HYDROCHLORIDE 4 MG: 2 SOLUTION INTRAMUSCULAR; INTRAVENOUS at 08:15

## 2020-02-24 RX ADMIN — MIDAZOLAM HYDROCHLORIDE 1 MG: 1 INJECTION INTRAMUSCULAR; INTRAVENOUS at 10:10

## 2020-02-24 RX ADMIN — GABAPENTIN 600 MG: 300 CAPSULE ORAL at 06:22

## 2020-02-24 RX ADMIN — LIDOCAINE HYDROCHLORIDE 0.5 ML: 10 INJECTION, SOLUTION EPIDURAL; INFILTRATION; INTRACAUDAL; PERINEURAL at 06:35

## 2020-02-24 RX ADMIN — PHENYLEPHRINE HYDROCHLORIDE 150 MCG: 10 INJECTION INTRAVENOUS at 08:11

## 2020-02-24 RX ADMIN — LIDOCAINE HYDROCHLORIDE 100 MG: 20 INJECTION, SOLUTION INFILTRATION; PERINEURAL at 08:04

## 2020-02-24 RX ADMIN — BUPIVACAINE HYDROCHLORIDE AND EPINEPHRINE BITARTRATE 15 ML: 5; .0091 INJECTION, SOLUTION EPIDURAL; INTRACAUDAL; PERINEURAL at 07:00

## 2020-02-24 RX ADMIN — MIDAZOLAM 2 MG: 1 INJECTION INTRAMUSCULAR; INTRAVENOUS at 06:45

## 2020-02-24 RX ADMIN — PROPOFOL 200 MG: 10 INJECTION, EMULSION INTRAVENOUS at 08:04

## 2020-02-24 RX ADMIN — Medication 3 MG: at 09:28

## 2020-02-24 RX ADMIN — FENTANYL CITRATE 50 MCG: 50 INJECTION, SOLUTION INTRAMUSCULAR; INTRAVENOUS at 06:47

## 2020-02-24 RX ADMIN — PHENYLEPHRINE HYDROCHLORIDE 200 MCG: 10 INJECTION INTRAVENOUS at 08:22

## 2020-02-24 NOTE — OP NOTE
Rotator Cuff RepairOperative Note      Facility: [facility]  Patient Name: Christy L Verner  YOB: 1970  Date: 2/24/2020  Medical Record Number: [id number]      Pre-op Diagnosis:   Traumatic incomplete tear of left rotator cuff, subsequent encounter [S46.012D]    Post-Op Diagnosis Codes:     Complete rotator cuff tear, impingement and degenerative labral tear  Procedure(s):  Left SHOULDER ARTHROSCOPY WITH ROTATOR CUFF REPAIR, with Arthrex speed bridge and DEBRIDEMENT OF LABRUM, DECOMPRESSION    Surgeon(s):  Leisa Patel MD    Anesthesia: General with Block  Anesthesiologist: Jake Deng MD  CRNA: Leah Oreilly CRNA    Staff:   Circulator: Valdemar Castro RN  Scrub Person: Valentin Iqbal    Assistants :   None    Estimated Blood Loss: 10 cc    Specimens:   Order Name Source Comment Collection Info Order Time   PREGNANCY, URINE Urine, Clean Catch All female patient with intact uterus and not post menopausal  2/24/2020  6:14 AM       Drains: None    Findings: See Dictation    Complications: None    Indication for procedure:   This patient has had a several month history of left shoulder pain that is been unresponsive to conservative management.  They have an exam and an MRI which are consistent with rotator cuff pathology and they present for arthroscopy and possible repair.  They understand all risks benefits and alternatives.  Risk including but not exclusive to anesthetic complications including death,  MI.  CVA as well as infection bleeding DVT PE stiffness,  failure to relieve their symptoms and need for future surgery.  They understand this and agree to proceed.    Description of procedure:  Patient was taken to the operating room.  They were placed supine on the  operating room table.  After induction of adequate LMA anesthesia and scalene nerve block and IV antibiotics.  They underwent exam under anesthesia was symmetric full range of motion which was symmetric side  to side.  They were then placed in the modified beachchair position all prominent areas well padded and head well stabilized.  The arm was prepped and draped  in usual sterile fashion, bony landmarks were demarcated and the joint was infiltrated with 30 cc of fluid.  A standard posterior portal was made inferior and medial to the posterior lateral edge of the acromion with an 11 blade.  Blunt trocar penetrated into the joint scope following her evaluation began.  An anterior portal was established in the interval between the subscapularis and the biceps tendon with spinal needle localization and direct visualization. She was found have a degenerative tear of the superior aspect of the labrum that was debrided with the Apollo device and the motorized shaver.  The rotator cuff appeared pathologic.    I then exited the space, entered subacromial space. I made accessory lateral portal off the anterior lateral edge of the acromion,placed the shaver and removed thickened bursa. I delineated the anterior lateral edge of the acromion with the Opus device and removed a large spur with a bone cutter. There was plenty of room under this joint after this for the rotator cuff. The rotator cuff was evaluated and was found have a small full-thickness tear.. I did place a retention stitch with the scorpion device. I then exited the space, placed the Kobel retractors and readily identified the rotator cuff tear. The tuberosity was prepared with a rongeur.  2 Arthrex swivel lock anchors preloaded with fiber tape was placed in standard fashion with good bony pullout. A fiber tape was preloaded scorpion device was used to place each of the fiber wires one anterior one posterior. I then placed a cinch stitch anterior and posterior and incorporated this cinch stitch and 2 fiber wires in each of 2 swivel lock anchors on the lateral row.  The decompression was adequate. Everything was thoroughly irrigated. The deltoid is reapproximated  with 0 Vicryl, subcutaneous tissue with 2-0 Vicryl. The skin was closed with a running 4-0 subcuticular stitch. Sterile dressings Steri-Strips were applied. The portals were closed with 2-0 Vicryl. Sling was applied. The patient was taken to recovery room in good condition all sponge and needle count were correct.          Date: 2/24/2020  Time: 10:52 AM      will

## 2020-02-24 NOTE — NURSING NOTE
"Pt very anxious and c/o \"I can't breath.\" VS as charged. Relaxation and slow deep breathing encouraged. LS present and clear bilat. Anesthesia called to evaluate.  "

## 2020-02-24 NOTE — ANESTHESIA PROCEDURE NOTES
Peripheral Block    Pre-sedation assessment completed: 2/24/2020 6:47 AM    Patient reassessed immediately prior to procedure    Patient location during procedure: holding area  Start time: 2/24/2020 6:48 AM  Stop time: 2/24/2020 6:55 AM  Reason for block: at surgeon's request and post-op pain management  Performed by  Anesthesiologist: Jake Deng MD  Preanesthetic Checklist  Completed: patient identified, site marked, surgical consent, pre-op evaluation, timeout performed, IV checked, risks and benefits discussed and monitors and equipment checked  Prep:  Pt Position: sitting  Sterile barriers:cap, gloves and sterile barriers  Prep: ChloraPrep  Patient monitoring: blood pressure monitoring, continuous pulse oximetry and EKG  Procedure  Sedation:yes  Performed under: local infiltration  Guidance:ultrasound guided  ULTRASOUND INTERPRETATION. Using ultrasound guidance a 21 G gauge needle was placed in close proximity to the nerve, at which point, under ultrasound guidance anesthetic was injected in the area of the nerve and spread of the anesthesia was seen on ultrasound in close proximity thereto.  There were no abnormalities seen on ultrasound; a digital image was taken; and the patient tolerated the procedure with no complications. Images:still images obtained, printed/placed on chart    Laterality:left  Block Type:interscalene  Injection Technique:single-shot  Needle Type:echogenic and Tuohy  Needle Gauge:21 G  Resistance on Injection: none    Medications Used: bupivacaine liposome (EXPAREL) 1.3 % injection, 10 mL  bupivacaine-EPINEPHrine PF (MARCAINE w/EPI) 0.5% -[id number] injection, 15 mL      Post Assessment  Injection Assessment: negative aspiration for heme, no paresthesia on injection and incremental injection  Patient Tolerance:comfortable throughout block  Complications:no

## 2020-02-24 NOTE — H&P
"   History & Physical       Patient: Christy L Verner    Date of Admission: 2/24/2020  5:54 AM    YOB: 1970    Medical Record Number: [id number]    Attending Physician: Leisa Patel MD        Chief Complaints: Traumatic incomplete tear of left rotator cuff, subsequent encounter [S46.012D]      History of Present Illness: This patient has history of bilateral shoulder pain left is worse than right she has an MRI which shows a near full-thickness rotator cuff tear most likely a small full-thickness tear symptoms are activity limiting and severe she wishes to proceed with repair     Allergies: No Known Allergies    Medications:   Home Medications:  No current facility-administered medications on file prior to encounter.      Current Outpatient Medications on File Prior to Encounter   Medication Sig   • amitriptyline (ELAVIL) 100 MG tablet Take 100 mg by mouth At Night As Needed.   • gabapentin (NEURONTIN) 600 MG tablet Take 600 mg by mouth Every Night.   • methocarbamol (ROBAXIN) 500 MG tablet Take 1 tablet by mouth 4 (Four) Times a Day. (Patient taking differently: Take 500 mg by mouth 3 (Three) Times a Day As Needed.)   • traZODone (DESYREL) 100 MG tablet Take 1 tablet by mouth Every Night. (Patient taking differently: Take 50 mg by mouth Every Night.)     Current Medications:  Scheduled Meds:  bupivacaine liposome 10 mL Infiltration Once   ceFAZolin 2 g Intravenous Once   sodium chloride 3 mL Intravenous Q12H     Continuous Infusions:  lactated ringers 9 mL/hr Last Rate: 9 mL/hr (02/24/20 0637)     PRN Meds:.fentanyl  •  midazolam **OR** midazolam  •  sodium chloride    Past Medical History:   Diagnosis Date   • Anesthesia     \"SLOW TO AWAKEN\"-PT'S MOTHER TOOK 1 WEEK TO COME OUT OF ANESTHESIA   • Disease of thyroid gland    • Knee pain, left    • Left shoulder pain    • Torn rotator cuff     LEFT        Past Surgical History:   Procedure Laterality Date   • APPENDECTOMY     • BUNIONECTOMY " Bilateral    •  SECTION     • HAND SURGERY Left    • KNEE ARTHROSCOPY Left 2018    Procedure: LEFT KNEE ARTHROSCOPY, EXTENSIVE CHONDROPLASTY, PARTIAL LATERAL MENISECTOMY;  Surgeon: Moe Martínez MD;  Location: [facility];  Service: Orthopedics   • KNEE MENISCAL REPAIR Left    • SUBTOTAL HYSTERECTOMY     • TOTAL THYROIDECTOMY          Social History     Occupational History   • Not on file   Tobacco Use   • Smoking status: Former Smoker     Years: 7.00     Types: Cigarettes     Last attempt to quit: 2018     Years since quittin.1   • Smokeless tobacco: Never Used   • Tobacco comment: 3-4 CIGARETTES PER DAY   Substance and Sexual Activity   • Alcohol use: No   • Drug use: No   • Sexual activity: Defer     Birth control/protection: Surgical    Social History     Social History Narrative   • Not on file        Family History   Problem Relation Age of Onset   • Rheum arthritis Mother    • Malig Hyperthermia Neg Hx        Review of Systems      Physical Exam: 49 y.o. female  General Appearance:    Alert, cooperative, in no acute distress                    Vitals:    20 0636 20 0655   BP: 112/78 115/83   BP Location: Right arm Right arm   Patient Position: Lying Lying   Pulse: 92 100   Resp: 20 16   Temp: 97.9 °F (36.6 °C)    TempSrc: Oral    SpO2: 100% 100%        Head:    Normocephalic, without obvious abnormality, atraumatic   Eyes:            conjunctivae and sclerae normal, no pallor, corneas clear,    Ears:    Ears appear intact with no abnormalities noted   Throat:   No oral lesions, no thrush, oral mucosa moist   Neck:   No adenopathy, supple, trachea midline, no thyromegaly,    Back:     No kyphosis present, no scoliosis present, no skin lesions,      erythema or scars, no tenderness to percussion or                   palpation,   range of motion normal   Lungs:     Clear to auscultation,respirations regular, even and                  unlabored    Heart:    Regular rhythm  and normal rate               Chest Wall:    No abnormalities observed   Abdomen:     Normal bowel sounds, no masses, no organomegaly, soft        non-tender, non-distended, no guarding, no rebound                tenderness   Rectal:     Deferred   Extremities:    Moves all extremities well, no edema,   no cyanosis, no redness   Pulses:   Pulses palpable and equal bilaterally   Skin:   No bleeding, bruising or rash   Lymph nodes:   No palpable adenopathy   Neurologic:   Appears neurologic intact             Assessment:  Patient Active Problem List   Diagnosis   • Injury of left knee   • H/O thyroidectomy   • Menopause   • Complete tear of right rotator cuff   • Incomplete tear of left rotator cuff   • Traumatic incomplete tear of left rotator cuff           Plan: All risks, benefits and alternatives were discussed.  Risks including to but not exclusive to anesthetic complications, including death, MI, CVA, infection, bleeding DVT, PE,  fracture, residual pain and need for future surgery.  Patient understood all and agrees to proceed.

## 2020-02-24 NOTE — PROGRESS NOTES
In PACU pt says she can not breath.    Awake, alert and conversive.  Sats 100% on 2 L NC.  Able to verbalize, RR 20's, expressing anxiety.  BSEB without adventitious lung sounds.  Complains of sore throat.    Suspect anxiety, assured her she is OK, will try to hold off on anxiolysis if possible.

## 2020-02-24 NOTE — ANESTHESIA POSTPROCEDURE EVALUATION
Patient: Christy L Verner    Procedure Summary     Date:  02/24/20 Room / [facility]:   [facility]    Anesthesia Start:  0754 Anesthesia Stop:  0941    Procedure:  SHOULDER ARTHROSCOPY WITH ROTATOR CUFF REPAIR, DEBRIDEMENT OF LABRUM, DECOMPRESSION (Left Shoulder) Diagnosis:       Traumatic incomplete tear of left rotator cuff, subsequent encounter      (Traumatic incomplete tear of left rotator cuff, subsequent encounter [S46.012D])    Surgeon:  Leisa Patel MD Provider:  Jake Deng MD    Anesthesia Type:  general with block ASA Status:  2          Anesthesia Type: general with block    Vitals  Vitals Value Taken Time   /87 2/24/2020 10:45 AM   Temp 36.8 °C (98.3 °F) 2/24/2020 10:45 AM   Pulse 87 2/24/2020 10:45 AM   Resp 16 2/24/2020 10:45 AM   SpO2 93 % 2/24/2020 10:56 AM   Vitals shown include unvalidated device data.        Post Anesthesia Care and Evaluation    Patient location during evaluation: bedside  Patient participation: complete - patient participated  Level of consciousness: awake and alert  Pain management: adequate  Airway patency: patent  Anesthetic complications: No anesthetic complications  PONV Status: controlled  Cardiovascular status: blood pressure returned to baseline and acceptable  Respiratory status: acceptable  Hydration status: acceptable

## 2020-02-24 NOTE — ANESTHESIA PROCEDURE NOTES
Airway  Urgency: elective    Date/Time: 2/24/2020 8:06 AM  Airway not difficult    General Information and Staff    Patient location during procedure: OR  CRNA: Leah Oreilly CRNA    Indications and Patient Condition  Indications for airway management: airway protection    Preoxygenated: yes  MILS maintained throughout  Mask difficulty assessment: 1 - vent by mask    Final Airway Details  Final airway type: endotracheal airway      Successful airway: ETT  Cuffed: yes   Successful intubation technique: direct laryngoscopy  Facilitating devices/methods: intubating stylet and cricoid pressure  Endotracheal tube insertion site: oral  Blade: Gracie  Blade size: 3  ETT size (mm): 7.0  Cormack-Lehane Classification: grade I - full view of glottis  Placement verified by: chest auscultation and capnometry   Measured from: lips  ETT/EBT  to lips (cm): 21  Number of attempts at approach: 1  Assessment: lips, teeth, and gum same as pre-op and atraumatic intubation    Additional Comments  Atraumatic insertion

## 2020-02-24 NOTE — ANESTHESIA PREPROCEDURE EVALUATION
Anesthesia Evaluation     Patient summary reviewed and Nursing notes reviewed   no history of anesthetic complications:  NPO Solid Status: > 8 hours  NPO Liquid Status: > 2 hours           Airway   Mallampati: IV  TM distance: >3 FB  Neck ROM: full  Possible difficult intubation  Dental - normal exam     Pulmonary     breath sounds clear to auscultation  Cardiovascular   Exercise tolerance: good (4-7 METS)    Rhythm: regular  Rate: normal        Neuro/Psych  GI/Hepatic/Renal/Endo    (+) obesity,  GERD,      Musculoskeletal         ROS comment: Torn rotator cuff  Abdominal     Abdomen: soft.   Substance History      OB/GYN          Other                        Anesthesia Plan    ASA 2     general with block     intravenous induction     Anesthetic plan, all risks, benefits, and alternatives have been provided, discussed and informed consent has been obtained with: patient.    Plan discussed with CRNA.

## 2020-03-03 ENCOUNTER — OFFICE VISIT (OUTPATIENT)
Dept: ORTHOPEDIC SURGERY | Facility: CLINIC | Age: 50
End: 2020-03-03

## 2020-03-03 VITALS — BODY MASS INDEX: 34.66 KG/M2 | TEMPERATURE: 98.4 F | WEIGHT: 203 LBS | HEIGHT: 64 IN

## 2020-03-03 DIAGNOSIS — Z98.890 S/P ROTATOR CUFF REPAIR: Primary | ICD-10-CM

## 2020-03-03 PROCEDURE — 99024 POSTOP FOLLOW-UP VISIT: CPT | Performed by: ORTHOPAEDIC SURGERY

## 2020-03-03 NOTE — PROGRESS NOTES
Right Shoulder Scope RCR follow Up 1st Visit      Patient: Christy L Verner        YOB: 1970      Chief Complaints: right shoulder pain      History of Present Illness: Pt is here f/u shoulder arthroscopy rotator cuff repair she is really doing good she has exceeded my expectation.  I think she is tolerating the pain well she is off the pain medicine anxious to get the physical therapy        Allergies: No Known Allergies    Medications:   Home Medications:  Current Outpatient Medications on File Prior to Visit   Medication Sig   • amitriptyline (ELAVIL) 100 MG tablet Take 100 mg by mouth At Night As Needed.   • gabapentin (NEURONTIN) 600 MG tablet Take 600 mg by mouth Every Night.   • methocarbamol (ROBAXIN) 500 MG tablet Take 1 tablet by mouth 4 (Four) Times a Day. (Patient taking differently: Take 500 mg by mouth 3 (Three) Times a Day As Needed.)   • ondansetron (ZOFRAN) 4 MG tablet Take 1 tablet by mouth every 8 hours as needed for nausea or vomiting.   • oxyCODONE-acetaminophen (PERCOCET) 7.5-325 MG per tablet Take 1-2 tablets by mouth every 4 hours as needed for severe pain   • traZODone (DESYREL) 100 MG tablet Take 1 tablet by mouth Every Night. (Patient taking differently: Take 50 mg by mouth Every Night.)     No current facility-administered medications on file prior to visit.      Current Medications:  Scheduled Meds:  Continuous Infusions:  No current facility-administered medications for this visit.   PRN Meds:.          Physical Exam: 49 y.o. female  General Appearance:    Alert, cooperative, in no acute distress                 There were no vitals filed for this visit.   Patient is alert and oriented ×3 no acute distress normal mood physical exam.  Physical exam of the shoulder, incisions looked good there is no erythema,no signs or sx of infection.        Assessment  S/P shoulder scope.  I did review intraoperative findings and arthroscopic pictures with the patient.  We will remove  sutures today I showed her how to start passively  starting range of motion we will start formal physical therapy and I will see her back 4 weeks she will remain off work

## 2020-03-18 DIAGNOSIS — S46.012D TRAUMATIC INCOMPLETE TEAR OF LEFT ROTATOR CUFF, SUBSEQUENT ENCOUNTER: ICD-10-CM

## 2020-03-18 RX ORDER — OXYCODONE AND ACETAMINOPHEN 7.5; 325 MG/1; MG/1
TABLET ORAL
Qty: 50 TABLET | Refills: 0 | Status: SHIPPED | OUTPATIENT
Start: 2020-03-18 | End: 2020-04-10 | Stop reason: SDUPTHER

## 2020-03-18 NOTE — TELEPHONE ENCOUNTER
Patient is calling requesting a refill of Oxycodone 7.5/325,1-2 every 4 hrs, #50. Please advise.cld

## 2020-04-07 ENCOUNTER — TELEMEDICINE (OUTPATIENT)
Dept: ORTHOPEDIC SURGERY | Facility: CLINIC | Age: 50
End: 2020-04-07

## 2020-04-07 DIAGNOSIS — Z98.890 STATUS POST ROTATOR CUFF REPAIR: Primary | ICD-10-CM

## 2020-04-07 PROCEDURE — 99024 POSTOP FOLLOW-UP VISIT: CPT | Performed by: ORTHOPAEDIC SURGERY

## 2020-04-07 NOTE — PROGRESS NOTES
Left Shoulder rotator cuff repair follow Up      Patient: Christy L Verner        YOB: 1970    Chief Complaint   Patient presents with   • Left Shoulder follow up         History of Present Illness: Patient status post left rotator cuff repair on 2/24/2020.  This is a video visit she is currently still wearing her sling states she has been unable to get the physical therapy due to the virus.  I did inform her that several of the therapy offices are open for postoperative visits and really would like her to get into see them.  Physical Exam: 49 y.o. female  General Appearance:    Alert, cooperative, in no acute distress                 There were no vitals filed for this visit.     Patient is alert and read ×3 no acute distress appears her above-listed at height weight and age.  Affect is normal respiratory rate is normal unlabored. Heart rate regular rate rhythm, sclera, dentition and hearing are normal for the purpose of this exam.      Ortho Exam  Passive forward flexion is only to about 150 external rotation is quite limited at about 30            Assessment/Plan: Status post rotator cuff repair I think she can wean out of the sling I told her she really needs to continue to work on passive range of motion.  She is currently taking 2 pain pills a day 1 in the morning 1 at night she is followed by pain management I would ask her to call them from this point forward for refilling of her pain medicine.  I also asked her to add an anti-inflammatory in there.  I would really like her to get to physical therapy which we will try to arrange today and I would like to do another video visit in 2 weeks to check her range of motion this visit was approximately 15 minutes

## 2020-04-09 ENCOUNTER — TELEPHONE (OUTPATIENT)
Dept: ORTHOPEDIC SURGERY | Facility: CLINIC | Age: 50
End: 2020-04-09

## 2020-04-10 DIAGNOSIS — S46.012D TRAUMATIC INCOMPLETE TEAR OF LEFT ROTATOR CUFF, SUBSEQUENT ENCOUNTER: ICD-10-CM

## 2020-04-10 RX ORDER — OXYCODONE AND ACETAMINOPHEN 7.5; 325 MG/1; MG/1
TABLET ORAL
Qty: 50 TABLET | Refills: 0 | Status: SHIPPED | OUTPATIENT
Start: 2020-04-10 | End: 2020-06-15

## 2020-04-10 NOTE — TELEPHONE ENCOUNTER
Patient is calling requesting a refill of Oxycodone 7.5/325, 1-2 every 4 hrs, #50. Please advise.cld

## 2020-04-15 ENCOUNTER — HOSPITAL ENCOUNTER (OUTPATIENT)
Dept: PHYSICAL THERAPY | Facility: HOSPITAL | Age: 50
Setting detail: THERAPIES SERIES
Discharge: HOME | End: 2020-04-15

## 2020-04-15 DIAGNOSIS — Z74.09 IMPAIRED MOBILITY: ICD-10-CM

## 2020-04-15 DIAGNOSIS — Z98.890 S/P LEFT ROTATOR CUFF REPAIR: ICD-10-CM

## 2020-04-15 DIAGNOSIS — M25.512 LEFT SHOULDER PAIN, UNSPECIFIED CHRONICITY: ICD-10-CM

## 2020-04-15 DIAGNOSIS — Z47.89 ORTHOPEDIC AFTERCARE: Primary | ICD-10-CM

## 2020-04-15 PROCEDURE — 97110 THERAPEUTIC EXERCISES: CPT | Performed by: PHYSICAL THERAPIST

## 2020-04-15 PROCEDURE — 97162 PT EVAL MOD COMPLEX 30 MIN: CPT | Performed by: PHYSICAL THERAPIST

## 2020-04-15 NOTE — THERAPY EVALUATION
"    Outpatient Physical Therapy Ortho Initial Evaluation  [facility]     Patient Name: Christy L Verner  : 1970  MRN: [id number]  Today's Date: 4/15/2020      Visit Date: 04/15/2020    Patient Active Problem List   Diagnosis   • Injury of left knee   • H/O thyroidectomy   • Menopause   • Complete tear of right rotator cuff   • Incomplete tear of left rotator cuff   • Traumatic incomplete tear of left rotator cuff   • Status post rotator cuff repair        Past Medical History:   Diagnosis Date   • Anesthesia     \"SLOW TO AWAKEN\"-PT'S MOTHER TOOK 1 WEEK TO COME OUT OF ANESTHESIA   • Disease of thyroid gland    • Knee pain, left    • Left shoulder pain    • Torn rotator cuff     LEFT        Past Surgical History:   Procedure Laterality Date   • APPENDECTOMY     • BUNIONECTOMY Bilateral    •  SECTION     • HAND SURGERY Left    • KNEE ARTHROSCOPY Left 2018    Procedure: LEFT KNEE ARTHROSCOPY, EXTENSIVE CHONDROPLASTY, PARTIAL LATERAL MENISECTOMY;  Surgeon: Moe Martínez MD;  Location: [facility] OR [facility];  Service: Orthopedics   • KNEE MENISCAL REPAIR Left    • SHOULDER ARTHROSCOPY W/ ROTATOR CUFF REPAIR Left 2020    Procedure: SHOULDER ARTHROSCOPY WITH ROTATOR CUFF REPAIR, DEBRIDEMENT OF LABRUM, DECOMPRESSION;  Surgeon: Leisa Patel MD;  Location: [facility] OR [facility];  Service: Orthopedics;  Laterality: Left;   • SUBTOTAL HYSTERECTOMY     • TOTAL THYROIDECTOMY         Visit Dx:     ICD-10-CM ICD-9-CM   1. Orthopedic aftercare Z47.89 V54.9   2. Left shoulder pain, unspecified chronicity M25.512 719.41   3. S/P left rotator cuff repair Z98.890 V45.89   4. Impaired mobility Z74.09 799.89         Patient History     Row Name 04/15/20 1200             History    Chief Complaint  Difficulty with daily activities;Joint stiffness;Pain  -GJ      Type of Pain  Shoulder pain L  -GJ      Date Current Problem(s) Began  -- 2  years  -GJ      Brief Description of Current Complaint  Ms. Verner is a 48 y/o R " "handed female.  She is 7 weeks s/p L RC repair (2/24/2020).  She is a  at [employer].  She reports initial injury to L shoulder 2 years ago falling at work.  She has not had therapy since surgery.  She has been out of sling since 4/7/2020.  Sleeping ok.  Needs help with dressing/bathing.  Of note, she reports R shoulder is in need of RC repair as well, stating \"it is complete (tear)\".  She also reports having L thumb fused in 2019, leaving her LUE from hand to shoulder in pain and numb/tingling.    -GJ      Previous treatment for THIS PROBLEM  Surgery;Medication  -GJ      Surgery Date:  02/24/20  -GJ      Patient/Caregiver Goals  Relieve pain;Return to prior level of function;Return to work;Improve mobility;Improve strength;Know what to do to help the symptoms  -GJ      Hand Dominance  right-handed  -GJ      Occupation/sports/leisure activities   at [employer]  -GJ      Are you or can you be pregnant  No  -GJ         Pain     Pain Location  Shoulder L  -GJ      Pain at Present  7  -GJ      What Performance Factors Make the Current Problem(s) WORSE?  movement of LUE, sleep  -GJ         Fall Risk Assessment    Any falls in the past year:  No  -GJ         Daily Activities    Primary Language  English  -GJ      Are you able to read  Yes  -GJ      Are you able to write  Yes  -GJ      How does patient learn best?  Reading  -GJ      Teaching needs identified  Home Exercise Program;Management of Condition  -GJ      Patient is concerned about/has problems with  Difficulty with self care (i.e. bathing, dressing, toileting:;Flexibility;Grasping objects lifting;Performing home management (household chores, shopping, care of dependents);Performing job responsibilities/community activities (work, school,;Performing sports, recreation, and play activities;Reaching over head;Repetitive movements of the hand, arm, shoulder;Writing/grasping items with hand(s)  -GJ      Barriers to learning  None  -GJ      Functional Status  mobility issues " preventing performance of daily activities  -GJ      Pt Participated in POC and Goals  Yes  -GJ         Safety    Are you being hurt, hit, or frightened by anyone at home or in your life?  No  -GJ      Are you being neglected by a caregiver  No  -GJ        User Key  (r) = Recorded By, (t) = Taken By, (c) = Cosigned By    Initials Name Provider Type    Jorge Bernal, PT Physical Therapist          PT Ortho     Row Name 04/15/20 1100       Posture/Observations    Alignment Options  Forward head;Rounded shoulders;Scapular elevation  -GJ    Forward Head  Mild;Moderate  -GJ    Rounded Shoulders  Left:;Moderate  -GJ    Scapular Elevation  Left:;Elevated;Mild;Moderate  -GJ       Quarter Clearing    Quarter Clearing  Upper Quarter Clearing  -GJ       Special Tests/Palpation    Special Tests/Palpation  Shoulder  -GJ       Shoulder Girdle Palpation    Shoulder Girdle Palpation?  Yes  -GJ    Long Head of Biceps  Left:;Tender;Guarded/taut  -GJ    Short Head of Biceps  Left:;Tender;Guarded/taut  -GJ    Upper Trap  Left:;Tender;Guarded/taut  -GJ       General ROM    RT Upper Ext  Rt Shoulder ABduction;Rt Shoulder Flexion;Rt Shoulder External Rotation;Rt Shoulder Internal Rotation;Rt Elbow Extension/Flexion  -GJ    LT Upper Ext  Lt Shoulder ABduction;Lt Shoulder Flexion;Lt Shoulder External Rotation;Lt Shoulder Internal Rotation;Lt Elbow Extension/Flexion  -GJ    GENERAL ROM COMMENTS  c spine AROM grossly 75% of full in all planes, likely pt. baseline, bilateral wrist flex/ext equal and WFL, bilateral sup/pro equal and WFL  -GJ       Right Upper Ext    Rt Shoulder Abduction AROM  150 seated  -GJ    Rt Shoulder Flexion AROM  155 seated  -GJ    Rt Shoulder Internal Rotation AROM  FIR midline T 12  -GJ    Rt Elbow Extension/Flexion AROM  0-140  -GJ       Left Upper Ext    Lt Shoulder Abduction AROM  30 seated  -GJ    Lt Shoulder Flexion AROM  35 seated  -GJ    Lt Shoulder Internal Rotation AROM  FIR ipsi hip pocket  -GJ    Lt  Elbow Extension/Flexion AROM    -GJ    Lt Elbow Extension/Flexion PROM  0-140  -GJ       MMT (Manual Muscle Testing)    Rt Upper Ext  Rt Shoulder Flexion;Rt Shoulder ABduction;Rt Shoulder Internal Rotation;Rt Shoulder External Rotation  -GJ    Lt Upper Ext  Lt Shoulder Flexion;Lt Shoulder ABduction;Lt Shoulder Internal Rotation;Lt Shoulder External Rotation  -GJ       MMT Right Upper Ext    Rt Shoulder Flexion MMT, Gross Movement  (4+/5) good plus  -GJ    Rt Shoulder ABduction MMT, Gross Movement  (4+/5) good plus  -GJ    Rt Shoulder Internal Rotation MMT, Gross Movement  (4+/5) good plus  -GJ    Rt Shoulder External Rotation MMT, Gross Movement  (4+/5) good plus  -GJ       MMT Left Upper Ext    Lt Shoulder Flexion MMT, Gross Movement  -- NT secondary to procedure  -GJ    Lt Shoulder ABduction MMT, Gross Movement  -- NT secondary to procedure  -GJ    Lt Shoulder Internal Rotation MMT, Gross Movement  -- NT secondary to procedure  -GJ    Lt Shoulder External Rotation MMT, Gross Movement  -- NT secondary to procedure  -GJ       Flexibility    Flexibility Tested?  Upper Extremity  -GJ       Upper Extremity Flexibility    Upper Trapezius  Left:;Moderately limited  -GJ    Pect Minor  Left:;Mildly limited;Moderately limited  -GJ    Pect Major  Left:;Mildly limited;Moderately limited  -GJ    Latissimus Dorsi  Left:;Mildly limited;Moderately limited  -GJ      User Key  (r) = Recorded By, (t) = Taken By, (c) = Cosigned By    Initials Name Provider Type    Jorge Bernal, PT Physical Therapist                      Therapy Education  Education Details: discussed dx, px, poc, discussed anatomy of the shoulder  and physiology of healing, discussed realistic expectations and time frames for therapy, discussed activity modifications.   Given: HEP, Symptoms/condition management, Pain management, Mobility training, Posture/body mechanics  Program: New  How Provided: Verbal, Written, Demonstration  Provided to:  Patient  Level of Understanding: Teach back education performed, Verbalized, Demonstrated     PT OP Goals     Row Name 04/15/20 1100          PT Short Term Goals    STG Date to Achieve  05/15/20  -GJ     STG 1  pt. to be I with initial HEP to facilitate self management of their condition  -GJ     STG 1 Progress  New  -GJ     STG 2  pt. to be educated in/verbalize understanding of the importance of posture/ergonomics in association with their condition to facilitate self management of their condition  -GJ     STG 2 Progress  New  -GJ     STG 3  pt. to demonstrate L shoulder flexion PROM >/= 0-140 to facilitate ease of performing self care activities  -GJ     STG 3 Progress  New  -GJ     STG 4  pt to demonstrate L elbow AROM >/= 5-130 to facilitate self care activities  -GJ     STG 4 Progress  New  -GJ        Long Term Goals    LTG Date to Achieve  07/17/20  -GJ     LTG 1  pt. to be I with advanced HEP to facilitate self management of their condition  -GJ     LTG 1 Progress  New  -GJ     LTG 2  pt. to report a quick DASH </= 30 to demonstrate decreased level of perceived disability  -GJ     LTG 2 Progress  New  -GJ     LTG 3  pt. to demonstrate placing/retrieving small object from an above the shoulder level shelf with her LUE to facilitate ease of performing household/work related activities  -GJ     LTG 3 Progress  New  -GJ     LTG 4  pt. to demonstrate L shoulder flexion/abd  AROM >/= 0-140 to facilitate ease of performing functional/household activities  -GJ     LTG 4 Progress  New  -GJ     LTG 5  pt. to demonstrate L shoulder AROM FIR >/= mid line L 1to facilitate ease of performing self care/dressing activities  -GJ     LTG 5 Progress  New  -        Time Calculation    PT Goal Re-Cert Due Date  07/17/20  -       User Key  (r) = Recorded By, (t) = Taken By, (c) = Cosigned By    Initials Name Provider Type    GJ Jorge Oro, PT Physical Therapist          PT Assessment/Plan     Row Name 04/15/20 1496        "   PT Assessment    Functional Limitations  Limitations in functional capacity and performance;Limitations in community activities;Performance in self-care ADL;Performance in work activities;Performance in leisure activities;Limitation in home management  -GJ     Impairments  Range of motion;Pain;Joint mobility;Impaired postural alignment;Impaired muscle endurance;Muscle strength;Posture;Poor body mechanics;Endurance;Impaired muscle length;Impaired flexibility  -GJ     Assessment Comments  Ms. Verner is a 48 y/o R handed female.  She is 7 weeks s/p L RC repair (2/24/2020).  She is a  at [employer].  She reports initial injury to L shoulder 2 years ago falling at work.  She has not had therapy since surgery.  She has been out of sling since 4/7/2020.  Sleeping ok.  Needs help with dressing/bathing.  Of note, she reports R shoulder is in need of RC repair as well, stating \"it is complete (tear)\".  She also reports having L thumb fused in 2019, leaving her LUE from hand to shoulder in pain and numb/tingling.  Ms. Verner presents to the clinic without a sling, guarding LUE across body.  She demonstrates significant loss of A/PROM of L shoulder/elbow in all planes, with associated pain.  She demonstrates significant muscle guarding/apprehension with all movements.  She reports reports a quick DASH score of 77%, scored 0-100, 0 represents no perceived disability.  Ms. Verner demonstrates evolving s/s consistent following above procedure which limits her ability to perform self care, dressing, household, community, work activities.  Personal/complicating factors affecting recovery include but are not limited to length of time between initial injury/surgery, time from surgery to initiating therapy, co-morbidity with her L thumb/sequel from that surgery. She may benefit from skilled physical therapy intervention to address above impairments.   -GJ     Please refer to paper survey for additional self-reported information  Yes  " -GJ     Rehab Potential  Good  -GJ     Patient/caregiver participated in establishment of treatment plan and goals  Yes  -GJ     Patient would benefit from skilled therapy intervention  Yes  -GJ        PT Plan    PT Frequency  2x/week;3x/week  -GJ     Predicted Duration of Therapy Intervention (Therapy Eval)  16 visits  -GJ     Planned CPT's?  PT EVAL MOD COMPLELITY: 34496;PT RE-EVAL: 40632;PT THER ACT EA 15 MIN: 02073;PT THER PROC EA 15 MIN: 21010;PT MANUAL THERAPY EA 15 MIN: 27149;PT NEUROMUSC RE-EDUCATION EA 15 MIN: 49139;PT ELECTRICAL STIM UNATTEND: ;PT ULTRASOUND EA 15 MIN: 53818;PT HOT OR COLD PACK TREAT MCARE  -GJ     PT Plan Comments  warm up on pulley, shoulder ext/scap retraction with t band, isometrics, consider STM of periscapular tissues including UT, PROM/joint mobs  -       User Key  (r) = Recorded By, (t) = Taken By, (c) = Cosigned By    Initials Name Provider Type    GJ Jorge Oro, PT Physical Therapist            OP Exercises     Row Name 04/15/20 1219 04/15/20 1100          Total Minutes    04264 - PT Therapeutic Exercise Minutes  15  -GJ  --        Exercise 1    Exercise Name 1  --  reverse shoulder rolls  -GJ     Cueing 1  --  Verbal;Demo  -GJ     Reps 1  --  15  -GJ        Exercise 2    Exercise Name 2  --  scap retraction  -GJ     Cueing 2  --  Verbal;Demo  -GJ     Reps 2  --  15  -GJ     Time 2  --  5 s  -GJ        Exercise 3    Exercise Name 3  --  AAROM elbow flexion/ext with cane  -GJ     Cueing 3  --  Verbal;Demo  -GJ     Reps 3  --  15  -GJ     Additional Comments  --  cane  -GJ        Exercise 4    Exercise Name 4  --  AAROM flexion, supine, with self, hands clasped  -GJ     Cueing 4  --  Verbal;Demo  -GJ     Reps 4  --  15  -GJ        Exercise 5    Exercise Name 5  --  at home, squeeze hand with ball, AROM supination/pronation  -GJ     Cueing 5  --  Verbal;Demo  -GJ     Reps 5  --  15  -GJ        Exercise 6    Exercise Name 6  --  supine chest press  -GJ     Cueing 6  --   Verbal;Demo  MARY     Reps 6 --  15  -GJ     Additional Comments  --  cane  -BOBO       User Key  (r) = Recorded By, (t) = Taken By, (c) = Cosigned By    Initials Name Provider Type    Jorge Bernal, PT Physical Therapist                        Outcome Measure Options: Quick DASH(77%)         Time Calculation:     Start Time: 1100  Stop Time: 1135  Time Calculation (min): 35 min     Therapy Charges for Today     Code Description Service Date Service Provider Modifiers Qty    [id number]  PT THER PROC EA 15 MIN 4/15/2020 Jorge Oro, PT GP 1    [id number]  PT EVAL MOD COMPLEXITY 1 4/15/2020 Jorge Oro, PT GP 1          PT G-Codes  Outcome Measure Options: Quick DASH(77%)         Jorge Oro PT  4/15/2020

## 2020-04-20 ENCOUNTER — APPOINTMENT (OUTPATIENT)
Dept: PHYSICAL THERAPY | Facility: HOSPITAL | Age: 50
End: 2020-04-20

## 2020-04-24 ENCOUNTER — TELEMEDICINE (OUTPATIENT)
Dept: ORTHOPEDIC SURGERY | Facility: CLINIC | Age: 50
End: 2020-04-24

## 2020-04-24 ENCOUNTER — TELEPHONE (OUTPATIENT)
Dept: ORTHOPEDIC SURGERY | Facility: CLINIC | Age: 50
End: 2020-04-24

## 2020-04-24 DIAGNOSIS — Z98.890 S/P ROTATOR CUFF REPAIR: Primary | ICD-10-CM

## 2020-04-24 PROCEDURE — 99024 POSTOP FOLLOW-UP VISIT: CPT | Performed by: ORTHOPAEDIC SURGERY

## 2020-04-24 NOTE — TELEPHONE ENCOUNTER
Spoke to patient & scheduled her with ADRYAN on Thurs 5/14 for 3 WK PO / LEFT Shldr / SX 02/24/20.     Also scheduled patient with EMILE on 6/03 for OPNC / LOW BACK PAIN / MRI & XR (Epic) / REF YENNY TSE & ADRYAN.     Thanks /srh       ----- Message from Rolanda Millard MA sent at 4/24/2020  9:01 AM EDT -----  Thank You   ----- Message -----  From: Leisa Patel MD  Sent: 4/24/2020   8:23 AM EDT  To: Rolanda Millard MA    I need to see her in the office in 3 weeks, she also needs an appointment with Dr. Gonzales

## 2020-05-06 DIAGNOSIS — S46.012D TRAUMATIC INCOMPLETE TEAR OF LEFT ROTATOR CUFF, SUBSEQUENT ENCOUNTER: ICD-10-CM

## 2020-05-06 DIAGNOSIS — Z98.890 STATUS POST ROTATOR CUFF REPAIR: Primary | ICD-10-CM

## 2020-05-06 RX ORDER — OXYCODONE HYDROCHLORIDE AND ACETAMINOPHEN 5; 325 MG/1; MG/1
1 TABLET ORAL EVERY 6 HOURS PRN
Qty: 45 TABLET | Refills: 0 | Status: SHIPPED | OUTPATIENT
Start: 2020-05-06 | End: 2021-01-04

## 2020-05-06 RX ORDER — OXYCODONE AND ACETAMINOPHEN 7.5; 325 MG/1; MG/1
TABLET ORAL
Qty: 50 TABLET | Refills: 0 | Status: CANCELLED | OUTPATIENT
Start: 2020-05-06

## 2020-05-06 NOTE — TELEPHONE ENCOUNTER
Patient is calling requesting a refill Oxycodone 7.  5/325, 1-2 every 4 hrs, #50. Please advise.cld

## 2020-05-06 NOTE — TELEPHONE ENCOUNTER
I decreased her to Percocet 5 and she is only to take 1 every 6 hours and needs to wean off of these.  I can only prescribe him up to 3 months after surgery which would be the end of this month she needs to wean off of these over the next week or 2

## 2020-05-13 ENCOUNTER — TELEPHONE (OUTPATIENT)
Dept: ORTHOPEDIC SURGERY | Facility: CLINIC | Age: 50
End: 2020-05-13

## 2020-05-13 NOTE — TELEPHONE ENCOUNTER
Contact the patient regarding her prescreening.  Reported that she has had a cough for the last 4 days.  Talking to the patient she is afebrile and has no other symptoms.  Denies shortness of breath and has remained afebrile.  States that she gets this cough every year around this time due to possibly allergies.  Patient does have a mask and knows that she will need to wear the mask in the office at all times.  I have advised her that if she does begin to feel bad or develops a fever she needs to contact the office tomorrow to cancel her appointment and get in touch with her PCP otherwise will plan to see her tomorrow

## 2020-05-14 ENCOUNTER — OFFICE VISIT (OUTPATIENT)
Dept: ORTHOPEDIC SURGERY | Facility: CLINIC | Age: 50
End: 2020-05-14

## 2020-05-14 VITALS — WEIGHT: 211 LBS | HEIGHT: 64 IN | TEMPERATURE: 98.1 F | BODY MASS INDEX: 36.02 KG/M2

## 2020-05-14 DIAGNOSIS — Z98.890 S/P ROTATOR CUFF REPAIR: Primary | ICD-10-CM

## 2020-05-14 DIAGNOSIS — S46.011D TRAUMATIC COMPLETE TEAR OF RIGHT ROTATOR CUFF, SUBSEQUENT ENCOUNTER: ICD-10-CM

## 2020-05-14 PROCEDURE — 99212 OFFICE O/P EST SF 10 MIN: CPT | Performed by: ORTHOPAEDIC SURGERY

## 2020-05-14 NOTE — PROGRESS NOTES
Left Shoulder Follow Up/rotator cuff repair on the left and right shoulder follow-up    Patient: Christy L Verner        YOB: 1970            Chief Complaints: Left Shoulder pain and right shoulder pain      History of Present Illness: Patient is here follow left shoulder rotator cuff repair she states she is improving feels like she is getting some stronger I do not think she is ready to in any fashion go back to work.  She still having a lot of issues with her back and is going to schedule an epidural with pain management.  Her right shoulder is still bothering her.  She does have a full-thickness tear of the right rotator cuff but we need to get the left shoulder stronger before we consider fixing the right      Physical Exam: 49 y.o. female  General Appearance:    Alert, cooperative, in no acute distress                 There were no vitals filed for this visit.     Patient is alert and read ×3 no acute distress appears her above-listed at height weight and age.  Affect is normal respiratory rate is normal unlabored. Heart rate regular rate rhythm, sclera, dentition and hearing are normal for the purpose of this exam.      Ortho Exam  Physical Tyrone left shoulder she can active flex about 170 she does substitute and limp a little bit with this rotator cuff strength is 4+/5  \  Physical exam of the right shoulder reveals no overlying skin changes no lymphedema no lymphadenopathy.  Patient has active flexion 180 with mild symptoms abduction is similar external rotation is to 50 and internal rotation to the upper lumbar spine with mild symptoms.  Patient has good rotator cuff strength 4+ over 5 with isometric strength testing with pain.  Patient has a positive impingement and a positive De La Torre sign.  Patient has good cervical range of motion which is full and asymptomatic no radicular symptoms.  Patient has a normal elbow exam.  Good distal pulses are present  Patient has pain with overhead activity  and a positive Neer sign and a positive empty can sign , a positive drop arm and a definitive painful arc            Assessment/Plan:      Status post left rotator cuff repair I think she is doing okay she can get her pain medicine from pain management I think is coming for that she does her back with pain management as far as the right one goes this will need to be fixed but I am not willing to do that yet we have to get the left one better before we do that.  I will keep her off work see her back in 6 weeks

## 2020-05-19 DIAGNOSIS — M54.50 LOW BACK PAIN, UNSPECIFIED BACK PAIN LATERALITY, UNSPECIFIED CHRONICITY, UNSPECIFIED WHETHER SCIATICA PRESENT: ICD-10-CM

## 2020-05-19 RX ORDER — METHOCARBAMOL 500 MG/1
500 TABLET, FILM COATED ORAL 4 TIMES DAILY
Qty: 60 TABLET | Refills: 1 | Status: SHIPPED | OUTPATIENT
Start: 2020-05-19

## 2020-05-20 ENCOUNTER — HOSPITAL ENCOUNTER (OUTPATIENT)
Dept: PHYSICAL THERAPY | Facility: HOSPITAL | Age: 50
Setting detail: THERAPIES SERIES
Discharge: HOME | End: 2020-05-20

## 2020-05-20 DIAGNOSIS — Z74.09 IMPAIRED MOBILITY: ICD-10-CM

## 2020-05-20 DIAGNOSIS — M25.512 LEFT SHOULDER PAIN, UNSPECIFIED CHRONICITY: ICD-10-CM

## 2020-05-20 DIAGNOSIS — Z98.890 S/P LEFT ROTATOR CUFF REPAIR: ICD-10-CM

## 2020-05-20 DIAGNOSIS — Z47.89 ORTHOPEDIC AFTERCARE: Primary | ICD-10-CM

## 2020-05-20 PROCEDURE — 97164 PT RE-EVAL EST PLAN CARE: CPT | Performed by: PHYSICAL THERAPIST

## 2020-05-20 NOTE — THERAPY RE-EVALUATION
"    Outpatient Physical Therapy Ortho Re-Evaluation  [facility]     Patient Name: Christy L Verner  : 1970  MRN: [id number]  Today's Date: 2020      Visit Date: 2020    Patient Active Problem List   Diagnosis   • Injury of left knee   • H/O thyroidectomy   • Menopause   • Complete tear of right rotator cuff   • Incomplete tear of left rotator cuff   • Traumatic incomplete tear of left rotator cuff   • Status post rotator cuff repair   • S/P rotator cuff repair        Past Medical History:   Diagnosis Date   • Anesthesia     \"SLOW TO AWAKEN\"-PT'S MOTHER TOOK 1 WEEK TO COME OUT OF ANESTHESIA   • Disease of thyroid gland    • Knee pain, left    • Left shoulder pain    • Torn rotator cuff     LEFT        Past Surgical History:   Procedure Laterality Date   • APPENDECTOMY     • BUNIONECTOMY Bilateral    •  SECTION     • HAND SURGERY Left    • KNEE ARTHROSCOPY Left 2018    Procedure: LEFT KNEE ARTHROSCOPY, EXTENSIVE CHONDROPLASTY, PARTIAL LATERAL MENISECTOMY;  Surgeon: Moe Martínez MD;  [place] [facility] [place] [facility] [place]  Service: Orthopedics   • KNEE MENISCAL REPAIR Left    • SHOULDER ARTHROSCOPY W/ ROTATOR CUFF REPAIR Left 2020    Procedure: SHOULDER ARTHROSCOPY WITH ROTATOR CUFF REPAIR, DEBRIDEMENT OF LABRUM, DECOMPRESSION;  Surgeon: Leisa Patel MD;  Location: [facility] [place] [facility] [place];  Service: Orthopedics;  Laterality: Left;   • SUBTOTAL HYSTERECTOMY     • TOTAL THYROIDECTOMY         Visit Dx:     ICD-10-CM ICD-9-CM   1. Orthopedic aftercare Z47.89 V54.9   2. Left shoulder pain, unspecified chronicity M25.512 719.41   3. S/P left rotator cuff repair Z98.890 V45.89   4. Impaired mobility Z74.09 799.89             PT Ortho     Row Name 20 0900       Right Upper Ext    Rt Shoulder Abduction AROM  130 seated  -GJ    Rt Shoulder Flexion AROM  140 seated  -GJ    Rt Shoulder Internal Rotation AROM  FIR midline T 12  -GJ       Left Upper Ext    Lt Shoulder Abduction AROM  40 seated, " significant compensation  -GJ    Lt Shoulder Abduction PROM  80 supine, significant guarding  -GJ    Lt Shoulder Flexion AROM  30 seated, significant compensation   -GJ    Lt Shoulder Flexion PROM  60, supine, significant guarding (pt. pushing into extension)  -GJ    Lt Shoulder External Rotation PROM  30, supine, POS,   -GJ    Lt Shoulder Internal Rotation AROM  FIR ipsilatearl hip pocket  -GJ       MMT (Manual Muscle Testing)    Rt Upper Ext  Rt Elbow Flexion;Rt Elbow Extension  -GJ    Lt Upper Ext  Lt Elbow Extension;Lt Elbow Flexion  -GJ       MMT Right Upper Ext    Rt Elbow Flexion MMT, Gross Movement:  (4+/5) good plus  -GJ    Rt Elbow Extension MMT, Gross Movement:  (4+/5) good plus  -GJ       MMT Left Upper Ext    Lt Shoulder Flexion MMT, Gross Movement  (4-/5) good minus in available range  -GJ    Lt Shoulder ABduction MMT, Gross Movement  (4-/5) good minus in available range  -GJ    Lt Shoulder Internal Rotation MMT, Gross Movement  (4+/5) good plus  -GJ    Lt Shoulder External Rotation MMT, Gross Movement  (4+/5) good plus in available range  -GJ    Lt Elbow Flexion MMT, Gross Movement  (4+/5) good plus  -GJ    Lt Elbow Extension MMT, Gross Movement  (4+/5) good plus  -GJ      User Key  (r) = Recorded By, (t) = Taken By, (c) = Cosigned By    Initials Name Provider Type    Jorge Bernal, PT Physical Therapist                      Therapy Education  Education Details: reviewed anatomy of shoulder and physiology of healing, discussed our attendance policy and if she misses another appointment we will have to go to calling day of to obtain another appointment, discussed importance of adherence to HEP. Discussed her role in therapy.   Given: Symptoms/condition management, Pain management, HEP, Mobility training, Posture/body mechanics  Program: New, Progressed, Reinforced  How Provided: Verbal, Demonstration, Written  Provided to: Patient  Level of Understanding: Teach back education performed,  Verbalized, Demonstrated     PT OP Goals     Row Name 05/20/20 0900          PT Short Term Goals    STG Date to Achieve  06/19/20  -GJ     STG 1  pt. to be I with initial HEP to facilitate self management of their condition  -GJ     STG 1 Progress  Ongoing  -GJ     STG 1 Progress Comments  reviewed, required regular cuing  -GJ     STG 2  pt. to be educated in/verbalize understanding of the importance of posture/ergonomics in association with their condition to facilitate self management of their condition  -GJ     STG 2 Progress  Ongoing  -GJ     STG 2 Progress Comments  reviewed, see education  -GJ     STG 3  pt. to demonstrate L shoulder flexion PROM >/= 0-140 to facilitate ease of performing self care activities  -GJ     STG 3 Progress  Ongoing  -GJ     STG 3 Progress Comments  see ortho section  -GJ     STG 4  pt to demonstrate L elbow AROM >/= 5-130 to facilitate self care activities  -GJ     STG 4 Progress  Ongoing  -GJ        Long Term Goals    LTG Date to Achieve  07/17/20  -GJ     LTG 1  pt. to be I with advanced HEP to facilitate self management of their condition  -GJ     LTG 1 Progress  Ongoing  -GJ     LTG 2  pt. to report a quick DASH </= 30 to demonstrate decreased level of perceived disability  -GJ     LTG 2 Progress  Ongoing  -GJ     LTG 2 Progress Comments  54, down from inital score of 77  -GJ     LTG 3  pt. to demonstrate placing/retrieving small object from an above the shoulder level shelf with her LUE to facilitate ease of performing household/work related activities  -GJ     LTG 3 Progress  Ongoing  -GJ     LTG 4  pt. to demonstrate L shoulder flexion/abd  AROM >/= 0-140 to facilitate ease of performing functional/household activities  -GJ     LTG 4 Progress  Ongoing  -GJ     LTG 4 Progress Comments  see ortho sectoin  -GJ     LTG 5  pt. to demonstrate L shoulder AROM FIR >/= mid line L 1to facilitate ease of performing self care/dressing activities  -GJ     LTG 5 Progress  Ongoing  -      LTG 5 Progress Comments  see ortho sectoin  -GJ       User Key  (r) = Recorded By, (t) = Taken By, (c) = Cosigned By    Initials Name Provider Type    Jorge Bernal, PT Physical Therapist          PT Assessment/Plan     Row Name 05/20/20 0921          PT Assessment    Functional Limitations  Limitations in functional capacity and performance;Limitations in community activities;Performance in self-care ADL;Performance in work activities;Performance in leisure activities;Limitation in home management  -GJ     Impairments  Range of motion;Pain;Joint mobility;Impaired postural alignment;Impaired muscle endurance;Muscle strength;Posture;Poor body mechanics;Endurance;Impaired muscle length;Impaired flexibility  -GJ     Assessment Comments  Ms. Verner is a 50 y/o R handed female. She is 12 weeks s/p L RC repair (2/24/2020). She is a  at [employer]. She reports initial injury to L shoulder 2 years ago falling at work.  She has not returned to work.  She also reports that her R shoulder is in need of RC repair.  She returns to the clinic after attending one session of physical therapy on 4/15/2020, one month ago (her initial evaluation).  She demonstrates significant limitations in A/PROM and MMT of the L shoulder in all planes, demonstrates guarding with attempts of PROM.  We reviewed importance of adherence to attendance and adherence to HEP.  She reports a quick DASH score of 54%, down from 77% at initial evaluation (scored 0-100, 0 represents no perceived disability).  Ms. Verner demonstrates s/s following above procedure, with consideration of limited physical therapy/attendance, which limits her ability to participate in self care, household, work, community, and leisure activities.  She has not met any functional goals at this time.  She demonstrates limited progress toward goals at this time.  She may benefit from skilled physical therapy intervention to address the above impairments.   -GJ     Please refer to  paper survey for additional self-reported information  Yes  -GJ     Rehab Potential  Poor  -GJ     Patient/caregiver participated in establishment of treatment plan and goals  Yes  -GJ     Patient would benefit from skilled therapy intervention  Yes  -GJ        PT Plan    Predicted Duration of Therapy Intervention (Therapy Eval)  16 visits  -GJ     Planned CPT's?  PT RE-EVAL: 58818;PT THER ACT EA 15 MIN: 62530;PT THER PROC EA 15 MIN: 49950;PT MANUAL THERAPY EA 15 MIN: 44835;PT NEUROMUSC RE-EDUCATION EA 15 MIN: 59736;PT HOT OR COLD PACK TREAT MCARE;PT ELECTRICAL STIM UNATTEND: ;PT ULTRASOUND EA 15 MIN: 01159  -GJ     PT Plan Comments  warm up on UBE, pulleys, attempt banister slide, SL ER, manual rhythmic stabilization, D2 patterns, triceps, HA  -GJ       User Key  (r) = Recorded By, (t) = Taken By, (c) = Cosigned By    Initials Name Provider Type    GJ Jorge Oro, PT Physical Therapist            OP Exercises     Row Name 05/20/20 0920 05/20/20 0900          Subjective Comments    Subjective Comments  --  I was sick and had a fever and cough for a day, so I couldn't come back. I worked on my exercises at home.  I don't really want to do therapy.  I went to the doctor and i couldn't raise my arm.  I can't go back to work yet. My R shoulder needs surgery too, but not until this one is better.   -GJ        Subjective Pain    Pre-Treatment Pain Level  --  7  -GJ        Total Minutes    82042 - PT Therapeutic Exercise Minutes  15  -GJ  --        Exercise 1    Exercise Name 1  --  reverse shoulder rolls  -GJ     Cueing 1  --  Verbal;Demo  -GJ     Reps 1  --  15  -GJ     Additional Comments  --  2#  -GJ        Exercise 2    Exercise Name 2  --  scap retraction/shoulder ext  -GJ     Cueing 2  --  Verbal;Demo  -GJ     Reps 2  --  15  -GJ     Additional Comments  --  RTB  -GJ        Exercise 3    Exercise Name 3  --  biceps curls  -GJ     Cueing 3  --  Verbal;Demo  -GJ     Reps 3  --  15  -GJ     Additional Comments   --  2#  -GJ        Exercise 4    Exercise Name 4  --  AAROM flexion, supine, with self, hands clasped  -GJ     Cueing 4  --  Verbal;Demo  -GJ     Reps 4  --  15  -GJ        Exercise 5    Exercise Name 5  --  supine AAROM ER with cane  -GJ     Cueing 5  --  Verbal;Demo  -GJ     Reps 5  --  15  -GJ     Time 5  --  10  -GJ     Additional Comments  --  cane  -GJ        Exercise 6    Exercise Name 6  --  supine chest press  -GJ     Cueing 6  --  Verbal;Demo  -GJ     Reps 6  --  15  -GJ     Additional Comments  --  cane, 2#  -GJ       User Key  (r) = Recorded By, (t) = Taken By, (c) = Cosigned By    Initials Name Provider Type     Jorge Oro, PT Physical Therapist                        Outcome Measure Options: Quick DASH(54%)         Time Calculation:     Start Time: 0920  Stop Time: 0950  Time Calculation (min): 30 min     Therapy Charges for Today     Code Description Service Date Service Provider Modifiers Qty    [id number]  PT RE-EVAL ESTABLISHED PLAN 2 5/20/2020 Jorge Oro, PT 60, GP 1          PT G-Codes  Outcome Measure Options: Quick DASH(54%)         Jorge Oro, PT  5/20/2020

## 2020-05-27 ENCOUNTER — HOSPITAL ENCOUNTER (OUTPATIENT)
Dept: PHYSICAL THERAPY | Facility: HOSPITAL | Age: 50
Setting detail: THERAPIES SERIES
Discharge: HOME | End: 2020-05-27

## 2020-05-27 DIAGNOSIS — Z74.09 IMPAIRED MOBILITY: ICD-10-CM

## 2020-05-27 DIAGNOSIS — Z98.890 S/P LEFT ROTATOR CUFF REPAIR: ICD-10-CM

## 2020-05-27 DIAGNOSIS — M25.512 LEFT SHOULDER PAIN, UNSPECIFIED CHRONICITY: ICD-10-CM

## 2020-05-27 DIAGNOSIS — Z47.89 ORTHOPEDIC AFTERCARE: Primary | ICD-10-CM

## 2020-05-27 PROCEDURE — 97140 MANUAL THERAPY 1/> REGIONS: CPT | Performed by: PHYSICAL THERAPIST

## 2020-05-27 PROCEDURE — 97110 THERAPEUTIC EXERCISES: CPT | Performed by: PHYSICAL THERAPIST

## 2020-05-27 NOTE — THERAPY TREATMENT NOTE
"    Outpatient Physical Therapy Ortho Treatment Note  [facility]     Patient Name: Christy L Verner  : 1970  MRN: [id number]  Today's Date: 2020      Visit Date: 2020    Visit Dx:    ICD-10-CM ICD-9-CM   1. Orthopedic aftercare Z47.89 V54.9   2. Left shoulder pain, unspecified chronicity M25.512 719.41   3. S/P left rotator cuff repair Z98.890 V45.89   4. Impaired mobility Z74.09 799.89       Patient Active Problem List   Diagnosis   • Injury of left knee   • H/O thyroidectomy   • Menopause   • Complete tear of right rotator cuff   • Incomplete tear of left rotator cuff   • Traumatic incomplete tear of left rotator cuff   • Status post rotator cuff repair   • S/P rotator cuff repair        Past Medical History:   Diagnosis Date   • Anesthesia     \"SLOW TO AWAKEN\"-PT'S MOTHER TOOK 1 WEEK TO COME OUT OF ANESTHESIA   • Disease of thyroid gland    • Knee pain, left    • Left shoulder pain    • Torn rotator cuff     LEFT        Past Surgical History:   Procedure Laterality Date   • APPENDECTOMY     • BUNIONECTOMY Bilateral    •  SECTION     • HAND SURGERY Left    • KNEE ARTHROSCOPY Left 2018    Procedure: LEFT KNEE ARTHROSCOPY, EXTENSIVE CHONDROPLASTY, PARTIAL LATERAL MENISECTOMY;  Surgeon: Moe Martínez MD;  [place] [facility] [place] [facility] [place]  Service: [place]opedics   • KNEE MENISCAL REPAIR Left    • SHOULDER ARTHROSCOPY W/ ROTATOR CUFF REPAIR Left 2020    Procedure: SHOULDER ARTHROSCOPY WITH ROTATOR CUFF REPAIR, DEBRIDEMENT OF LABRUM, DECOMPRESSION;  Surgeon: Leisa Patel MD;  [place] [facility] [place] [facility] [place]  Service: Orthopedics;  Laterality: Left;   • SUBTOTAL HYSTERECTOMY     • TOTAL THYROIDECTOMY         PT Ortho     Row Name 20 0900       Left Upper Ext    Lt Shoulder Flexion PROM  140 AAROM flexion on pulleys  -GJ    Lt Shoulder External Rotation PROM  40 supine, pos   -GJ    Lt Elbow Extension/Flexion AROM  0-140  -GJ      User Key  (r) = Recorded By, (t) = Taken By, (c) = " Cosigned By    Initials Name Provider Type    Jorge Bernal, PT Physical Therapist                      PT Assessment/Plan     Row Name 05/27/20 0952          PT Assessment    Assessment Comments  She is 13 weeks s/p L RC repair (2/24/2020). We were able to initiate ROM/strengthening activities today.  She demonstrated significantly decreasaed guarding and improved freedom of movement wth her LUE today then previous sessions.  She demonstrates AAROM flexion of 140 on pulleys, guards with PROM.  She demonstrates full L elbow AROM.  We did not issue new exercises today to assess response to strengthening activities, but plan to add for home next session.  Ms. Verner met one goal today and is progressing toward all functional goals at this time. She remains a good candidate for skilled physical therapy.   -        PT Plan    PT Plan Comments  warm up on UBE, add wall wash, ? HA wth t band, rhythmic stabilization, D 2 patterns, issue pictures for home   -       User Key  (r) = Recorded By, (t) = Taken By, (c) = Cosigned By    Initials Name Provider Type    Jorge Bernal, PT Physical Therapist            OP Exercises     Row Name 05/27/20 0945 05/27/20 0900          Subjective Comments    Subjective Comments  --  I'm a doing. I fell down the steps over the weekend, my knee gave out. I have a gash in my leg.  Shoulder is ok   -        Subjective Pain    Pre-Treatment Pain Level  --  5  -GJ        Total Minutes    32665 - PT Therapeutic Exercise Minutes  20  -GJ  --     33651 - PT Manual Therapy Minutes  13  -GJ  --        Exercise 2    Exercise Name 2  --  scap retraction/shoulder ext  -GJ     Cueing 2  --  Verbal;Demo  -GJ     Reps 2  --  20  -GJ     Additional Comments  --  RTB  -GJ        Exercise 3    Exercise Name 3  --  biceps curls  -GJ     Cueing 3  --  Verbal;Demo  -GJ     Reps 3  --  20  -GJ     Additional Comments  --  2#  -GJ        Exercise 4    Exercise Name 4  --  AAROM flexion, supine, with  self, hands clasped  -GJ     Cueing 4  --  Verbal;Demo  -GJ     Reps 4  --  15  -GJ        Exercise 6    Exercise Name 6  --  supine chest press  -GJ     Cueing 6  --  Verbal;Demo  -GJ     Reps 6  --  15  -GJ     Additional Comments  --  cane, 2#  -GJ        Exercise 7    Exercise Name 7  --  UBE  -GJ     Time 7  --  4 min  -GJ        Exercise 8    Exercise Name 8  --  pulley  -GJ     Reps 8  --  15  -GJ        Exercise 9    Exercise Name 9  --  lat pull downs  -GJ     Cueing 9  --  Verbal;Demo  -GJ     Reps 9  --  15  -GJ     Additional Comments  --  2 plates  -GJ        Exercise 10    Exercise Name 10  --  SL ER  -GJ     Cueing 10  --  Verbal;Demo  -GJ     Sets 10  --  2  -GJ     Reps 10  --  10  -GJ     Additional Comments  --  1#  -GJ        Exercise 11    Exercise Name 11  --  supine triceps  -GJ     Cueing 11  --  Verbal;Demo  -GJ     Reps 11  --  15  -GJ     Additional Comments  --  1#  -GJ       User Key  (r) = Recorded By, (t) = Taken By, (c) = Cosigned By    Initials Name Provider Type    Jorge Bernal, PT Physical Therapist                      Manual Rx (last 36 hours)      Manual Treatments     Row Name 05/27/20 0945 05/27/20 0900          Total Minutes    76884 - PT Manual Therapy Minutes  13  -GJ  --        Manual Rx 1    Manual Rx 1 Location  --  PROM L shoulder, all planes pt supine  -GJ     Manual Rx 1 Type  --  oscillations for relaxation   -GJ       User Key  (r) = Recorded By, (t) = Taken By, (c) = Cosigned By    Initials Name Provider Type    Jorge Bernal, PT Physical Therapist          PT OP Goals     Row Name 05/27/20 0900          PT Short Term Goals    STG Date to Achieve  06/19/20  -GJ     STG 1  pt. to be I with initial HEP to facilitate self management of their condition  -GJ     STG 1 Progress  Ongoing  -GJ     STG 1 Progress Comments  reviewed/intermittent cues  -GJ     STG 2  pt. to be educated in/verbalize understanding of the importance of posture/ergonomics in  association with their condition to facilitate self management of their condition  -GJ     STG 2 Progress  Ongoing  -GJ     STG 2 Progress Comments  reviewed  -GJ     STG 3  pt. to demonstrate L shoulder flexion PROM >/= 0-140 to facilitate ease of performing self care activities  -GJ     STG 3 Progress  Partially Met  -GJ     STG 3 Progress Comments  AAROM flexion 140, PROM flexion 130 (guarded)  -GJ     STG 4  pt to demonstrate L elbow AROM >/= 5-130 to facilitate self care activities  -GJ     STG 4 Progress  Met  -GJ     STG 4 Progress Comments  0-140  -        Long Term Goals    LTG Date to Achieve  07/17/20  -GJ     LTG 1  pt. to be I with advanced HEP to facilitate self management of their condition  -GJ     LTG 1 Progress  Ongoing  -GJ     LTG 2  pt. to report a quick DASH </= 30 to demonstrate decreased level of perceived disability  -GJ     LTG 2 Progress  Ongoing  -GJ     LTG 3  pt. to demonstrate placing/retrieving small object from an above the shoulder level shelf with her LUE to facilitate ease of performing household/work related activities  -GJ     LTG 3 Progress  Ongoing  -GJ     LTG 4  pt. to demonstrate L shoulder flexion/abd  AROM >/= 0-140 to facilitate ease of performing functional/household activities  -GJ     LTG 4 Progress  Ongoing  -GJ     LTG 5  pt. to demonstrate L shoulder AROM FIR >/= mid line L 1to facilitate ease of performing self care/dressing activities  -GJ     LTG 5 Progress  Ongoing  -       User Key  (r) = Recorded By, (t) = Taken By, (c) = Cosigned By    Initials Name Provider Type    Jorge Bernal, PT Physical Therapist          Therapy Education  Education Details: nothing new for home today, will add next session if tolerates well  Given: HEP, Symptoms/condition management, Pain management, Mobility training, Posture/body mechanics  Program: New, Reinforced, Progressed  How Provided: Verbal, Demonstration  Provided to: Patient  Level of Understanding: Teach back  education performed, Demonstrated, Verbalized              Time Calculation:   Start Time: 0945  Stop Time: 1020  Time Calculation (min): 35 min  Therapy Charges for Today     Code Description Service Date Service Provider Modifiers Qty    74490225150  PT THER PROC EA 15 MIN 5/27/2020 Jorge Oro, PT GP 1    [id number]  PT MANUAL THERAPY EA 15 MIN 5/27/2020 Jorge Oro, PT GP 1                    Jorge Oro, PT  5/27/2020

## 2020-06-02 ENCOUNTER — HOSPITAL ENCOUNTER (OUTPATIENT)
Dept: PHYSICAL THERAPY | Facility: HOSPITAL | Age: 50
Setting detail: THERAPIES SERIES
Discharge: HOME | End: 2020-06-02

## 2020-06-02 DIAGNOSIS — M54.50 LOW BACK PAIN, UNSPECIFIED BACK PAIN LATERALITY, UNSPECIFIED CHRONICITY, UNSPECIFIED WHETHER SCIATICA PRESENT: ICD-10-CM

## 2020-06-02 DIAGNOSIS — Z98.890 S/P LEFT ROTATOR CUFF REPAIR: ICD-10-CM

## 2020-06-02 DIAGNOSIS — Z47.89 ORTHOPEDIC AFTERCARE: Primary | ICD-10-CM

## 2020-06-02 DIAGNOSIS — Z74.09 IMPAIRED MOBILITY: ICD-10-CM

## 2020-06-02 DIAGNOSIS — M25.512 LEFT SHOULDER PAIN, UNSPECIFIED CHRONICITY: ICD-10-CM

## 2020-06-02 PROCEDURE — 97110 THERAPEUTIC EXERCISES: CPT

## 2020-06-02 PROCEDURE — 97140 MANUAL THERAPY 1/> REGIONS: CPT

## 2020-06-02 NOTE — THERAPY TREATMENT NOTE
"    Outpatient Physical Therapy Ortho Treatment Note  [facility]     Patient Name: Christy L Verner  : 1970  MRN: [id number]  Today's Date: 2020      Visit Date: 2020    Visit Dx:    ICD-10-CM ICD-9-CM   1. Orthopedic aftercare Z47.89 V54.9   2. Left shoulder pain, unspecified chronicity M25.512 719.41   3. S/P left rotator cuff repair Z98.890 V45.89   4. Impaired mobility Z74.09 799.89       Patient Active Problem List   Diagnosis   • Injury of left knee   • H/O thyroidectomy   • Menopause   • Complete tear of right rotator cuff   • Incomplete tear of left rotator cuff   • Traumatic incomplete tear of left rotator cuff   • Status post rotator cuff repair   • S/P rotator cuff repair        Past Medical History:   Diagnosis Date   • Anesthesia     \"SLOW TO AWAKEN\"-PT'S MOTHER TOOK 1 WEEK TO COME OUT OF ANESTHESIA   • Disease of thyroid gland    • Knee pain, left    • Left shoulder pain    • Torn rotator cuff     LEFT        Past Surgical History:   Procedure Laterality Date   • APPENDECTOMY     • BUNIONECTOMY Bilateral    •  SECTION     • HAND SURGERY Left    • KNEE ARTHROSCOPY Left 2018    Procedure: LEFT KNEE ARTHROSCOPY, EXTENSIVE CHONDROPLASTY, PARTIAL LATERAL MENISECTOMY;  Surgeon: Moe Martínez MD;  Location: [facility];  Service: Orthopedics   • KNEE MENISCAL REPAIR Left    • SHOULDER ARTHROSCOPY W/ ROTATOR CUFF REPAIR Left 2020    Procedure: SHOULDER ARTHROSCOPY WITH ROTATOR CUFF REPAIR, DEBRIDEMENT OF LABRUM, DECOMPRESSION;  Surgeon: Leisa Patel MD;  Location: [facility];  Service: Orthopedics;  Laterality: Left;   • SUBTOTAL HYSTERECTOMY     • TOTAL THYROIDECTOMY         PT Ortho     Row Name 20 1000       Subjective Comments    Subjective Comments  Traffic as why late..Min c/o Sh pain.  -SI       Subjective Pain    Able to rate subjective pain?  yes  -SI      User Key  (r) = Recorded By, (t) = Taken By, (c) = Cosigned By    Initials Name Provider " Type    MAHESH Ingrid Cassidy PTA Physical Therapy Assistant                      PT Assessment/Plan     Row Name 06/02/20 1048          PT Assessment    Assessment Comments  Pt late so 30 min session.  Mild stiffness..will measure next session.  Slight upgrade to HEP for strengthex  -SI       User Key  (r) = Recorded By, (t) = Taken By, (c) = Cosigned By    Initials Name Provider Type    MAHESH HeatherjennyIngrid PTA Physical Therapy Assistant            OP Exercises     Row Name 06/02/20 1000 06/02/20 0900          Subjective Comments    Subjective Comments  Traffic as why late..Min c/o Sh pain.  -SI  --        Subjective Pain    Able to rate subjective pain?  yes  -SI  --        Total Minutes    27448 - PT Therapeutic Exercise Minutes  20  -SI  --     93066 - PT Manual Therapy Minutes  --  10  -SI        Exercise 2    Exercise Name 2  scap retraction/shoulder ext  -SI  --     Cueing 2  Verbal;Demo  -SI  --     Reps 2  20  -SI  --     Additional Comments  RTB  -SI  --        Exercise 3    Exercise Name 3  biceps curls  -SI  --     Cueing 3  Verbal;Demo  -SI  --     Reps 3  20  -SI  --     Additional Comments  2lbs  -SI  --        Exercise 4    Exercise Name 4  AAROM flexion, supine, with self, hands clasped  -SI  --     Cueing 4  Verbal;Demo  -SI  --     Reps 4  15  -SI  --        Exercise 5    Exercise Name 5  D2 flex and Horiz abd with yellow TB supine  -SI  --     Reps 5  2  -SI  --     Time 5  10  -SI  --     Additional Comments  HEP  -SI  --        Exercise 6    Exercise Name 6  supine chest press  -SI  --     Cueing 6  Verbal;Demo  -SI  --     Reps 6  15  -SI  --     Additional Comments  ----  -SI  --        Exercise 7    Exercise Name 7  UBE  -SI  --     Time 7  4 min  -SI  --        Exercise 8    Exercise Name 8  pulley  -SI  --     Reps 8  15  -SI  --        Exercise 9    Exercise Name 9  lat pull downs  -SI  --     Cueing 9  Verbal;Demo  -SI  --     Reps 9  15  -SI  --        Exercise 10    Exercise Name 10  SL  ER  -SI  --     Cueing 10  Verbal;Demo  -SI  --     Sets 10  2  -SI  --     Reps 10  10  -SI  --     Additional Comments  1lb  -SI  --        Exercise 11    Exercise Name 11  supine triceps  -SI  --     Cueing 11  Verbal;Demo  -SI  --     Reps 11  15  -SI  --        Exercise 12    Exercise Name 12  biceps standing YTB  -SI  --     Reps 12  2  -SI  --     Time 12  10  -SI  --       User Key  (r) = Recorded By, (t) = Taken By, (c) = Cosigned By    Initials Name Provider Type    Ingrid Tipton PTA Physical Therapy Assistant                      Manual Rx (last 36 hours)      Manual Treatments     Row Name 06/02/20 0900             Total Minutes    11910 - PT Manual Therapy Minutes  10  -SI         Manual Rx 1    Manual Rx 1 Location  PROM L shoulder, all planes pt supine  -SI      Manual Rx 1 Type  oscillations for relaxation   -SI      Manual Rx 1 Grade  Rhythmic stab  -SI      Manual Rx 1 Duration  10  -SI        User Key  (r) = Recorded By, (t) = Taken By, (c) = Cosigned By    Initials Name Provider Type    Ingrid Tipton PTA Physical Therapy Assistant              Therapy Education  Given: HEP, Symptoms/condition management  Program: Progressed  How Provided: Verbal, Demonstration  Provided to: Patient  Level of Understanding: Teach back education performed              Time Calculation:   Start Time: 1015  Stop Time: 1048  Time Calculation (min): 33 min  Total Timed Code Minutes- PT: 30 minute(s)  Therapy Charges for Today     Code Description Service Date Service Provider Modifiers Qty    23751327012 HC PT THER PROC EA 15 MIN 6/2/2020 Ingrid Cassidy, YESIKA GP 1    66730204072 HC PT MANUAL THERAPY EA 15 MIN 6/2/2020 Ingrid Cassidy PTA GP 1                    Ingrid Cassidy PTA  6/2/2020

## 2020-06-03 RX ORDER — TRAZODONE HYDROCHLORIDE 100 MG/1
100 TABLET ORAL NIGHTLY
Qty: 30 TABLET | Refills: 0 | Status: SHIPPED | OUTPATIENT
Start: 2020-06-03 | End: 2021-01-04

## 2020-06-04 ENCOUNTER — HOSPITAL ENCOUNTER (OUTPATIENT)
Dept: PHYSICAL THERAPY | Facility: HOSPITAL | Age: 50
Setting detail: THERAPIES SERIES
Discharge: HOME | End: 2020-06-04

## 2020-06-04 DIAGNOSIS — Z47.89 ORTHOPEDIC AFTERCARE: Primary | ICD-10-CM

## 2020-06-04 DIAGNOSIS — M25.512 LEFT SHOULDER PAIN, UNSPECIFIED CHRONICITY: ICD-10-CM

## 2020-06-04 DIAGNOSIS — Z98.890 S/P LEFT ROTATOR CUFF REPAIR: ICD-10-CM

## 2020-06-04 DIAGNOSIS — Z74.09 IMPAIRED MOBILITY: ICD-10-CM

## 2020-06-04 PROCEDURE — 97140 MANUAL THERAPY 1/> REGIONS: CPT

## 2020-06-04 PROCEDURE — 97110 THERAPEUTIC EXERCISES: CPT

## 2020-06-04 NOTE — THERAPY TREATMENT NOTE
"    Outpatient Physical Therapy Ortho Treatment Note  [facility]     Patient Name: Christy L Verner  : 1970  MRN: [id number]  Today's Date: 2020      Visit Date: 2020    Visit Dx:    ICD-10-CM ICD-9-CM   1. Orthopedic aftercare Z47.89 V54.9   2. Left shoulder pain, unspecified chronicity M25.512 719.41   3. S/P left rotator cuff repair Z98.890 V45.89   4. Impaired mobility Z74.09 799.89       Patient Active Problem List   Diagnosis   • Injury of left knee   • H/O thyroidectomy   • Menopause   • Complete tear of right rotator cuff   • Incomplete tear of left rotator cuff   • Traumatic incomplete tear of left rotator cuff   • Status post rotator cuff repair   • S/P rotator cuff repair        Past Medical History:   Diagnosis Date   • Anesthesia     \"SLOW TO AWAKEN\"-PT'S MOTHER TOOK 1 WEEK TO COME OUT OF ANESTHESIA   • Disease of thyroid gland    • Knee pain, left    • Left shoulder pain    • Torn rotator cuff     LEFT        Past Surgical History:   Procedure Laterality Date   • APPENDECTOMY     • BUNIONECTOMY Bilateral    •  SECTION     • HAND SURGERY Left    • KNEE ARTHROSCOPY Left 2018    Procedure: LEFT KNEE ARTHROSCOPY, EXTENSIVE CHONDROPLASTY, PARTIAL LATERAL MENISECTOMY;  Surgeon: Moe Martínez MD;  Location: [facility];  Service: Orthopedics   • KNEE MENISCAL REPAIR Left    • SHOULDER ARTHROSCOPY W/ ROTATOR CUFF REPAIR Left 2020    Procedure: SHOULDER ARTHROSCOPY WITH ROTATOR CUFF REPAIR, DEBRIDEMENT OF LABRUM, DECOMPRESSION;  Surgeon: Leisa Patel MD;  Location: [facility];  Service: Orthopedics;  Laterality: Left;   • SUBTOTAL HYSTERECTOMY     • TOTAL THYROIDECTOMY         PT Ortho     Row Name 20 0900       Subjective Comments    Subjective Comments  Reports compliance with HEP  -SI       Subjective Pain    Able to rate subjective pain?  yes  -SI    Subjective Pain Comment  5 left sh pain when have it...  -SI       Right Upper Ext    Rt Shoulder " Abduction AROM  90  -SI    Rt Shoulder Abduction PROM  125  -SI    Rt Shoulder Flexion AROM  125  -SI    Rt Shoulder Flexion PROM  160  -SI    Rt Shoulder External Rotation AROM  C7  -SI    Rt Shoulder External Rotation PROM  68  -SI    Rt Shoulder Internal Rotation AROM  ipsilateral hip  -SI    Rt Shoulder Internal Rotation PROM  70  -SI    Rt Upper Extremity Comments   sub-acute pain end ranges with PROM   -SI      User Key  (r) = Recorded By, (t) = Taken By, (c) = Cosigned By    Initials Name Provider Type    Ingrid Tipton PTA Physical Therapy Assistant                      PT Assessment/Plan     Row Name 06/04/20 1006          PT Assessment    Assessment Comments  Appears to be compliant with home instructions.  Early fatigue with RC strengthening ex...  -SI       User Key  (r) = Recorded By, (t) = Taken By, (c) = Cosigned By    Initials Name Provider Type    Ingrid Tipton PTA Physical Therapy Assistant            OP Exercises     Row Name 06/04/20 0900             Subjective Comments    Subjective Comments  Reports compliance with HEP  -SI         Subjective Pain    Able to rate subjective pain?  yes  -SI      Subjective Pain Comment  5 left sh pain when have it...  -SI         Total Minutes    55307 - PT Therapeutic Exercise Minutes  30  -SI      66195 - PT Manual Therapy Minutes  15  -SI         Exercise 2    Exercise Name 2  scap retraction/shoulder ext  -SI      Cueing 2  Verbal;Demo  -SI      Reps 2  20  -SI      Additional Comments  RTB  -SI         Exercise 3    Exercise Name 3  biceps curls  -SI      Cueing 3  Verbal;Demo  -SI      Reps 3  20  -SI         Exercise 4    Exercise Name 4  AAROM flexion, supine, with self, hands clasped  -SI      Cueing 4  Verbal;Demo  -SI      Reps 4  15  -SI         Exercise 5    Exercise Name 5  D2 flex and Horiz abd with yellow TB supine  -SI      Reps 5  2  -SI      Time 5  10  -SI         Exercise 6    Exercise Name 6  supine chest press  -SI      Cueing 6   Verbal;Demo  -SI      Reps 6  15  -SI         Exercise 7    Exercise Name 7  UBE  -SI      Time 7  4 min  -SI         Exercise 8    Exercise Name 8  pulley  -SI      Reps 8  15  -SI         Exercise 9    Exercise Name 9  lat pull downs  -SI      Cueing 9  Verbal;Demo  -SI      Reps 9  15  -SI      Additional Comments  -----------  -SI         Exercise 10    Exercise Name 10  SL ER  -SI      Cueing 10  Verbal;Demo  -SI      Sets 10  2  -SI      Reps 10  10  -SI      Additional Comments  1lb an effort for 2 sets  -SI         Exercise 11    Exercise Name 11  supine triceps  -SI      Cueing 11  Verbal;Demo  -SI      Reps 11  15  -SI         Exercise 12    Exercise Name 12  biceps standing YTB  -SI      Reps 12  2  -SI      Time 12  10  -SI         Exercise 13    Exercise Name 13  IR and ER with YTB  -SI      Sets 13  2  -SI      Reps 13  0  -SI      Additional Comments  small range  -SI         Exercise 14    Exercise Name 14  standing self assist IR  -SI      Sets 14  1  -SI      Reps 14  5  -SI      Time 14  10  -SI      Additional Comments  painful and to go easy  -SI        User Key  (r) = Recorded By, (t) = Taken By, (c) = Cosigned By    Initials Name Provider Type    Ingrid Tipton PTA Physical Therapy Assistant                      Manual Rx (last 36 hours)      Manual Treatments     Row Name 06/04/20 0900             Total Minutes    18960 - PT Manual Therapy Minutes  15  -SI         Manual Rx 1    Manual Rx 1 Location  PROM L shoulder, all planes pt supine  -SI      Manual Rx 1 Type  oscillations for relaxation   -SI      Manual Rx 1 Grade  Rhythmic stab  -SI      Manual Rx 1 Duration  15  -SI        User Key  (r) = Recorded By, (t) = Taken By, (c) = Cosigned By    Initials Name Provider Type    Ingrid Tipton PTA Physical Therapy Assistant          PT OP Goals     Row Name 06/04/20 0100          PT Short Term Goals    STG Date to Achieve  06/19/20  -SI     STG 1  pt. to be I with initial HEP to  facilitate self management of their condition  -SI     STG 1 Progress  Met  -SI     STG 2  pt. to be educated in/verbalize understanding of the importance of posture/ergonomics in association with their condition to facilitate self management of their condition  -SI     STG 2 Progress  Ongoing  -SI     STG 3  pt. to demonstrate L shoulder flexion PROM >/= 0-140 to facilitate ease of performing self care activities  -SI     STG 3 Progress  Met  -SI     STG 4  pt to demonstrate L elbow AROM >/= 5-130 to facilitate self care activities  -SI     STG 4 Progress  Met  -SI        Long Term Goals    LTG Date to Achieve  07/17/20  -SI     LTG 1  pt. to be I with advanced HEP to facilitate self management of their condition  -SI     LTG 1 Progress  Ongoing  -SI     LTG 2  pt. to report a quick DASH </= 30 to demonstrate decreased level of perceived disability  -SI     LTG 2 Progress  Ongoing  -SI     LTG 3  pt. to demonstrate placing/retrieving small object from an above the shoulder level shelf with her LUE to facilitate ease of performing household/work related activities  -SI     LTG 3 Progress  Ongoing  -SI     LTG 4  pt. to demonstrate L shoulder flexion/abd  AROM >/= 0-140 to facilitate ease of performing functional/household activities  -SI     LTG 4 Progress  Ongoing  -SI     LTG 5  pt. to demonstrate L shoulder AROM FIR >/= mid line L 1to facilitate ease of performing self care/dressing activities  -SI     LTG 5 Progress  Ongoing  -SI       User Key  (r) = Recorded By, (t) = Taken By, (c) = Cosigned By    Initials Name Provider Type    Ingrid Tipton, PTA Physical Therapy Assistant          Therapy Education  Given: HEP, Symptoms/condition management(daily ROM and every other day steength)  Program: Progressed  How Provided: Verbal, Demonstration, Written  Provided to: Patient  Level of Understanding: Teach back education performed              Time Calculation:   Start Time: 0915  Stop Time: 1000  Time  Calculation (min): 45 min  Total Timed Code Minutes- PT: 45 minute(s)  Therapy Charges for Today     Code Description Service Date Service Provider Modifiers Qty    17708274147 HC PT THER PROC EA 15 MIN 6/4/2020 Ingrid Cassidy, YESIKA GP 2    79286540180 HC PT MANUAL THERAPY EA 15 MIN 6/4/2020 Ingrid Cassidy PTA GP 1                    Ingrid Cassidy PTA  6/4/2020

## 2020-06-09 ENCOUNTER — APPOINTMENT (OUTPATIENT)
Dept: PHYSICAL THERAPY | Facility: HOSPITAL | Age: 50
End: 2020-06-09

## 2020-06-09 ENCOUNTER — TELEPHONE (OUTPATIENT)
Dept: ORTHOPEDIC SURGERY | Facility: CLINIC | Age: 50
End: 2020-06-09

## 2020-06-11 ENCOUNTER — HOSPITAL ENCOUNTER (OUTPATIENT)
Dept: PHYSICAL THERAPY | Facility: HOSPITAL | Age: 50
Setting detail: THERAPIES SERIES
Discharge: HOME | End: 2020-06-11

## 2020-06-11 DIAGNOSIS — Z47.89 ORTHOPEDIC AFTERCARE: Primary | ICD-10-CM

## 2020-06-11 DIAGNOSIS — M25.512 LEFT SHOULDER PAIN, UNSPECIFIED CHRONICITY: ICD-10-CM

## 2020-06-11 DIAGNOSIS — Z98.890 S/P LEFT ROTATOR CUFF REPAIR: ICD-10-CM

## 2020-06-11 DIAGNOSIS — Z74.09 IMPAIRED MOBILITY: ICD-10-CM

## 2020-06-11 PROCEDURE — 97110 THERAPEUTIC EXERCISES: CPT

## 2020-06-11 PROCEDURE — 97140 MANUAL THERAPY 1/> REGIONS: CPT

## 2020-06-11 NOTE — THERAPY TREATMENT NOTE
"    Outpatient Physical Therapy Ortho Treatment Note  [facility]     Patient Name: Christy L Verner  : 1970  MRN: [id number]  Today's Date: 2020      Visit Date: 2020    Visit Dx:    ICD-10-CM ICD-9-CM   1. Orthopedic aftercare Z47.89 V54.9   2. Left shoulder pain, unspecified chronicity M25.512 719.41   3. S/P left rotator cuff repair Z98.890 V45.89   4. Impaired mobility Z74.09 799.89       Patient Active Problem List   Diagnosis   • Injury of left knee   • H/O thyroidectomy   • Menopause   • Complete tear of right rotator cuff   • Incomplete tear of left rotator cuff   • Traumatic incomplete tear of left rotator cuff   • Status post rotator cuff repair   • S/P rotator cuff repair        Past Medical History:   Diagnosis Date   • Anesthesia     \"SLOW TO AWAKEN\"-PT'S MOTHER TOOK 1 WEEK TO COME OUT OF ANESTHESIA   • Disease of thyroid gland    • Knee pain, left    • Left shoulder pain    • Torn rotator cuff     LEFT        Past Surgical History:   Procedure Laterality Date   • APPENDECTOMY     • BUNIONECTOMY Bilateral    •  SECTION     • HAND SURGERY Left    • KNEE ARTHROSCOPY Left 2018    Procedure: LEFT KNEE ARTHROSCOPY, EXTENSIVE CHONDROPLASTY, PARTIAL LATERAL MENISECTOMY;  Surgeon: Moe Martínez MD;  Location: [facility];  Service: Orthopedics   • KNEE MENISCAL REPAIR Left    • SHOULDER ARTHROSCOPY W/ ROTATOR CUFF REPAIR Left 2020    Procedure: SHOULDER ARTHROSCOPY WITH ROTATOR CUFF REPAIR, DEBRIDEMENT OF LABRUM, DECOMPRESSION;  Surgeon: Leisa Patel MD;  Location: [facility];  Service: Orthopedics;  Laterality: Left;   • SUBTOTAL HYSTERECTOMY     • TOTAL THYROIDECTOMY         PT Ortho     Row Name 20 0900       Subjective Comments    Subjective Comments  Pt states she feels ready to get back to work (FMLA runs out 6-17).  Pain in mornings left shoulder  -SI       Subjective Pain    Able to rate subjective pain?  yes  -SI    Subjective Pain Comment  " pain 6 in AM, 3 with ADL's.  -SI       Right Upper Ext    Rt Shoulder Abduction AROM  160  -SI    Rt Shoulder Flexion AROM  160  -SI    Rt Shoulder External Rotation AROM  C7  -SI    Rt Shoulder Internal Rotation AROM  T10  -SI       Left Upper Ext    Lt Shoulder Abduction AROM  140  -SI    Lt Shoulder Abduction PROM  140  -SI    Lt Shoulder Flexion AROM  140  -SI    Lt Shoulder Flexion PROM  160  -SI    Lt Shoulder External Rotation AROM  C7  -SI    Lt Shoulder External Rotation PROM  75  -SI    Lt Shoulder Internal Rotation AROM  T112  -SI    Lt Shoulder Internal Rotation PROM  70  -SI       MMT Left Upper Ext    Lt Shoulder Flexion MMT, Gross Movement  (4-/5) good minus  -SI    Lt Shoulder ABduction MMT, Gross Movement  (4-/5) good minus  -SI    Lt Shoulder Internal Rotation MMT, Gross Movement  (4+/5) good plus  -SI    Lt Shoulder External Rotation MMT, Gross Movement  (4/5) good  -SI    Lt Elbow Flexion MMT, Gross Movement  (4+/5) good plus  -SI    Lt Elbow Extension MMT, Gross Movement  (4+/5) good plus  -SI      User Key  (r) = Recorded By, (t) = Taken By, (c) = Cosigned By    Initials Name Provider Type    Ingrid Tipton PTA Physical Therapy Assistant                      PT Assessment/Plan     Row Name 06/11/20 1001          PT Assessment    Assessment Comments  Pt is 3 1/2 months post-op.  Pain, ROM,, and strength all  improving epecially over the passt two weeks as pt compliant with strengthening ex.   She feels the need to get back to work soon.  Pt RTMD nest weekk for re-assessment.  -SI       User Key  (r) = Recorded By, (t) = Taken By, (c) = Cosigned By    Initials Name Provider Type    Ingrid Tipton PTA Physical Therapy Assistant            OP Exercises     Row Name 06/11/20 0900             Subjective Comments    Subjective Comments  Pt states she feels ready to get back to work (FMLA runs out 6-17).  Pain in mornings left shoulder  -SI         Subjective Pain    Able to rate subjective  pain?  yes  -SI      Subjective Pain Comment  pain 6 in AM, 3 with ADL's.  -SI         Total Minutes    98975 - PT Therapeutic Exercise Minutes  30  -SI      48203 - PT Manual Therapy Minutes  15  -SI         Exercise 2    Exercise Name 2  scap retraction/shoulder ext  -SI      Cueing 2  Verbal;Demo  -SI      Reps 2  20  -SI      Additional Comments  RTB  -SI         Exercise 3    Exercise Name 3  biceps curls  -SI      Cueing 3  Verbal;Demo  -SI      Reps 3  20  -SI         Exercise 4    Exercise Name 4  AAROM flexion, supine, with self, hands clasped  -SI      Cueing 4  Verbal;Demo  -SI      Reps 4  15  -SI         Exercise 5    Exercise Name 5  D2 flex and Horiz abd with yellow TB supine  -SI      Reps 5  2  -SI      Time 5  10  -SI      Additional Comments  supine YTB  -SI         Exercise 7    Exercise Name 7  UBE  -SI      Time 7  4 min  -SI         Exercise 8    Exercise Name 8  pulley  -SI      Reps 8  15  -SI         Exercise 9    Exercise Name 9  lat pull downs  -SI      Cueing 9  Verbal;Demo  -SI      Reps 9  15  -SI         Exercise 10    Exercise Name 10  SL ER  -SI      Cueing 10  Verbal;Demo  -SI      Sets 10  2  -SI      Reps 10  10  -SI         Exercise 11    Exercise Name 11  supine triceps  -SI      Cueing 11  Verbal;Demo  -SI      Reps 11  15  -SI         Exercise 12    Exercise Name 12  biceps standing YTB  -SI      Reps 12  2  -SI      Time 12  10  -SI      Additional Comments  stand on YTB  -SI         Exercise 13    Exercise Name 13  IR and ER with YTB  -SI      Sets 13  2  -SI      Reps 13  0  -SI      Additional Comments  small range  -SI         Exercise 14    Exercise Name 14  standing self assist IR  -SI      Sets 14  1  -SI      Reps 14  5  -SI      Time 14  10  -SI        User Key  (r) = Recorded By, (t) = Taken By, (c) = Cosigned By    Initials Name Provider Type    SI Ingrid Cassidy, PTA Physical Therapy Assistant                      Manual Rx (last 36 hours)      Manual Treatments      Row Name 06/11/20 0900             Total Minutes    95019 - PT Manual Therapy Minutes  15  -SI         Manual Rx 1    Manual Rx 1 Location  PROM L shoulder, all planes pt supine  -SI      Manual Rx 1 Type  oscillations for relaxation   -SI      Manual Rx 1 Duration  15  -SI        User Key  (r) = Recorded By, (t) = Taken By, (c) = Cosigned By    Initials Name Provider Type    Ingrid Tipton, PTA Physical Therapy Assistant          PT OP Goals     Row Name 06/11/20 1000          PT Short Term Goals    STG Date to Achieve  06/19/20  -SI     STG 1  pt. to be I with initial HEP to facilitate self management of their condition  -SI     STG 1 Progress  Met  -SI     STG 2  pt. to be educated in/verbalize understanding of the importance of posture/ergonomics in association with their condition to facilitate self management of their condition  -SI     STG 2 Progress  Ongoing  -SI     STG 3  pt. to demonstrate L shoulder flexion PROM >/= 0-140 to facilitate ease of performing self care activities  -SI     STG 3 Progress  Met  -SI     STG 4  pt to demonstrate L elbow AROM >/= 5-130 to facilitate self care activities  -SI     STG 4 Progress  Met  -SI        Long Term Goals    LTG Date to Achieve  07/17/20  -SI     LTG 1  pt. to be I with advanced HEP to facilitate self management of their condition  -SI     LTG 1 Progress  Ongoing  -SI     LTG 2  pt. to report a quick DASH </= 30 to demonstrate decreased level of perceived disability  -SI     LTG 2 Progress  Ongoing  -SI     LTG 3  pt. to demonstrate placing/retrieving small object from an above the shoulder level shelf with her LUE to facilitate ease of performing household/work related activities  -SI     LTG 3 Progress  Ongoing  -SI     LTG 4  pt. to demonstrate L shoulder flexion/abd  AROM >/= 0-140 to facilitate ease of performing functional/household activities  -SI     LTG 4 Progress  Met  -SI     LTG 5  pt. to demonstrate L shoulder AROM FIR >/= mid line L 1to  facilitate ease of performing self care/dressing activities  -SI     LTG 5 Progress  Progressing  -SI       User Key  (r) = Recorded By, (t) = Taken By, (c) = Cosigned By    Initials Name Provider Type    Ingrid Tipton PTA Physical Therapy Assistant          Therapy Education  Given: Posture/body mechanics([facility] and demmo for lifting)  Program: Progressed  How Provided: Verbal, Demonstration, Written  Provided to: Patient  Level of Understanding: Teach back education performed              Time Calculation:   Start Time: 0915  Stop Time: 1004  Time Calculation (min): 49 min  Total Timed Code Minutes- PT: 45 minute(s)  Therapy Charges for Today     Code Description Service Date Service Provider Modifiers Qty    65072666213 HC PT THER PROC EA 15 MIN 6/11/2020 Ingrid Cassidy PTA GP 2    82894499919 HC PT MANUAL THERAPY EA 15 MIN 6/11/2020 Ingrid Cassidy PTA GP 1                    Ingrid Cassidy PTA  6/11/2020

## 2020-06-15 ENCOUNTER — OFFICE VISIT (OUTPATIENT)
Dept: ORTHOPEDIC SURGERY | Facility: CLINIC | Age: 50
End: 2020-06-15

## 2020-06-15 VITALS — WEIGHT: 215.6 LBS | HEIGHT: 64 IN | BODY MASS INDEX: 36.81 KG/M2 | TEMPERATURE: 98.6 F

## 2020-06-15 DIAGNOSIS — Z98.890 S/P ROTATOR CUFF REPAIR: Primary | ICD-10-CM

## 2020-06-15 PROCEDURE — 99212 OFFICE O/P EST SF 10 MIN: CPT | Performed by: ORTHOPAEDIC SURGERY

## 2020-06-15 NOTE — PROGRESS NOTES
Left Shoulder Follow Up/rotator cuff repair left shoulder follow-up and follow-upof right shoulder      Patient: Christy L Verner        YOB: 1970            Chief Complaints: Left Shoulder pain, right shoulder pain      History of Present Illness: Patient is here follow-up of left shoulder rotator cuff repair she has a known rotator cuff tear on the right as well she states the left is doing well therapy is helping she is progressing her activities she would like to get back to work and feels like she can do it with some limitations right shoulder is holding its own she is able to do most things she wants to do    Physical Exam: 49 y.o. female  General Appearance:    Alert, cooperative, in no acute distress                 There were no vitals filed for this visit.     Patient is alert and read ×3 no acute distress appears her above-listed at height weight and age.  Affect is normal respiratory rate is normal unlabored. Heart rate regular rate rhythm, sclera, dentition and hearing are normal for the purpose of this exam.      Ortho Exam  Physical exam of the left shoulder reveals no overlying skin changes no lymphedema no lymphadenopathy.  Patient has active flexion 170 with mild symptoms abduction is similar external rotation is to 50 and internal rotation to the upper lumbar spine with mild symptoms.  Patient has good rotator cuff strength 4+ over 5 with isometric strength testing with pain.  Patient has a positive impingement and a positive De La Torre sign.  Patient has good cervical range of motion which is full and asymptomatic no radicular symptoms.  Patient has a normal elbow exam.  Good distal pulses are presentPatient has pain with overhead activity and a positive Neer sign and a positive empty can sign  They have a positive drop arm any definitive painful arc      Physical exam of the right shoulder reveals no overlying skin changes no lymphedema no lymphadenopathy.  Patient has active flexion  180 with mild symptoms abduction is similar external rotation is to 50 and internal rotation to the upper lumbar spine with mild symptoms.  Patient has good rotator cuff strength 4+ over 5 with isometric strength testing with pain.  Patient has a positive impingement and a positive De La Torre sign.  Patient has good cervical range of motion which is full and asymptomatic no radicular symptoms.  Patient has a normal elbow exam.  Good distal pulses are present  Patient has pain with overhead activity and a positive Neer sign and a positive empty can sign , a positive drop arm and a definitive painful arc      Assessment/Plan:    Status post left rotator cuff repair I think she is doing well starting to get back to work with limitations of no overhead lifting and pounds limitations on lifting and pushing and pulling as far as the right one goes she wants to hold off on getting this fixed he was thinking either end of this year or first of next year

## 2020-06-16 ENCOUNTER — TELEPHONE (OUTPATIENT)
Dept: PHYSICAL THERAPY | Facility: HOSPITAL | Age: 50
End: 2020-06-16

## 2020-06-16 NOTE — TELEPHONE ENCOUNTER
Talked with pt. Re: missed appt today.  She reports MD released her to return to work.  Will let us know tomorrow if she will continue therapy or not.

## 2020-06-23 ENCOUNTER — TELEPHONE (OUTPATIENT)
Dept: PHYSICAL THERAPY | Facility: HOSPITAL | Age: 50
End: 2020-06-23

## 2020-06-23 NOTE — TELEPHONE ENCOUNTER
LM with pt today re: missed visit this morning in outpatient PT. Reminded pt of upcoming appt on 6/25/2020 at 09:15, and asked that if she did not want to keep this appt to please call so I can fill her spot.

## 2020-06-25 ENCOUNTER — TELEPHONE (OUTPATIENT)
Dept: PHYSICAL THERAPY | Facility: HOSPITAL | Age: 50
End: 2020-06-25

## 2020-06-25 NOTE — TELEPHONE ENCOUNTER
Ms. Verner did not show up for today's physical therapy appointment.  Attempts have been made to reach her re: appts/missed visits.  At this time, we will hold on further PT appointments, or work on day of scheduling secondary to number of missed visits.

## 2020-07-21 ENCOUNTER — OFFICE VISIT (OUTPATIENT)
Dept: ORTHOPEDIC SURGERY | Facility: CLINIC | Age: 50
End: 2020-07-21

## 2020-07-21 VITALS — HEIGHT: 64 IN | TEMPERATURE: 97.5 F | BODY MASS INDEX: 36.7 KG/M2 | WEIGHT: 215 LBS

## 2020-07-21 DIAGNOSIS — Z98.890 S/P ROTATOR CUFF REPAIR: Primary | ICD-10-CM

## 2020-07-21 DIAGNOSIS — S46.011D TRAUMATIC COMPLETE TEAR OF RIGHT ROTATOR CUFF, SUBSEQUENT ENCOUNTER: ICD-10-CM

## 2020-07-21 PROCEDURE — 99213 OFFICE O/P EST LOW 20 MIN: CPT | Performed by: ORTHOPAEDIC SURGERY

## 2020-07-21 RX ORDER — MELOXICAM 15 MG/1
15 TABLET ORAL DAILY
Qty: 30 TABLET | Refills: 0 | OUTPATIENT
Start: 2020-07-21 | End: 2020-10-02

## 2020-07-21 NOTE — PROGRESS NOTES
Left Shoulder Scope RCR follow Up       Patient: Christy L Verner        YOB: 1970      Chief Complaints:left  shoulder pain      History of Present Illness: Pt is here f/u shoulder arthroscopy, RCR she states the left one is still bothering her but it is better the right one is still bothering her.  She does have a known rotator cuff tear on the right her plan is to get this fixed this year I still would like her to wait a little bit longer get the left shoulder a little better.  She is able to work but is working on a restricted duty        Allergies: No Known Allergies    Medications:   Home Medications:  Current Outpatient Medications on File Prior to Visit   Medication Sig   • amitriptyline (ELAVIL) 100 MG tablet Take 100 mg by mouth At Night As Needed.   • gabapentin (NEURONTIN) 600 MG tablet Take 600 mg by mouth Every Night.   • methocarbamol (ROBAXIN) 500 MG tablet Take 1 tablet by mouth 4 (Four) Times a Day.   • oxyCODONE (ROXICODONE) 5 MG immediate release tablet Take 1 tablet by mouth twice a day as needed   • oxyCODONE-acetaminophen (PERCOCET) 5-325 MG per tablet Take 1 tablet by mouth Every 6 (Six) Hours As Needed For severe pain.   • traZODone (DESYREL) 100 MG tablet Take 1 tablet by mouth Every Night. PLEASE CALL THE OFFICE FOR AN APPT   • traZODone (DESYREL) 50 MG tablet Take 1-2 tablets by mouth daily     No current facility-administered medications on file prior to visit.      Current Medications:  Scheduled Meds:  Continuous Infusions:  No current facility-administered medications for this visit.   PRN Meds:.          Physical Exam: 49 y.o. female  General Appearance:    Alert, cooperative, in no acute distress                 There were no vitals filed for this visit.   Patient is alert and oriented ×3 no acute distress normal mood physical exam.  Physical exam of the shoulder, incisions looked good there is no erythema,no signs or sx of infection.  Physical exam of the left  shoulder reveals no overlying skin changes no lymphedema no lymphadenopathy.  Patient has active flexion 170 with mild symptoms abduction is similar external rotation is to 50 and internal rotation to the upper lumbar spine with mild symptoms.  Patient has good rotator cuff strength 4+ over 5 with isometric strength testing with pain.  Patient has a positive impingement and a positive De La Torre sign.  Patient has good cervical range of motion which is full and asymptomatic no radicular symptoms.  Patient has a normal elbow exam.  Good distal pulses are presentPatient has pain with overhead activity and a positive Neer sign and a positive empty can sign  They have a positive drop arm any definitive painful arc    Physical exam of the right shoulder reveals no overlying skin changes no lymphedema no lymphadenopathy.  Patient has active flexion 180 with mild symptoms abduction is similar external rotation is to 50 and internal rotation to the upper lumbar spine with mild symptoms.  Patient has good rotator cuff strength 4+ over 5 with isometric strength testing with pain.  Patient has a positive impingement and a positive De La Torre sign.  Patient has good cervical range of motion which is full and asymptomatic no radicular symptoms.  Patient has a normal elbow exam.  Good distal pulses are present  Patient has pain with overhead activity and a positive Neer sign and a positive empty can sign , a positive drop arm and a definitive painful arc      Assessment  S/P shoulder scope, rotator cuff repair, on the left I think she is doing well and like her to get that last little bit of motion shoulder had a stretch this on the right she has a known rotator cuff tear plan is to proceed with repair she is thinking about doing this in the fall towards the end of the year I will see her back in 6 weeks we will have her continue on her restricted duty

## 2020-08-24 ENCOUNTER — OFFICE VISIT (OUTPATIENT)
Dept: ORTHOPEDIC SURGERY | Facility: CLINIC | Age: 50
End: 2020-08-24

## 2020-08-24 VITALS — TEMPERATURE: 97.9 F | WEIGHT: 209.2 LBS | HEIGHT: 64 IN | BODY MASS INDEX: 35.71 KG/M2

## 2020-08-24 DIAGNOSIS — R52 PAIN: Primary | ICD-10-CM

## 2020-08-24 DIAGNOSIS — M25.562 CHRONIC PAIN OF LEFT KNEE: ICD-10-CM

## 2020-08-24 DIAGNOSIS — G89.29 CHRONIC PAIN OF LEFT KNEE: ICD-10-CM

## 2020-08-24 PROCEDURE — 73562 X-RAY EXAM OF KNEE 3: CPT | Performed by: ORTHOPAEDIC SURGERY

## 2020-08-24 PROCEDURE — 20610 DRAIN/INJ JOINT/BURSA W/O US: CPT | Performed by: ORTHOPAEDIC SURGERY

## 2020-08-24 PROCEDURE — 99213 OFFICE O/P EST LOW 20 MIN: CPT | Performed by: ORTHOPAEDIC SURGERY

## 2020-08-24 RX ADMIN — METHYLPREDNISOLONE ACETATE 80 MG: 80 INJECTION, SUSPENSION INTRA-ARTICULAR; INTRALESIONAL; INTRAMUSCULAR; SOFT TISSUE at 15:38

## 2020-08-24 NOTE — PROGRESS NOTES
New Left Knee      Patient: Christy L Verner        YOB: 1970    Medical Record Number: [id number]        Chief Complaints: Left knee pain      History of Present Illness: This is a 49-year-old female who have seen the past for shoulder who presents complaining of left knee pain she states is been hurting for at least 2 years she had it scoped 2 years ago by Dr. Moe Martínez she states it is been hurting every since primarily anterior but also medial she does have swelling she has night pain symptoms are severe intermittent stabbing aching burning clicking worse with standing better with ice she is a cook at [employer] past medical history is more for thyroid disease        Allergies: No Known Allergies    Medications:   Home Medications:  Current Outpatient Medications on File Prior to Visit   Medication Sig   • amitriptyline (ELAVIL) 100 MG tablet Take 100 mg by mouth At Night As Needed.   • gabapentin (NEURONTIN) 600 MG tablet Take 600 mg by mouth Every Night.   • meloxicam (MOBIC) 15 MG tablet Take 1 tablet by mouth Daily with food.   • methocarbamol (ROBAXIN) 500 MG tablet Take 1 tablet by mouth 4 (Four) Times a Day.   • oxyCODONE-acetaminophen (PERCOCET) 5-325 MG per tablet Take 1 tablet by mouth Every 6 (Six) Hours As Needed For severe pain.   • traZODone (DESYREL) 50 MG tablet Take 1-2 tablets by mouth daily   • oxyCODONE (ROXICODONE) 5 MG immediate release tablet Take 1 tablet by mouth twice a day as needed   • oxyCODONE-acetaminophen (PERCOCET) 5-325 MG per tablet Take 1 tablet by mouth 2 (Two) Times a Day As Needed.   • traZODone (DESYREL) 100 MG tablet Take 1 tablet by mouth Every Night. PLEASE CALL THE OFFICE FOR AN APPT     No current facility-administered medications on file prior to visit.      Current Medications:  Scheduled Meds:  Continuous Infusions:  No current facility-administered medications for this visit.   PRN Meds:.    Past Medical History:   Diagnosis Date   • Anesthesia      "\"SLOW TO AWAKEN\"-PT'S MOTHER TOOK 1 WEEK TO COME OUT OF ANESTHESIA   • Disease of thyroid gland    • Knee pain, left    • Left shoulder pain    • Torn rotator cuff     LEFT        Past Surgical History:   Procedure Laterality Date   • APPENDECTOMY     • BUNIONECTOMY Bilateral    •  SECTION     • HAND SURGERY Left    • KNEE ARTHROSCOPY Left 2018    Procedure: LEFT KNEE ARTHROSCOPY, EXTENSIVE CHONDROPLASTY, PARTIAL LATERAL MENISECTOMY;  Surgeon: Moe Martínez MD;  Location: [facility];  Service: Orthopedics   • KNEE MENISCAL REPAIR Left    • SHOULDER ARTHROSCOPY W/ ROTATOR CUFF REPAIR Left 2020    Procedure: SHOULDER ARTHROSCOPY WITH ROTATOR CUFF REPAIR, DEBRIDEMENT OF LABRUM, DECOMPRESSION;  Surgeon: Leisa Patel MD;  Location: [facility];  Service: Orthopedics;  Laterality: Left;   • SUBTOTAL HYSTERECTOMY     • TOTAL THYROIDECTOMY          Social History     Occupational History   • Not on file   Tobacco Use   • Smoking status: Former Smoker     Years: 7.00     Types: Cigarettes     Last attempt to quit: 2018     Years since quittin.6   • Smokeless tobacco: Never Used   • Tobacco comment: 3-4 CIGARETTES PER DAY   Substance and Sexual Activity   • Alcohol use: No   • Drug use: No   • Sexual activity: Defer     Birth control/protection: Surgical      Social History     Social History Narrative   • Not on file        Family History   Problem Relation Age of Onset   • Rheum arthritis Mother    • Malig Hyperthermia Neg Hx              Review of Systems: 14 point review of systems are remarkable for the pertinent positives listed in the chart by the patient remainder negative    Review of Systems      Physical Exam: 49 y.o. female  General Appearance:    Alert, cooperative, in no acute distress                   Vitals:    20 1529   Temp: 97.9 °F (36.6 °C)   Weight: 94.9 kg (209 lb 3.2 oz)   Height: 162.6 cm (64\")   PainSc: 10-Worst pain ever      Patient is alert and read ×3 " no acute distress appears her above-listed at height weight and age.  Affect is normal respiratory rate is normal unlabored. Heart rate regular rate rhythm, sclera, dentition and hearing are normal for the purpose of this exam.        Ortho Exam  Physical exam of the left knee reveals no effusion, no erythema.  The patient has no palpable tenderness along the medial joint line, no tenderness about the lateral joint line.  Patient does have crepitus with patellofemoral range of motion.  They also have subjective symptoms anteriorly during exam.  The patient has a negative bounce home, negative Joey and a stable ligamentous exam.  Quad tone is reasonable and symmetric.  There are no overlying skin changes no lymphedema no lymphadenopathy.  There is good hip range of motion which is full symmetric and asymptomatic and a normal ankle exam.  Hamstrings and IT band are tight bilaterally.    Large Joint Arthrocentesis: L knee  Date/Time: 8/24/2020 3:38 PM  Consent given by: patient  Site marked: site marked  Timeout: Immediately prior to procedure a time out was called to verify the correct patient, procedure, equipment, support staff and site/side marked as required   Supporting Documentation  Indications: pain   Procedure Details  Location: knee - L knee  Preparation: Patient was prepped and draped in the usual sterile fashion  Needle size: 22 G  Approach: anteromedial  Medications administered: 4 mL lidocaine (cardiac); 80 mg methylPREDNISolone acetate 80 MG/ML  Patient tolerance: patient tolerated the procedure well with no immediate complications                   Radiology:   AP, Lateral and merchant views of the left knee  were ordered/reviewed to evauateknee pain.  Have no comparative films she has mild lateral and patellofemoral OA otherwise no acute bony pathology is appreciated  Imaging Results (Most Recent)     Procedure Component Value Units Date/Time    XR Knee 3 View Left [[id number]] Resulted:  08/24/20  1523     Updated:  08/24/20 1523    Impression:       Ordering physician's impression is located in the Encounter Note dated 08/24/20. X-ray performed in the DR room.          Assessment/Plan:      Left knee pain I think this is degenerative meniscal pathology has to remain in the differential I would treat this conservatively first with injections and strengthening if she fails to improve we will pursue other means of testing

## 2020-08-25 RX ORDER — METHYLPREDNISOLONE ACETATE 80 MG/ML
80 INJECTION, SUSPENSION INTRA-ARTICULAR; INTRALESIONAL; INTRAMUSCULAR; SOFT TISSUE
Status: COMPLETED | OUTPATIENT
Start: 2020-08-24 | End: 2020-08-24

## 2020-08-28 ENCOUNTER — TELEPHONE (OUTPATIENT)
Dept: FAMILY MEDICINE CLINIC | Facility: CLINIC | Age: 50
End: 2020-08-28

## 2020-08-28 DIAGNOSIS — R05.9 COUGH: ICD-10-CM

## 2020-08-28 DIAGNOSIS — R43.2 LOSS OF TASTE: Primary | ICD-10-CM

## 2020-09-18 ENCOUNTER — OFFICE VISIT (OUTPATIENT)
Dept: FAMILY MEDICINE CLINIC | Facility: CLINIC | Age: 50
End: 2020-09-18

## 2020-09-18 DIAGNOSIS — U07.1 COVID-19 VIRUS INFECTION: Primary | ICD-10-CM

## 2020-09-18 PROCEDURE — 99442 PR PHYS/QHP TELEPHONE EVALUATION 11-20 MIN: CPT | Performed by: NURSE PRACTITIONER

## 2020-09-18 NOTE — PROGRESS NOTES
Subjective   Christy L Verner is a 50 y.o. female. COVID telephone FU    History of Present Illness   Diagnosed from 8/28/2020 COVID test. initial test negative on 8/10, though was symptomatic. Couldn't taste or smell by the 28th so retested, symptom onset actually 8/10/2020. Works at hospital. Nasal congestion, scratchy throat initially. Was initally tested at  and felt good technique, was swabbed both nostrils. Never really ran fever. Voice was really weak. Vocal cords are still getting weak, chest hurts when coughs. Cough is improved. 7-8 x daily now. Still taste perversion, but taste was totally gone at onset. Thinks only 50% better now. Has returned to work. Felt rushed back to work. Went back to work on the 14th--and worried still infected as not well. Does not want to risk others.  Custody of grandkids, they did good job with isolation. They have no symptoms  The following portions of the patient's history were reviewed and updated as appropriate: allergies, current medications, past family history, past medical history, past social history, past surgical history and problem list.    Review of Systems   Constitutional: Positive for activity change, appetite change and fatigue. Negative for chills and fever.   HENT: Positive for congestion, rhinorrhea, sore throat and voice change.         Loss of taste and smell   Eyes: Negative.    Respiratory: Positive for cough. Negative for shortness of breath.    Cardiovascular: Negative.    Gastrointestinal: Positive for nausea. Negative for vomiting.   Musculoskeletal: Negative.    Skin: Negative.    Neurological: Positive for weakness.   Hematological: Negative.    Psychiatric/Behavioral: Negative.      There were no vitals taken for this visit.    Objective   Physical Exam  Constitutional:       General: She is not in acute distress.     Appearance: She is well-developed. She is not diaphoretic.   HENT:      Mouth/Throat:      Comments: Hoarse voice comes and  goes  Pulmonary:      Effort: Pulmonary effort is normal. Tachypnea present.   Skin:     General: Skin is dry.   Neurological:      Mental Status: She is alert and oriented to person, place, and time.   Psychiatric:         Behavior: Behavior normal.         Thought Content: Thought content normal.         Judgment: Judgment normal.       Assessment/Plan   Problems Addressed this Visit     None      Visit Diagnoses     COVID-19 virus infection    -  Primary    Relevant Orders    COVID-19,[facility] ROUTINE, NP/OP SWAB IN TRANSPORT MEDIA OR ESWAB 72 HR TAT - Swab, Nasopharynx (Completed)        COVID--really feeling weak since return to work and worried about spreading if not resolved. Ok for repeat COVID testing    You have chosen to receive care through a telephone visit. Do you consent to use a telephone visit for your medical care today? Yes  12 min EM

## 2020-09-22 ENCOUNTER — TELEPHONE (OUTPATIENT)
Dept: FAMILY MEDICINE CLINIC | Facility: CLINIC | Age: 50
End: 2020-09-22

## 2020-10-02 ENCOUNTER — APPOINTMENT (OUTPATIENT)
Dept: GENERAL RADIOLOGY | Facility: HOSPITAL | Age: 50
End: 2020-10-02

## 2020-10-02 ENCOUNTER — APPOINTMENT (OUTPATIENT)
Dept: CT IMAGING | Facility: HOSPITAL | Age: 50
End: 2020-10-02

## 2020-10-02 ENCOUNTER — HOSPITAL ENCOUNTER (EMERGENCY)
Facility: HOSPITAL | Age: 50
Discharge: HOME | End: 2020-10-02
Attending: EMERGENCY MEDICINE | Admitting: EMERGENCY MEDICINE

## 2020-10-02 ENCOUNTER — TELEPHONE (OUTPATIENT)
Dept: FAMILY MEDICINE CLINIC | Facility: CLINIC | Age: 50
End: 2020-10-02

## 2020-10-02 VITALS
SYSTOLIC BLOOD PRESSURE: 129 MMHG | HEIGHT: 64 IN | HEART RATE: 91 BPM | TEMPERATURE: 97.4 F | OXYGEN SATURATION: 99 % | RESPIRATION RATE: 18 BRPM | WEIGHT: 215 LBS | BODY MASS INDEX: 36.7 KG/M2 | DIASTOLIC BLOOD PRESSURE: 68 MMHG

## 2020-10-02 DIAGNOSIS — R10.13 EPIGASTRIC PAIN: Primary | ICD-10-CM

## 2020-10-02 LAB
ALBUMIN SERPL-MCNC: 4.3 G/DL (ref 3.5–5.2)
ALBUMIN/GLOB SERPL: 1.3 G/DL
ALP SERPL-CCNC: 69 U/L (ref 39–117)
ALT SERPL W P-5'-P-CCNC: 12 U/L (ref 1–33)
ANION GAP SERPL CALCULATED.3IONS-SCNC: 7.8 MMOL/L (ref 5–15)
AST SERPL-CCNC: 16 U/L (ref 1–32)
BASOPHILS # BLD AUTO: 0.02 10*3/MM3 (ref 0–0.2)
BASOPHILS NFR BLD AUTO: 0.3 % (ref 0–1.5)
BILIRUB SERPL-MCNC: 0.4 MG/DL (ref 0–1.2)
BILIRUB UR QL STRIP: NEGATIVE
BUN SERPL-MCNC: 9 MG/DL (ref 6–20)
BUN/CREAT SERPL: 14.1 (ref 7–25)
CALCIUM SPEC-SCNC: 9.4 MG/DL (ref 8.6–10.5)
CHLORIDE SERPL-SCNC: 101 MMOL/L (ref 98–107)
CLARITY UR: CLEAR
CO2 SERPL-SCNC: 31.2 MMOL/L (ref 22–29)
COLOR UR: YELLOW
CREAT SERPL-MCNC: 0.64 MG/DL (ref 0.57–1)
DEPRECATED RDW RBC AUTO: 44.1 FL (ref 37–54)
EOSINOPHIL # BLD AUTO: 0.07 10*3/MM3 (ref 0–0.4)
EOSINOPHIL NFR BLD AUTO: 1.2 % (ref 0.3–6.2)
ERYTHROCYTE [DISTWIDTH] IN BLOOD BY AUTOMATED COUNT: 13.6 % (ref 12.3–15.4)
GFR SERPL CREATININE-BSD FRML MDRD: 120 ML/MIN/1.73
GLOBULIN UR ELPH-MCNC: 3.3 GM/DL
GLUCOSE SERPL-MCNC: 92 MG/DL (ref 65–99)
GLUCOSE UR STRIP-MCNC: NEGATIVE MG/DL
HCT VFR BLD AUTO: 35.6 % (ref 34–46.6)
HGB BLD-MCNC: 11.5 G/DL (ref 12–15.9)
HGB UR QL STRIP.AUTO: NEGATIVE
HOLD SPECIMEN: NORMAL
HOLD SPECIMEN: NORMAL
IMM GRANULOCYTES # BLD AUTO: 0.02 10*3/MM3 (ref 0–0.05)
IMM GRANULOCYTES NFR BLD AUTO: 0.3 % (ref 0–0.5)
KETONES UR QL STRIP: NEGATIVE
LEUKOCYTE ESTERASE UR QL STRIP.AUTO: NEGATIVE
LIPASE SERPL-CCNC: 12 U/L (ref 13–60)
LYMPHOCYTES # BLD AUTO: 2.37 10*3/MM3 (ref 0.7–3.1)
LYMPHOCYTES NFR BLD AUTO: 41.1 % (ref 19.6–45.3)
MCH RBC QN AUTO: 28.4 PG (ref 26.6–33)
MCHC RBC AUTO-ENTMCNC: 32.3 G/DL (ref 31.5–35.7)
MCV RBC AUTO: 87.9 FL (ref 79–97)
MONOCYTES # BLD AUTO: 0.48 10*3/MM3 (ref 0.1–0.9)
MONOCYTES NFR BLD AUTO: 8.3 % (ref 5–12)
NEUTROPHILS NFR BLD AUTO: 2.81 10*3/MM3 (ref 1.7–7)
NEUTROPHILS NFR BLD AUTO: 48.8 % (ref 42.7–76)
NITRITE UR QL STRIP: NEGATIVE
NRBC BLD AUTO-RTO: 0 /100 WBC (ref 0–0.2)
PH UR STRIP.AUTO: <=5 [PH] (ref 5–8)
PLATELET # BLD AUTO: 220 10*3/MM3 (ref 140–450)
PMV BLD AUTO: 10.8 FL (ref 6–12)
POTASSIUM SERPL-SCNC: 3.6 MMOL/L (ref 3.5–5.2)
PROT SERPL-MCNC: 7.6 G/DL (ref 6–8.5)
PROT UR QL STRIP: NEGATIVE
RBC # BLD AUTO: 4.05 10*6/MM3 (ref 3.77–5.28)
SODIUM SERPL-SCNC: 140 MMOL/L (ref 136–145)
SP GR UR STRIP: 1.02 (ref 1–1.03)
TROPONIN T SERPL-MCNC: <0.01 NG/ML (ref 0–0.03)
UROBILINOGEN UR QL STRIP: NORMAL
WBC # BLD AUTO: 5.77 10*3/MM3 (ref 3.4–10.8)
WHOLE BLOOD HOLD SPECIMEN: NORMAL
WHOLE BLOOD HOLD SPECIMEN: NORMAL

## 2020-10-02 PROCEDURE — 81003 URINALYSIS AUTO W/O SCOPE: CPT | Performed by: PHYSICIAN ASSISTANT

## 2020-10-02 PROCEDURE — 84484 ASSAY OF TROPONIN QUANT: CPT | Performed by: EMERGENCY MEDICINE

## 2020-10-02 PROCEDURE — 96375 TX/PRO/DX INJ NEW DRUG ADDON: CPT

## 2020-10-02 PROCEDURE — 25010000002 IOPAMIDOL 61 % SOLUTION: Performed by: EMERGENCY MEDICINE

## 2020-10-02 PROCEDURE — 25010000002 ONDANSETRON PER 1 MG: Performed by: PHYSICIAN ASSISTANT

## 2020-10-02 PROCEDURE — 74177 CT ABD & PELVIS W/CONTRAST: CPT

## 2020-10-02 PROCEDURE — 99284 EMERGENCY DEPT VISIT MOD MDM: CPT

## 2020-10-02 PROCEDURE — 93010 ELECTROCARDIOGRAM REPORT: CPT | Performed by: INTERNAL MEDICINE

## 2020-10-02 PROCEDURE — 85025 COMPLETE CBC W/AUTO DIFF WBC: CPT | Performed by: EMERGENCY MEDICINE

## 2020-10-02 PROCEDURE — 96374 THER/PROPH/DIAG INJ IV PUSH: CPT

## 2020-10-02 PROCEDURE — 93005 ELECTROCARDIOGRAM TRACING: CPT | Performed by: PHYSICIAN ASSISTANT

## 2020-10-02 PROCEDURE — 80053 COMPREHEN METABOLIC PANEL: CPT | Performed by: EMERGENCY MEDICINE

## 2020-10-02 PROCEDURE — 71045 X-RAY EXAM CHEST 1 VIEW: CPT

## 2020-10-02 PROCEDURE — 25010000002 HYDROMORPHONE PER 4 MG: Performed by: EMERGENCY MEDICINE

## 2020-10-02 PROCEDURE — 83690 ASSAY OF LIPASE: CPT | Performed by: EMERGENCY MEDICINE

## 2020-10-02 RX ORDER — ALUMINA, MAGNESIA, AND SIMETHICONE 2400; 2400; 240 MG/30ML; MG/30ML; MG/30ML
15 SUSPENSION ORAL ONCE
Status: COMPLETED | OUTPATIENT
Start: 2020-10-02 | End: 2020-10-02

## 2020-10-02 RX ORDER — PANTOPRAZOLE SODIUM 40 MG/1
40 TABLET, DELAYED RELEASE ORAL DAILY
Qty: 14 TABLET | Refills: 0 | Status: SHIPPED | OUTPATIENT
Start: 2020-10-02 | End: 2020-10-26

## 2020-10-02 RX ORDER — LIDOCAINE HYDROCHLORIDE 20 MG/ML
15 SOLUTION OROPHARYNGEAL ONCE
Status: COMPLETED | OUTPATIENT
Start: 2020-10-02 | End: 2020-10-02

## 2020-10-02 RX ORDER — DICYCLOMINE HCL 20 MG
20 TABLET ORAL EVERY 6 HOURS
Qty: 24 TABLET | Refills: 0 | Status: SHIPPED | OUTPATIENT
Start: 2020-10-02 | End: 2021-02-22

## 2020-10-02 RX ORDER — SUCRALFATE 1 G/1
1 TABLET ORAL 4 TIMES DAILY
Qty: 28 TABLET | Refills: 0 | Status: SHIPPED | OUTPATIENT
Start: 2020-10-02 | End: 2020-11-19

## 2020-10-02 RX ORDER — SODIUM CHLORIDE 0.9 % (FLUSH) 0.9 %
10 SYRINGE (ML) INJECTION AS NEEDED
Status: DISCONTINUED | OUTPATIENT
Start: 2020-10-02 | End: 2020-10-02 | Stop reason: HOSPADM

## 2020-10-02 RX ORDER — ONDANSETRON 2 MG/ML
4 INJECTION INTRAMUSCULAR; INTRAVENOUS ONCE
Status: COMPLETED | OUTPATIENT
Start: 2020-10-02 | End: 2020-10-02

## 2020-10-02 RX ORDER — FAMOTIDINE 10 MG/ML
20 INJECTION, SOLUTION INTRAVENOUS ONCE
Status: COMPLETED | OUTPATIENT
Start: 2020-10-02 | End: 2020-10-02

## 2020-10-02 RX ORDER — HYDROMORPHONE HYDROCHLORIDE 1 MG/ML
0.5 INJECTION, SOLUTION INTRAMUSCULAR; INTRAVENOUS; SUBCUTANEOUS ONCE
Status: COMPLETED | OUTPATIENT
Start: 2020-10-02 | End: 2020-10-02

## 2020-10-02 RX ADMIN — LIDOCAINE HYDROCHLORIDE 15 ML: 20 SOLUTION ORAL; TOPICAL at 18:32

## 2020-10-02 RX ADMIN — ALUMINUM HYDROXIDE, MAGNESIUM HYDROXIDE, AND DIMETHICONE 15 ML: 400; 400; 40 SUSPENSION ORAL at 18:32

## 2020-10-02 RX ADMIN — FAMOTIDINE 20 MG: 10 INJECTION INTRAVENOUS at 15:39

## 2020-10-02 RX ADMIN — IOPAMIDOL 85 ML: 612 INJECTION, SOLUTION INTRAVENOUS at 17:14

## 2020-10-02 RX ADMIN — HYDROMORPHONE HYDROCHLORIDE 0.5 MG: 1 INJECTION, SOLUTION INTRAMUSCULAR; INTRAVENOUS; SUBCUTANEOUS at 15:39

## 2020-10-02 RX ADMIN — SODIUM CHLORIDE 1000 ML: 9 INJECTION, SOLUTION INTRAVENOUS at 15:35

## 2020-10-02 RX ADMIN — ONDANSETRON 4 MG: 2 INJECTION INTRAMUSCULAR; INTRAVENOUS at 15:36

## 2020-10-02 NOTE — ED NOTES
Per MIRIAM Shankar, pt is not going to be r/o for covid; he reports pt does not need isolation.     Mariann Cortes, RN  10/02/20 [id number]

## 2020-10-02 NOTE — ED NOTES
Pt verbalizes the understanding of dc instructions. Pt left in Crystal Delatorre, RN  10/02/20 1926

## 2020-10-02 NOTE — ED PROVIDER NOTES
I have supervised the care provided by the midlevel provider.    We have discussed this patient's history, physical exam, and treatment plan.   I have reviewed the note and have personally examined the patient and agree with the plan of care.  See attached attending note.  My personal findings are below:    Patient complains of epigastric pain, lower chest pain, nausea for approximately 1 week.  Pain is described as sharp and burning.  Patient denies fever, vomiting, shortness of breath, or diarrhea.    On exam: Awake and alert.  Heart is regular rate and rhythm.  Lungs are clear bilaterally.  There is mild epigastric tenderness without rebound or guarding.  No calf tenderness or pedal edema.    Plan is to obtain labs, chest x-ray, CT abdomen/pelvis, and EKG.    EKG          EKG time: 1532  Rhythm/Rate: Sinus rhythm with PVCs, rate 75  P waves and TN: Normal  QRS, axis: Inferior Q waves, normal axis  ST and T waves: Nonspecific T wave changes anteriorly    Interpreted Contemporaneously by me, independently viewed  EKG is not significantly changed compared to prior EKG done 9/21/2018       Michael Singh MD  10/02/20 [id number]

## 2020-10-02 NOTE — ED TRIAGE NOTES
Pt reports cough, non productive. Pt having chest pain with cough. Pt also reports abd. Pain, nausea, increased weakness and generalized headache started 1 week ago. Pt tested positive for COVID 8/28/2020 and continued to have symptoms pt tested negative 9/18/2020. Mask placed on patient in first look triage. Triage staff wearing masks.

## 2020-10-02 NOTE — DISCHARGE INSTRUCTIONS
Avoid alcohol, NSAIDs, spicy, fatty food.  Take the medication prescribed to alleviate symptoms.  Return the emergency department should you develop fever, worsening symptoms, or pain not controlled with the medication prescribed.  Recommend following up with a GI doctor above for further evaluation and to follow-up with your PCP for interim management.

## 2020-10-02 NOTE — ED PROVIDER NOTES
EMERGENCY DEPARTMENT ENCOUNTER    Room Number:  08/08  Date of encounter:  10/2/2020  PCP: Mary Nieves APRN  Historian: Patient      HPI:  Chief Complaint: Patient  A complete HPI/ROS/PMH/PSH/SH/FH are unobtainable due to: Nothing    Context: Christy L Verner is a 50 y.o. female who presents to the ED c/o upper abdominal pain, chest pain, nausea for approximately 1 week.  Patient describes the pain as an 8 out of 10, sharp/burning sensation is been constant since its onset.  She states the pain is worse in the upper abdomen but does radiate into the chest.  She states there has been a mild cough associated with her symptoms and her symptoms are exacerbated by eating.  She denies bloody/bilious vomiting associated with the nausea and states that her bowel movements have been regular and normal.  She denies known history of diabetes mellitus, coronary artery disease, and states she is not taking any antiplatelet or anticoagulant therapy at this time.  She does inform me she still has her gallbladder.  There are no other symptoms to report at this time.    Brief review the patient's medical record reveals that patient was diagnosed with COVID-19 on August 28, 2020 at immediate care.  Patient also had a left rotator cuff repair performed by Dr. Leisa Patel on February 24, 2020.  Other past surgical history includes appendectomy, partial hysterectomy, arthroscopic meniscectomy.      PAST MEDICAL HISTORY  Active Ambulatory Problems     Diagnosis Date Noted   • Injury of left knee 04/25/2018   • H/O thyroidectomy 06/02/2014   • Menopause 03/21/2019   • Complete tear of right rotator cuff 11/13/2019   • Incomplete tear of left rotator cuff 11/13/2019   • Traumatic incomplete tear of left rotator cuff 02/10/2020   • Status post rotator cuff repair 04/07/2020   • S/P rotator cuff repair 05/14/2020     Resolved Ambulatory Problems     Diagnosis Date Noted   • No Resolved Ambulatory Problems     Past Medical History:    Diagnosis Date   • Anesthesia    • Disease of thyroid gland    • Knee pain, left    • Left shoulder pain    • Torn rotator cuff          PAST SURGICAL HISTORY  Past Surgical History:   Procedure Laterality Date   • APPENDECTOMY     • BUNIONECTOMY Bilateral    •  SECTION     • HAND SURGERY Left    • KNEE ARTHROSCOPY Left 2018    Procedure: LEFT KNEE ARTHROSCOPY, EXTENSIVE CHONDROPLASTY, PARTIAL LATERAL MENISECTOMY;  Surgeon: Moe Martínez MD;  Location: [facility] OR [facility];  Service: Orthopedics   • KNEE MENISCAL REPAIR Left    • SHOULDER ARTHROSCOPY W/ ROTATOR CUFF REPAIR Left 2020    Procedure: SHOULDER ARTHROSCOPY WITH ROTATOR CUFF REPAIR, DEBRIDEMENT OF LABRUM, DECOMPRESSION;  Surgeon: Leisa Patel MD;  Location: [facility] OR [facility];  Service: Orthopedics;  Laterality: Left;   • SUBTOTAL HYSTERECTOMY     • TOTAL THYROIDECTOMY           FAMILY HISTORY  Family History   Problem Relation Age of Onset   • Rheum arthritis Mother    • Malig Hyperthermia Neg Hx          SOCIAL HISTORY  Social History     Socioeconomic History   • Marital status:      Spouse name: Not on file   • Number of children: Not on file   • Years of education: Not on file   • Highest education level: Not on file   Tobacco Use   • Smoking status: Former Smoker     Years: 7.00     Types: Cigarettes     Quit date: 2018     Years since quittin.7   • Smokeless tobacco: Never Used   • Tobacco comment: 3-4 CIGARETTES PER DAY   Substance and Sexual Activity   • Alcohol use: No   • Drug use: No   • Sexual activity: Defer     Birth control/protection: Surgical         ALLERGIES  Patient has no known allergies.        REVIEW OF SYSTEMS  Review of Systems   Constitutional: Positive for chills. Negative for fever.   HENT: Negative.    Eyes: Negative.    Respiratory: Negative.    Cardiovascular: Positive for chest pain. Negative for palpitations and leg swelling.   Gastrointestinal: Positive for abdominal pain.   Genitourinary:  Negative.    Musculoskeletal: Negative for back pain.   Skin: Negative.    Neurological: Negative.    Psychiatric/Behavioral: Negative.         All systems reviewed and negative except for those discussed in HPI.       PHYSICAL EXAM    I have reviewed the triage vital signs and nursing notes.    ED Triage Vitals [10/02/20 1356]   Temp Heart Rate Resp BP SpO2   97.4 °F (36.3 °C) 100 18 -- 100 %      Temp src Heart Rate Source Patient Position BP Location FiO2 (%)   Tympanic Monitor -- -- --       Physical Exam  GENERAL: Well-nourished, appears slightly uncomfortable  HENT: nares patent, mucous membranes moist   EYES: no scleral icterus, conjunctiva not pale  CV: regular rhythm, regular rate, no obvious murmurs  RESPIRATORY: normal effort, no adventitious lung sounds  ABDOMEN: TTP upper abdomen without guarding and rebound.  Bowel sounds normal, no mass noted  MUSCULOSKELETAL: no deformity  NEURO: alert, moves all extremities, follows commands  SKIN: warm, dry        LAB RESULTS  Recent Results (from the past 24 hour(s))   Light Blue Top    Collection Time: 10/02/20  3:16 PM   Result Value Ref Range    Extra Tube hold for add-on    Green Top (Gel)    Collection Time: 10/02/20  3:16 PM   Result Value Ref Range    Extra Tube Hold for add-ons.    Lavender Top    Collection Time: 10/02/20  3:16 PM   Result Value Ref Range    Extra Tube hold for add-on    Gold Top - SST    Collection Time: 10/02/20  3:16 PM   Result Value Ref Range    Extra Tube Hold for add-ons.    Comprehensive Metabolic Panel    Collection Time: 10/02/20  3:16 PM    Specimen: Blood   Result Value Ref Range    Glucose 92 65 - 99 mg/dL    BUN 9 6 - 20 mg/dL    Creatinine 0.64 0.57 - 1.00 mg/dL    Sodium 140 136 - 145 mmol/L    Potassium 3.6 3.5 - 5.2 mmol/L    Chloride 101 98 - 107 mmol/L    CO2 31.2 (H) 22.0 - 29.0 mmol/L    Calcium 9.4 8.6 - 10.5 mg/dL    Total Protein 7.6 6.0 - 8.5 g/dL    Albumin 4.30 3.50 - 5.20 g/dL    ALT (SGPT) 12 1 - 33 U/L     AST (SGOT) 16 1 - 32 U/L    Alkaline Phosphatase 69 39 - 117 U/L    Total Bilirubin 0.4 0.0 - 1.2 mg/dL    eGFR  African Amer 120 >60 mL/min/1.73    Globulin 3.3 gm/dL    A/G Ratio 1.3 g/dL    BUN/Creatinine Ratio 14.1 7.0 - 25.0    Anion Gap 7.8 5.0 - 15.0 mmol/L   Lipase    Collection Time: 10/02/20  3:16 PM    Specimen: Blood   Result Value Ref Range    Lipase 12 (L) 13 - 60 U/L   Troponin    Collection Time: 10/02/20  3:16 PM    Specimen: Blood   Result Value Ref Range    Troponin T <0.010 0.000 - 0.030 ng/mL   CBC Auto Differential    Collection Time: 10/02/20  3:16 PM    Specimen: Blood   Result Value Ref Range    WBC 5.77 3.40 - 10.80 10*3/mm3    RBC 4.05 3.77 - 5.28 10*6/mm3    Hemoglobin 11.5 (L) 12.0 - 15.9 g/dL    Hematocrit 35.6 34.0 - 46.6 %    MCV 87.9 79.0 - 97.0 fL    MCH 28.4 26.6 - 33.0 pg    MCHC 32.3 31.5 - 35.7 g/dL    RDW 13.6 12.3 - 15.4 %    RDW-SD 44.1 37.0 - 54.0 fl    MPV 10.8 6.0 - 12.0 fL    Platelets 220 140 - 450 10*3/mm3    Neutrophil % 48.8 42.7 - 76.0 %    Lymphocyte % 41.1 19.6 - 45.3 %    Monocyte % 8.3 5.0 - 12.0 %    Eosinophil % 1.2 0.3 - 6.2 %    Basophil % 0.3 0.0 - 1.5 %    Immature Grans % 0.3 0.0 - 0.5 %    Neutrophils, Absolute 2.81 1.70 - 7.00 10*3/mm3    Lymphocytes, Absolute 2.37 0.70 - 3.10 10*3/mm3    Monocytes, Absolute 0.48 0.10 - 0.90 10*3/mm3    Eosinophils, Absolute 0.07 0.00 - 0.40 10*3/mm3    Basophils, Absolute 0.02 0.00 - 0.20 10*3/mm3    Immature Grans, Absolute 0.02 0.00 - 0.05 10*3/mm3    nRBC 0.0 0.0 - 0.2 /100 WBC   Urinalysis With Microscopic If Indicated (No Culture) - Urine, Clean Catch    Collection Time: 10/02/20  3:42 PM    Specimen: Urine, Clean Catch   Result Value Ref Range    Color, UA Yellow Yellow, Straw    Appearance, UA Clear Clear    pH, UA <=5.0 5.0 - 8.0    Specific Gravity, UA 1.018 1.005 - 1.030    Glucose, UA Negative Negative    Ketones, UA Negative Negative    Bilirubin, UA Negative Negative    Blood, UA Negative Negative     Protein, UA Negative Negative    Leuk Esterase, UA Negative Negative    Nitrite, UA Negative Negative    Urobilinogen, UA 1.0 E.U./dL 0.2 - 1.0 E.U./dL       Ordered the above labs and independently reviewed the results.        RADIOLOGY  Ct Abdomen Pelvis With Contrast    Result Date: 10/2/2020  CT SCAN OF THE ABDOMEN AND PELVIS WITH INTRAVENOUS CONTRAST  HISTORY: Upper abdominal pain.  FINDINGS: The CT scan was performed as an emergency procedure through the abdomen and pelvis with intravenous contrast and demonstrates the followin. The lung bases are clear except for a tiny partially calcified granuloma at the left base. The liver, spleen, pancreas, both adrenal glands, and both kidneys appear normal. The gallbladder may possibly contain a few very tiny gallstones. There is no evidence of wall thickening. 2. There is no aortic aneurysm or retroperitoneal adenopathy. The large and small bowel loops are normal in caliber and show no inflammatory change. No abnormality is seen in the pelvis. 3. At bone windows, no suspicious bone lesions are seen.      Radiation dose reduction techniques were utilized, including automated exposure control and exposure modulation based on body size.       Xr Chest 1 View    Result Date: 10/2/2020  ONE VIEW PORTABLE CHEST  HISTORY: Chest pain. Cough. Positive Covid 19 test.  FINDINGS: There is somewhat decreased lung expansion when compared to the study of 2018. There is no evidence of localized pneumonia. The heart size remains normal.  This report was finalized on 10/2/2020 5:26 PM by Dr. Manuel Mccain M.D.        I ordered the above noted radiological studies. Reviewed by me and discussed with radiologist.  See dictation for official radiology interpretation.      PROCEDURES    Procedures      MEDICATIONS GIVEN IN ER    Medications   sodium chloride 0.9 % flush 10 mL (has no administration in time range)   famotidine (PEPCID) injection 20 mg (20 mg Intravenous Given  10/2/20 1539)   sodium chloride 0.9 % bolus 1,000 mL (0 mL Intravenous Stopped 10/2/20 1600)   ondansetron (ZOFRAN) injection 4 mg (4 mg Intravenous Given 10/2/20 1536)   HYDROmorphone (DILAUDID) injection 0.5 mg (0.5 mg Intravenous Given 10/2/20 1539)   iopamidol (ISOVUE-300) 61 % injection 100 mL (85 mL Intravenous Given by Other 10/2/20 1714)   aluminum-magnesium hydroxide-simethicone (MAALOX MAX) 400-400-40 MG/5ML suspension 15 mL (15 mL Oral Given 10/2/20 1832)   Lidocaine Viscous HCl (XYLOCAINE) 2 % mouth solution 15 mL (15 mL Mouth/Throat Given 10/2/20 1832)         PROGRESS, DATA ANALYSIS, CONSULTS, AND MEDICAL DECISION MAKING    All labs have been independently reviewed by me.  All radiology studies have been reviewed by me and discussed with radiologist dictating the report.   EKG's independently viewed and interpreted by me.  Discussion below represents my analysis of pertinent findings related to patient's condition, differential diagnosis, treatment plan and final disposition.    DDX includes but is not limited to: ACS, GERD/gastritis, PTX, PE, PNA, cholecystitis, biliary colic, epiploic appendagitis, viral gastroenteritis, other viral syndrome.  History, physical exam most consistent with gastritis.  Patient has significant relief with a GI cocktail while in the emergency department.  Plan to treat with a PPI, Carafate, Bentyl for spasm and abdominal pain.  We will give the patient GI to follow-up with for further evaluation as needed.  We will have her to use her primary care physician for interim care.  We will have her return to the emergency department should she develop a fever, should her pain worsen, or should the medication not control her pain.    ED Course as of Oct 02 1918   Fri Oct 02, 2020   1532 BP: 149/95 [RC]   1532 Temp: 97.4 °F (36.3 °C) [RC]   1532 Heart Rate: 100 [RC]   1532 Resp: 18 [RC]   1532 SpO2: 100 % [RC]   1742 WBC: 5.77 [RC]   1742 Hemoglobin(!): 11.5 [RC]   1742  Hematocrit: 35.6 [RC]   1742 Platelets: 220 [RC]   1742 Troponin T: <0.010 [RC]   1742 Lipase(!): 12 [RC]   1742 Leukocytes, UA: Negative [RC]   1742 Nitrite, UA: Negative [RC]   1742 Blood, UA: Negative [RC]   1742 Glucose: 92 [RC]   1742 BUN: 9 [RC]   1742 Creatinine: 0.64 [RC]   1742 Sodium: 140 [RC]   1742 Potassium: 3.6 [RC]   1742 CO2(!): 31.2 [RC]   1742 Anion Gap: 7.8 [RC]   1743 Albumin: 4.30 [RC]   1743 ALT (SGPT): 12 [RC]   1743 AST (SGOT): 16 [RC]   1743 Alkaline Phosphatase: 69 [RC]   1743 Total Bilirubin: 0.4 [RC]   1747 I have viewed the patient's chest x-ray and there are no infiltrates or acute findings.    [RC]      ED Course User Index  [RC] Patrice Shankar III, PA       Patient was placed in face mask in first look. Patient was wearing facemask when I entered the room and throughout our encounter. I wore full protective equipment throughout this patient encounter including a face mask, and gloves. Hand hygiene was performed before donning protective equipment and after removal when leaving the room.    AS OF 19:18 EDT VITALS:    BP - 129/68  HR - 91  TEMP - 97.4 °F (36.3 °C) (Tympanic)  O2 SATS - 99%        DIAGNOSIS  Final diagnoses:   Epigastric pain         DISPOSITION  DISCHARGE    Patient discharged in stable condition.    Reviewed implications of results, diagnosis, meds, responsibility to follow up, warning signs and symptoms of possible worsening, potential complications and reasons to return to ER.    Patient/Family voiced understanding of above instructions.    Discussed plan for discharge, as there is no emergent indication for admission. Patient referred to primary care provider for BP management due to today's BP. Pt/family is agreeable and understands need for follow up and repeat testing.  Pt is aware that discharge does not mean that nothing is wrong but it indicates no emergency is present that requires admission and they must continue care with follow-up as given below  or physician of their choice.     FOLLOW-UP  Melissa Tamez MD  [address]  [address]  [facility] [id number] [phone]    Schedule an appointment as soon as possible for a visit   For further evaluation and treatment    Mary Nieves, APRN  [address]  [address]  [facility] [id number] [phone]    Schedule an appointment as soon as possible for a visit   For further evaluation and treatment, and interim management         Medication List      New Prescriptions    dicyclomine 20 MG tablet  Commonly known as: BENTYL  Take 1 tablet by mouth Every 6 (Six) Hours.     pantoprazole 40 MG EC tablet  Commonly known as: PROTONIX  Take 1 tablet by mouth Daily.     sucralfate 1 g tablet  Commonly known as: CARAFATE  Take 1 tablet by mouth 4 (Four) Times a Day.        Stop    meloxicam 15 MG tablet  Commonly known as: MOBIC           Where to Get Your Medications      You can get these medications from any pharmacy    Bring a paper prescription for each of these medications  · dicyclomine 20 MG tablet  · pantoprazole 40 MG EC tablet  · sucralfate 1 g tablet                Patrice Shankar III, PA  10/02/20 [id number]

## 2020-10-02 NOTE — ED NOTES
This RN wearing mask, glasses, face shield, gloves, and gown while in pt's room. Pt wearing mask. Enhanced droplet/contact isolation sign in place. Reassurance given; call light in reach. Pts breathing even and unlabored. Pt appears in NAD at this time.        Mariann Cortes, RN  10/02/20 8193

## 2020-10-05 ENCOUNTER — TELEPHONE (OUTPATIENT)
Dept: ORTHOPEDIC SURGERY | Facility: CLINIC | Age: 50
End: 2020-10-05

## 2020-10-05 ENCOUNTER — EPISODE CHANGES (OUTPATIENT)
Dept: CASE MANAGEMENT | Facility: OTHER | Age: 50
End: 2020-10-05

## 2020-10-05 NOTE — TELEPHONE ENCOUNTER
CALLED PT TO RESCHEDULE HER APPT PER DR CORNELIUS REQUEST AND HAD TO LEAVE A V/M FOR RETURN CALL/DM

## 2020-10-06 ENCOUNTER — TELEPHONE (OUTPATIENT)
Dept: FAMILY MEDICINE CLINIC | Facility: CLINIC | Age: 50
End: 2020-10-06

## 2020-10-06 DIAGNOSIS — U07.1 COVID-19 VIRUS INFECTION: Primary | ICD-10-CM

## 2020-10-06 DIAGNOSIS — R10.13 EPIGASTRIC PAIN: ICD-10-CM

## 2020-10-06 NOTE — TELEPHONE ENCOUNTER
Patient was seen in [facility] ER last week for abd pain. She was told to as pcp for a referral to gastro. Please advise. Records in chart.

## 2020-10-06 NOTE — TELEPHONE ENCOUNTER
PATIENT STATES SHE NEEDS A REFERRAL TO GO TO A GASTRO DR. SHE WENT TO URGENT CARE YESTERDAY AND THEY PUT HER ON THREE DIFFERENT MEDICATIONS.    PLEASE ADVISE  [phone]

## 2020-10-26 ENCOUNTER — OFFICE VISIT (OUTPATIENT)
Dept: ORTHOPEDIC SURGERY | Facility: CLINIC | Age: 50
End: 2020-10-26

## 2020-10-26 ENCOUNTER — OFFICE VISIT (OUTPATIENT)
Dept: GASTROENTEROLOGY | Facility: CLINIC | Age: 50
End: 2020-10-26

## 2020-10-26 ENCOUNTER — TELEPHONE (OUTPATIENT)
Dept: FAMILY MEDICINE CLINIC | Facility: CLINIC | Age: 50
End: 2020-10-26

## 2020-10-26 VITALS — HEIGHT: 64 IN | WEIGHT: 205.2 LBS | BODY MASS INDEX: 35.03 KG/M2 | TEMPERATURE: 96.7 F

## 2020-10-26 VITALS — TEMPERATURE: 97.5 F | WEIGHT: 209 LBS | HEIGHT: 64 IN | BODY MASS INDEX: 35.68 KG/M2

## 2020-10-26 DIAGNOSIS — M75.121 COMPLETE TEAR OF RIGHT ROTATOR CUFF, UNSPECIFIED WHETHER TRAUMATIC: Primary | ICD-10-CM

## 2020-10-26 DIAGNOSIS — Z98.890 S/P ROTATOR CUFF REPAIR: ICD-10-CM

## 2020-10-26 DIAGNOSIS — R10.13 EPIGASTRIC PAIN: Primary | ICD-10-CM

## 2020-10-26 DIAGNOSIS — R11.0 NAUSEA: ICD-10-CM

## 2020-10-26 PROCEDURE — 99213 OFFICE O/P EST LOW 20 MIN: CPT | Performed by: ORTHOPAEDIC SURGERY

## 2020-10-26 PROCEDURE — 99203 OFFICE O/P NEW LOW 30 MIN: CPT | Performed by: INTERNAL MEDICINE

## 2020-10-26 RX ORDER — SODIUM CHLORIDE, SODIUM LACTATE, POTASSIUM CHLORIDE, CALCIUM CHLORIDE 600; 310; 30; 20 MG/100ML; MG/100ML; MG/100ML; MG/100ML
30 INJECTION, SOLUTION INTRAVENOUS CONTINUOUS
Status: CANCELLED | OUTPATIENT
Start: 2020-11-20

## 2020-10-26 RX ORDER — CEFAZOLIN SODIUM 2 G/100ML
2 INJECTION, SOLUTION INTRAVENOUS ONCE
Status: CANCELLED | OUTPATIENT
Start: 2020-12-08 | End: 2020-10-26

## 2020-10-26 RX ORDER — PANTOPRAZOLE SODIUM 40 MG/1
40 TABLET, DELAYED RELEASE ORAL DAILY
Qty: 30 TABLET | Refills: 5 | Status: SHIPPED | OUTPATIENT
Start: 2020-10-26 | End: 2020-12-29 | Stop reason: DRUGHIGH

## 2020-10-26 NOTE — PROGRESS NOTES
"Chief Complaint   Patient presents with   • Heartburn   • Abdominal Pain     mid abd pain   • Constipation        Christy L Verner is a  50 y.o. female here for an initial visit for GERD, abdominal pain, constipation    HPI this 50-year-old -American female patient of Mary HOOKER presents after an emergency room visit for upper abdominal pain.  CT scan was essentially negative except for tiny gallstones but no thickening of the wall.  She was sent home on pantoprazole and dicyclomine.  She notes improvement of her symptoms with the pantoprazole.  She also complains of some constipation which may be medication related.  We did talk about the need for screening colonoscopy but will defer this for the present.    Past Medical History:   Diagnosis Date   • Abdominal pain     9/2020   • Anesthesia     \"SLOW TO AWAKEN\"-PT'S MOTHER TOOK 1 WEEK TO COME OUT OF ANESTHESIA   • Disease of thyroid gland    • GERD (gastroesophageal reflux disease)    • Knee pain, left    • Left shoulder pain    • Torn rotator cuff     LEFT       Current Outpatient Medications   Medication Sig Dispense Refill   • dicyclomine (BENTYL) 20 MG tablet Take 1 tablet by mouth Every 6 (Six) Hours. 24 tablet 0   • famotidine (Pepcid) 20 MG tablet Take 1 tablet by mouth 2 (Two) Times a Day As Needed for Indigestion or Heartburn. 60 tablet 0   • gabapentin (NEURONTIN) 600 MG tablet Take 600 mg by mouth Every Night.     • ondansetron ODT (Zofran ODT) 4 MG disintegrating tablet Place 1 tablet on the tongue Every 8 (Eight) Hours As Needed for Nausea or Vomiting. 21 tablet 0   • oxyCODONE-acetaminophen (PERCOCET) 5-325 MG per tablet Take 1 tablet by mouth 2 (two) times a day as needed 60 tablet 0   • tiZANidine (ZANAFLEX) 4 MG tablet Take 1 tablet by mouth at bedtime as needed 30 tablet 0   • traZODone (DESYREL) 50 MG tablet Take 1-2 tablets by mouth daily 60 tablet 0   • amitriptyline (ELAVIL) 100 MG tablet Take 100 mg by mouth At Night As " Needed.  2   • methocarbamol (ROBAXIN) 500 MG tablet Take 1 tablet by mouth 4 (Four) Times a Day. 60 tablet 1   • oxyCODONE (ROXICODONE) 5 MG immediate release tablet Take 1 tablet by mouth twice a day as needed 60 tablet 0   • oxyCODONE-acetaminophen (PERCOCET) 5-325 MG per tablet Take 1 tablet by mouth Every 6 (Six) Hours As Needed For severe pain. 45 tablet 0   • pantoprazole (PROTONIX) 40 MG EC tablet Take 1 tablet by mouth Daily. 14 tablet 0   • sucralfate (CARAFATE) 1 g tablet Take 1 tablet by mouth 4 (Four) Times a Day. 28 tablet 0   • traZODone (DESYREL) 100 MG tablet Take 1 tablet by mouth Every Night. PLEASE CALL THE OFFICE FOR AN APPT 30 tablet 0     No current facility-administered medications for this visit.        PRN Meds:.    No Known Allergies    Social History     Socioeconomic History   • Marital status:      Spouse name: Not on file   • Number of children: Not on file   • Years of education: Not on file   • Highest education level: Not on file   Tobacco Use   • Smoking status: Former Smoker     Years: 7.00     Types: Cigarettes     Quit date: 2018     Years since quittin.8   • Smokeless tobacco: Never Used   • Tobacco comment: 3-4 CIGARETTES PER DAY   Substance and Sexual Activity   • Alcohol use: No   • Drug use: No   • Sexual activity: Defer     Birth control/protection: Surgical       Family History   Problem Relation Age of Onset   • Rheum arthritis Mother    • Malig Hyperthermia Neg Hx        Review of Systems   Constitutional: Negative for activity change, appetite change, fatigue and unexpected weight change.   HENT: Negative for congestion, facial swelling, sore throat, trouble swallowing and voice change.    Eyes: Negative for photophobia and visual disturbance.   Respiratory: Negative for cough and choking.    Cardiovascular: Negative for chest pain.   Gastrointestinal: Positive for abdominal pain, constipation and nausea. Negative for abdominal distention, anal bleeding,  blood in stool, diarrhea, rectal pain and vomiting.   Endocrine: Negative for polyphagia.   Musculoskeletal: Negative for arthralgias, gait problem and joint swelling.   Skin: Negative for color change, pallor and rash.   Allergic/Immunologic: Negative for food allergies.   Neurological: Negative for speech difficulty and headaches.   Hematological: Does not bruise/bleed easily.   Psychiatric/Behavioral: Negative for agitation, confusion and sleep disturbance.       Vitals:    10/26/20 1603   Temp: 96.7 °F (35.9 °C)       Physical Exam  Vitals signs reviewed.   Constitutional:       General: She is not in acute distress.     Appearance: She is well-developed. She is not diaphoretic.   HENT:      Head: Normocephalic.      Mouth/Throat:      Pharynx: No oropharyngeal exudate.   Eyes:      General: No scleral icterus.     Conjunctiva/sclera: Conjunctivae normal.   Neck:      Musculoskeletal: Normal range of motion.      Thyroid: No thyromegaly.   Cardiovascular:      Rate and Rhythm: Normal rate and regular rhythm.      Heart sounds: No murmur.   Pulmonary:      Effort: No respiratory distress.      Breath sounds: Normal breath sounds. No wheezing or rales.   Abdominal:      General: Bowel sounds are normal. There is no distension.      Palpations: Abdomen is soft. There is no mass.      Tenderness: There is no abdominal tenderness.   Musculoskeletal: Normal range of motion.         General: No tenderness.   Lymphadenopathy:      Cervical: No cervical adenopathy.   Skin:     General: Skin is warm and dry.      Findings: No erythema or rash.   Neurological:      Mental Status: She is alert and oriented to person, place, and time.   Psychiatric:         Behavior: Behavior normal.         ASSESSMENT   #1 abdominal pain: Epigastric  #2 nausea  #3 constipation: Possible medication influence      PLAN  Schedule EGD  Continue pantoprazole  Trial of stool softener      ICD-10-CM ICD-9-CM   1. Epigastric pain  R10.13 789.06

## 2020-10-27 PROBLEM — R10.13 EPIGASTRIC PAIN: Status: ACTIVE | Noted: 2020-10-27

## 2020-10-27 PROBLEM — R11.0 NAUSEA: Status: ACTIVE | Noted: 2020-10-27

## 2020-11-09 ENCOUNTER — TRANSCRIBE ORDERS (OUTPATIENT)
Dept: LAB | Facility: HOSPITAL | Age: 50
End: 2020-11-09

## 2020-11-09 DIAGNOSIS — Z01.818 OTHER SPECIFIED PRE-OPERATIVE EXAMINATION: Primary | ICD-10-CM

## 2020-11-18 ENCOUNTER — LAB (OUTPATIENT)
Dept: LAB | Facility: HOSPITAL | Age: 50
End: 2020-11-18

## 2020-11-18 DIAGNOSIS — Z01.818 OTHER SPECIFIED PRE-OPERATIVE EXAMINATION: ICD-10-CM

## 2020-11-18 PROCEDURE — U0004 COV-19 TEST NON-CDC HGH THRU: HCPCS

## 2020-11-18 PROCEDURE — C9803 HOPD COVID-19 SPEC COLLECT: HCPCS

## 2020-11-19 LAB — SARS-COV-2 RNA RESP QL NAA+PROBE: NOT DETECTED

## 2020-11-20 ENCOUNTER — ANESTHESIA EVENT (OUTPATIENT)
Dept: GASTROENTEROLOGY | Facility: HOSPITAL | Age: 50
End: 2020-11-20

## 2020-11-20 ENCOUNTER — ANESTHESIA (OUTPATIENT)
Dept: GASTROENTEROLOGY | Facility: HOSPITAL | Age: 50
End: 2020-11-20

## 2020-11-20 ENCOUNTER — HOSPITAL ENCOUNTER (OUTPATIENT)
Facility: HOSPITAL | Age: 50
Setting detail: HOSPITAL OUTPATIENT SURGERY
Discharge: HOME | End: 2020-11-20
Attending: INTERNAL MEDICINE | Admitting: INTERNAL MEDICINE

## 2020-11-20 VITALS
WEIGHT: 201.6 LBS | SYSTOLIC BLOOD PRESSURE: 117 MMHG | TEMPERATURE: 98.6 F | OXYGEN SATURATION: 100 % | DIASTOLIC BLOOD PRESSURE: 83 MMHG | BODY MASS INDEX: 34.42 KG/M2 | RESPIRATION RATE: 16 BRPM | HEIGHT: 64 IN | HEART RATE: 94 BPM

## 2020-11-20 DIAGNOSIS — R10.13 EPIGASTRIC PAIN: ICD-10-CM

## 2020-11-20 DIAGNOSIS — R11.0 NAUSEA: ICD-10-CM

## 2020-11-20 LAB — UREASE TISS QL: POSITIVE

## 2020-11-20 PROCEDURE — 43239 EGD BIOPSY SINGLE/MULTIPLE: CPT | Performed by: INTERNAL MEDICINE

## 2020-11-20 PROCEDURE — 88305 TISSUE EXAM BY PATHOLOGIST: CPT | Performed by: INTERNAL MEDICINE

## 2020-11-20 PROCEDURE — 25010000002 PROPOFOL 10 MG/ML EMULSION: Performed by: ANESTHESIOLOGY

## 2020-11-20 PROCEDURE — S0260 H&P FOR SURGERY: HCPCS | Performed by: INTERNAL MEDICINE

## 2020-11-20 PROCEDURE — 87081 CULTURE SCREEN ONLY: CPT | Performed by: INTERNAL MEDICINE

## 2020-11-20 RX ORDER — SODIUM CHLORIDE, SODIUM LACTATE, POTASSIUM CHLORIDE, CALCIUM CHLORIDE 600; 310; 30; 20 MG/100ML; MG/100ML; MG/100ML; MG/100ML
30 INJECTION, SOLUTION INTRAVENOUS CONTINUOUS
Status: DISCONTINUED | OUTPATIENT
Start: 2020-11-20 | End: 2020-11-20 | Stop reason: HOSPADM

## 2020-11-20 RX ORDER — LIDOCAINE HYDROCHLORIDE 20 MG/ML
INJECTION, SOLUTION INFILTRATION; PERINEURAL AS NEEDED
Status: DISCONTINUED | OUTPATIENT
Start: 2020-11-20 | End: 2020-11-20 | Stop reason: SURG

## 2020-11-20 RX ORDER — PROPOFOL 10 MG/ML
VIAL (ML) INTRAVENOUS AS NEEDED
Status: DISCONTINUED | OUTPATIENT
Start: 2020-11-20 | End: 2020-11-20 | Stop reason: SURG

## 2020-11-20 RX ORDER — PROPOFOL 10 MG/ML
VIAL (ML) INTRAVENOUS CONTINUOUS PRN
Status: DISCONTINUED | OUTPATIENT
Start: 2020-11-20 | End: 2020-11-20 | Stop reason: SURG

## 2020-11-20 RX ADMIN — LIDOCAINE HYDROCHLORIDE 60 MG: 20 INJECTION, SOLUTION INFILTRATION; PERINEURAL at 14:07

## 2020-11-20 RX ADMIN — PROPOFOL 50 MG: 10 INJECTION, EMULSION INTRAVENOUS at 14:18

## 2020-11-20 RX ADMIN — SODIUM CHLORIDE, POTASSIUM CHLORIDE, SODIUM LACTATE AND CALCIUM CHLORIDE 30 ML/HR: 600; 310; 30; 20 INJECTION, SOLUTION INTRAVENOUS at 13:56

## 2020-11-20 RX ADMIN — PROPOFOL 75 MG: 10 INJECTION, EMULSION INTRAVENOUS at 14:12

## 2020-11-20 RX ADMIN — PROPOFOL 125 MCG/KG/MIN: 10 INJECTION, EMULSION INTRAVENOUS at 14:13

## 2020-11-20 NOTE — ANESTHESIA POSTPROCEDURE EVALUATION
Patient: Christy L Verner    Procedure Summary     Date: 11/20/20 Room / Location:  [facility]    Anesthesia Start: 1405 Anesthesia Stop: 1429    Procedure: ESOPHAGOGASTRODUODENOSCOPY WITH BIOPSIES (N/A Esophagus) Diagnosis:       Gastritis      Hiatal hernia      (Epigastric pain [R10.13])      (Nausea [R11.0])    Surgeon: Lele Christian MD Provider: Phani Langston MD    Anesthesia Type: general ASA Status: 2          Anesthesia Type: general    Vitals  Vitals Value Taken Time   /86 11/20/20 1427   Temp     Pulse 106 11/20/20 1427   Resp     SpO2 96 % 11/20/20 1427           Post Anesthesia Care and Evaluation    Patient location during evaluation: PHASE II  Patient participation: complete - patient participated  Level of consciousness: awake  Pain score: 1  Pain management: adequate  Airway patency: patent  Anesthetic complications: No anesthetic complications  PONV Status: none  Cardiovascular status: acceptable  Respiratory status: acceptable  Hydration status: acceptable

## 2020-11-20 NOTE — H&P
"[facility]  Pre Procedure History & Physical    Chief Complaint:   GERD, epigastric pain, nausea    Subjective     HPI:   This 50-year-old female presents the endoscopy suite for upper endoscopic evaluation.  She had emergency room visit because of epigastric pain with nausea.  She has been treated with a proton pump inhibitor which affords some symptomatic relief.    Past Medical History:   Past Medical History:   Diagnosis Date   • Abdominal pain     2020   • Anesthesia     \"SLOW TO AWAKEN\"-PT'S MOTHER TOOK 1 WEEK TO COME OUT OF ANESTHESIA   • Arthritis    • Disease of thyroid gland    • GERD (gastroesophageal reflux disease)    • Knee pain, left    • Left shoulder pain    • PONV (postoperative nausea and vomiting)    • Torn rotator cuff     LEFT       Past Surgical History:  Past Surgical History:   Procedure Laterality Date   • APPENDECTOMY     • BUNIONECTOMY Bilateral    •  SECTION     • HAND SURGERY Left    • KNEE ARTHROSCOPY Left 2018    Procedure: LEFT KNEE ARTHROSCOPY, EXTENSIVE CHONDROPLASTY, PARTIAL LATERAL MENISECTOMY;  Surgeon: Moe Martínez MD;  Location: [facility] OR [facility] [place];  Service: Orthopedics   • KNEE MENISCAL REPAIR Left    • SHOULDER ARTHROSCOPY W/ ROTATOR CUFF REPAIR Left 2020    Procedure: SHOULDER ARTHROSCOPY WITH ROTATOR CUFF REPAIR, DEBRIDEMENT OF LABRUM, DECOMPRESSION;  Surgeon: Leisa Patel MD;  Location: [facility] OR [facility] [place];  Service: Orthopedics;  Laterality: Left;   • SUBTOTAL HYSTERECTOMY     • TOTAL THYROIDECTOMY         Family History:  Family History   Problem Relation Age of Onset   • Rheum arthritis Mother    • Malig Hyperthermia Neg Hx        Social History:   reports that she quit smoking about 2 years ago. Her smoking use included cigarettes. She quit after 7.00 years of use. She has never used smokeless tobacco. She reports that she does not drink alcohol or use drugs.    Medications:   No medications prior to admission. " "      Allergies:  Patient has no known allergies.    ROS:    Pertinent items are noted in HPI, all other systems reviewed and negative     Objective     Height 162.6 cm (64\"), weight 92.5 kg (204 lb).    Physical Exam   Constitutional: Pt is oriented to person, place, and time and well-developed, well-nourished, and in no distress.   Mouth/Throat: Oropharynx is clear and moist.   Neck: Normal range of motion.   Cardiovascular: Normal rate, regular rhythm and normal heart sounds.    Pulmonary/Chest: Effort normal and breath sounds normal.   Abdominal: Soft. Nontender  Skin: Skin is warm and dry.   Psychiatric: Mood, memory, affect and judgment normal.     Assessment/Plan     Diagnosis:  GERD  Epigastric pain  Nausea    Anticipated Surgical Procedure:  EGD    The risks, benefits, and alternatives of this procedure have been discussed with the patient or the responsible party- the patient understands and agrees to proceed.                                                          "

## 2020-11-20 NOTE — ANESTHESIA PREPROCEDURE EVALUATION
Anesthesia Evaluation     Patient summary reviewed   history of anesthetic complications: PONV               Airway   No difficulty expected  Dental      Pulmonary    Cardiovascular     Rhythm: regular        Neuro/Psych  GI/Hepatic/Renal/Endo    (+)  GERD,      Musculoskeletal     Abdominal    Substance History      OB/GYN          Other   arthritis,                      Anesthesia Plan    ASA 2     general       Anesthetic plan, all risks, benefits, and alternatives have been provided, discussed and informed consent has been obtained with: patient.

## 2020-11-24 LAB
LAB AP CASE REPORT: NORMAL
PATH REPORT.FINAL DX SPEC: NORMAL
PATH REPORT.GROSS SPEC: NORMAL

## 2020-11-30 ENCOUNTER — TELEPHONE (OUTPATIENT)
Dept: GASTROENTEROLOGY | Facility: CLINIC | Age: 50
End: 2020-11-30

## 2020-11-30 RX ORDER — CLARITHROMYCIN 500 MG/1
500 TABLET, COATED ORAL 2 TIMES DAILY
Qty: 28 TABLET | Refills: 0 | Status: SHIPPED | OUTPATIENT
Start: 2020-11-30 | End: 2021-01-04

## 2020-11-30 RX ORDER — PANTOPRAZOLE SODIUM 40 MG/1
40 TABLET, DELAYED RELEASE ORAL 2 TIMES DAILY
Qty: 28 TABLET | Refills: 0 | Status: SHIPPED | OUTPATIENT
Start: 2020-11-30 | End: 2020-12-31 | Stop reason: SDUPTHER

## 2020-11-30 RX ORDER — AMOXICILLIN 500 MG/1
1000 CAPSULE ORAL 2 TIMES DAILY
Qty: 56 CAPSULE | Refills: 0 | Status: SHIPPED | OUTPATIENT
Start: 2020-11-30 | End: 2021-01-04

## 2020-11-30 NOTE — TELEPHONE ENCOUNTER
----- Message from Lele FOLEY MD sent at 11/21/2020  3:36 PM EST -----  Regarding: Urease testing  Urease positive for H. pylori, awaiting path results but will need to consider Prevpac or Pylera once those are reviewed.  ----- Message -----  From: Lab, Background User  Sent: 11/20/2020   3:43 PM EST  To: Lele FOLEY MD

## 2020-11-30 NOTE — TELEPHONE ENCOUNTER
Call from pt.  Advise per path report that duodenal bx unremarkable.  Stomach body with H pylori gastritis.  GE junction with reflux esophagitis and H pylori.     Advise per Dr Christian note.  Verb understanding.   Denies med allergies.     Escribe initiated for biaxin 500 mg 1 tab po 2x/day, #28, R0.  Amoxicillin 500 mg 2 tabs by mouth 2x/day, #56, R0.  Pantoprazole 40 mg 1 tab po 2x/day, #28, R0 with note to then resume daily pantoprazole upon completion of H pylori rx.  Message to DR Christian.

## 2020-11-30 NOTE — TELEPHONE ENCOUNTER
----- Message from Lele FOLEY MD sent at 11/24/2020  5:42 PM EST -----  Regarding: Biopsy results  Okay to call results, with positive H. pylori would treat with Prevpac or Pylera and once completed continue once daily PPI.  ----- Message -----  From: Lab, Background User  Sent: 11/20/2020   3:43 PM EST  To: Lele FOLEY MD

## 2020-12-07 ENCOUNTER — TRANSCRIBE ORDERS (OUTPATIENT)
Dept: PHYSICAL THERAPY | Facility: HOSPITAL | Age: 50
End: 2020-12-07

## 2020-12-07 DIAGNOSIS — M54.50 LOW BACK PAIN, UNSPECIFIED BACK PAIN LATERALITY, UNSPECIFIED CHRONICITY, UNSPECIFIED WHETHER SCIATICA PRESENT: Primary | ICD-10-CM

## 2020-12-18 ENCOUNTER — HOSPITAL ENCOUNTER (OUTPATIENT)
Dept: PHYSICAL THERAPY | Facility: HOSPITAL | Age: 50
Setting detail: THERAPIES SERIES
Discharge: HOME | End: 2020-12-18

## 2020-12-18 DIAGNOSIS — R26.2 DIFFICULTY WALKING: ICD-10-CM

## 2020-12-18 DIAGNOSIS — G89.29 CHRONIC BILATERAL LOW BACK PAIN WITHOUT SCIATICA: Primary | ICD-10-CM

## 2020-12-18 DIAGNOSIS — M54.50 CHRONIC BILATERAL LOW BACK PAIN WITHOUT SCIATICA: Primary | ICD-10-CM

## 2020-12-18 PROCEDURE — 97110 THERAPEUTIC EXERCISES: CPT

## 2020-12-18 PROCEDURE — 97161 PT EVAL LOW COMPLEX 20 MIN: CPT

## 2020-12-18 NOTE — THERAPY EVALUATION
"    Outpatient Physical Therapy Ortho Initial Evaluation  [facility]     Patient Name: Christy L Verner  : 1970  MRN: [id number]  Today's Date: 2020      Visit Date: 2020    Patient Active Problem List   Diagnosis   • Injury of left knee   • H/O thyroidectomy   • Menopause   • Complete tear of right rotator cuff   • Incomplete tear of left rotator cuff   • Traumatic incomplete tear of left rotator cuff   • Status post rotator cuff repair   • S/P rotator cuff repair   • Epigastric pain   • Nausea        Past Medical History:   Diagnosis Date   • Abdominal pain     2020   • Anesthesia     \"SLOW TO AWAKEN\"-PT'S MOTHER TOOK 1 WEEK TO COME OUT OF ANESTHESIA   • Arthritis    • Disease of thyroid gland    • GERD (gastroesophageal reflux disease)    • Knee pain, left    • Left shoulder pain    • PONV (postoperative nausea and vomiting)    • Torn rotator cuff     LEFT        Past Surgical History:   Procedure Laterality Date   • APPENDECTOMY     • BUNIONECTOMY Bilateral    •  SECTION     • ENDOSCOPY N/A 2020    Procedure: ESOPHAGOGASTRODUODENOSCOPY WITH BIOPSIES;  Surgeon: Lele Christian MD;  Location: [facility] ENDOSCOPY;  Service: Gastroenterology;  Laterality: N/A;  PRE- REFLUX, EPIGASTRIC PAIN, NAUSEA  POST- HIATAL HERNIA, GASTRITIS   • HAND SURGERY Left    • KNEE ARTHROSCOPY Left 2018    Procedure: LEFT KNEE ARTHROSCOPY, EXTENSIVE CHONDROPLASTY, PARTIAL LATERAL MENISECTOMY;  Surgeon: Moe Martínez MD;  [place] [facility] [place] [facility] [place]  Service: Orthopedics   • KNEE MENISCAL REPAIR Left    • SHOULDER ARTHROSCOPY W/ ROTATOR CUFF REPAIR Left 2020    Procedure: SHOULDER ARTHROSCOPY WITH ROTATOR CUFF REPAIR, DEBRIDEMENT OF LABRUM, DECOMPRESSION;  Surgeon: Leisa Patel MD;  [place] [facility] [place] [facility] [place]  Service: Orthopedics;  Laterality: Left;   • SUBTOTAL HYSTERECTOMY     • TOTAL THYROIDECTOMY         Visit Dx:     ICD-10-CM ICD-9-CM   1. Chronic bilateral low back pain " without sciatica  M54.5 724.2    G89.29 338.29   2. Difficulty walking  R26.2 719.7         Patient History     Row Name 12/18/20 1200             History    Chief Complaint  Pain  -RS      Type of Pain  Back pain  -RS      Date Current Problem(s) Began  12/18/19 approximate  -RS      Brief Description of Current Complaint  The pt is a 51 yo female who presents with low back pain, bilateral, no n/t. She had injections which did not help and now is going for an ablasion, has to go to PT first. She works as a manager in the kitchen and has pain that increases significantly  With prolonged sitting, standing, walking. She has significantly increased pain with walking. Standing after sitting. She wakes up t night with pain every night. She has to lift/reach/wlk /stand at work, she gets help to lift often. Has L knee pain and B shoulder pain. Pain is 10/10 at end of work day.  -RS      Hand Dominance  right-handed  -RS      Occupation/sports/leisure activities   [employer]  -RS         Pain     Pain Location  Back  -RS      Pain at Present  7  -RS      Pain at Worst  10  -RS         Fall Risk Assessment    Any falls in the past year:  No  -RS         Services    Are you currently receiving Home Health services  No  -RS         Daily Activities    Primary Language  English  -RS      Pt Participated in POC and Goals  Yes  -RS         Safety    Are you being hurt, hit, or frightened by anyone at home or in your life?  No  -RS      Are you being neglected by a caregiver  No  -RS        User Key  (r) = Recorded By, (t) = Taken By, (c) = Cosigned By    Initials Name Provider Type    RS Ashley Velazquez PT Physical Therapist          PT Ortho     Row Name 12/18/20 1200       Posture/Observations    Alignment Options  Forward head;Rounded shoulders;Scapular elevation  -RS    Forward Head  Mild;Moderate  -RS    Rounded Shoulders  Left:;Moderate  -RS       Neural Tension Signs- Lower Quarter Clearing    Slump   Bilateral:;Negative  -RS       Myotomal Screen- Lower Quarter Clearing    Hip flexion (L2)  Right:;4 (Good);Left:;4- (Good -)  -RS    Knee extension (L3)  Right:;4+ (Good +);Left:;4 (Good)  -RS    Ankle DF (L4)  Bilateral:;4 (Good)  -RS    Ankle PF (S1)  Right:;4 (Good);Left:;4- (Good -)  -RS    Knee flexion (S2)  Right:;4 (Good);Left:;4- (Good -)  -RS       Lumbar ROM Screen- Lower Quarter Clearing    Lumbar Flexion  Impaired 50% with B pain  -RS    Lumbar Extension  Impaired 20%- more pain than flex  -RS    Lumbar Lateral Flexion  Normal  -RS    Lumbar Rotation  Impaired 30-40% bilaterally  -RS       Special Tests/Palpation    Special Tests/Palpation  Lumbar/SI  -RS       Lumbosacral Accessory Motions    Lumbosacral Accessory Motions Tested?  Yes  -RS    PA [place]- L1  Center:;Hypomobile  -RS    PA [place]- L2  Center:;Hypomobile  -RS    PA [place]- L3  Center:;Hypomobile  -RS    PA [place]- L4  Center:;Hypomobile  -RS    PA [place]- L5  Center:;Hypomobile  -RS       Lumbosacral Palpation    Lumbosacral Palpation?  Yes  -RS    Quadratus Lumborum  Bilateral:;Tender  -RS    Erector Spinae (Paraspinals)  Bilateral:;Tender  -RS       MMT (Manual Muscle Testing)    Rt Lower Ext  Rt Hip ABduction;Rt Hip Extension  -RS    Lt Lower Ext  Lt Hip Extension;Lt Hip ABduction  -RS       MMT Right Lower Ext    Rt Hip Extension MMT, Gross Movement  (4-/5) good minus  -RS    Rt Hip ABduction MMT, Gross Movement  (3+/5) fair plus  -RS       MMT Left Lower Ext    Lt Hip Extension MMT, Gross Movement  (3+/5) fair plus  -RS    Lt Hip ABduction MMT, Gross Movement  (3/5) fair  -RS       Sensation    Sensation WNL?  WNL  -RS       Gait/Stairs (Locomotion)    Comment (Gait/Stairs)  Pt ambulates with forward flexed posture, dec melissa, dec stance time LLE. She takes inc time to perform transitional positions and relies heavily on UE.  -RS      User Key  (r) = Recorded By, (t) = Taken By, (c) = Cosigned By    Initials Name Provider Type    RS  Ashley Velazquez PT Physical Therapist                      Therapy Education  Education Details: Access Code: [email or web address] Role of outpatient PT, POC, differential diagnosis, initial HEP, expectations, goals, anatomy.  Given: HEP  Program: New  How Provided: Verbal, Demonstration, Written  Provided to: Patient  Level of Understanding: Teach back education performed, Verbalized, Demonstrated     PT OP Goals     Row Name 12/18/20 1200          PT Short Term Goals    STG Date to Achieve  01/08/21  -RS     STG 1  The pt will demonstrate IND and compliant with initial HEP.  -RS     STG 1 Progress  New  -RS     STG 2  The pt will be able to walk into her house after working with no more than 5 min break in car after arriving home.  -RS     STG 2 Progress  New  -RS        Long Term Goals    LTG Date to Achieve  02/16/21  -RS     LTG 1  The pt will demonstrate IND and compliant with progressive HEP focused on IND condition management and return to PLOF.  -RS     LTG 1 Progress  New  -RS     LTG 2  The pt will score less than or equal to 45% disability on the modified Oswestry to indicate improved perceived performance of ADLs.  -RS     LTG 2 Progress  New  -RS     LTG 3  The pt will demonstrate understanding of safe lifting/bending mechanics for improved safety and decreased pain during work performance.  -RS     LTG 3 Progress  New  -RS     LTG 4  The pt will report pain no more than 7/10 with full day of work to facilitate improved work performance.  -RS     LTG 4 Progress  New  -RS     LTG 5  The pt will report at least 60% improvement in pain when first standing.walking after sitting to facilitate improved transitional position performance.  -RS     LTG 5 Progress  New  -RS        Time Calculation    PT Goal Re-Cert Due Date  03/18/21  -RS       User Key  (r) = Recorded By, (t) = Taken By, (c) = Cosigned By    Initials Name Provider Type    RS Ashley Velazquez, PT Physical Therapist          PT Assessment/Plan     Row  Name 12/18/20 1200          PT Assessment    Functional Limitations  Performance in work activities;Performance in self-care ADL;Performance in leisure activities;Limitations in functional capacity and performance;Limitation in home management;Impaired gait  -RS     Impairments  Balance;Endurance;Gait;Range of motion;Posture;Poor body mechanics;Pain;Muscle strength;Joint mobility  -RS     Assessment Comments  Christy L Verner is a 50 y.o. female referred to physical therapy for bilateral low back pain of chronic nature (1 year). She presents with an evolving clinical presentation, along with  comorbidities of GI issues which limit activity tolerance (hernia/ulcer per pt) and personal factors of dec functional activity tolerance, inc demands of job with lifting/prolonged standing that may impact her progress in the plan of care. Pt presents today with dec lumbar mobility into flexion and extension, dec B LE as well as hip strength, specifically LLE, inc TTP B paraspinal mm, altered gait pattern . her signs and symptoms are consistent with referring diagnosis. The previous impairments limit her ability to perform work demands, tolerate transitional positions, sleep without waking in pain. The pt self scores 64% disability on the RAVI (full disability=0%).Pt will benefit from skilled PT to address the previous impairments and return to PLOF.  -RS     Please refer to paper survey for additional self-reported information  Yes  -RS     Rehab Potential  Good  -RS     Patient/caregiver participated in establishment of treatment plan and goals  Yes  -RS     Patient would benefit from skilled therapy intervention  Yes  -RS        PT Plan    PT Frequency  2x/week  -RS     Predicted Duration of Therapy Intervention (PT)  6-8 weeks  -RS     Planned CPT's?  PT EVAL MOD COMPLELITY: 46624;PT RE-EVAL: 26776;PT THER PROC EA 15 MIN: 99228;PT THER ACT EA 15 MIN: 93811;PT MANUAL THERAPY EA 15 MIN: 40498;PT NEUROMUSC RE-EDUCATION EA 15  MIN: 98324;PT GAIT TRAINING EA 15 MIN: 01988;PT SELF CARE/HOME MGMT/TRAIN EA 15: 22871;PT HOT OR COLD PACK TREAT MCARE;PT ELECTRICAL STIM UNATTEND: ;PT TRACTION LUMBAR: 73960  -RS     PT Plan Comments  Assess tolerance for initial HEP, progress lumbar mobility as able, consider lumbopelvic stability, pt with poor tolerance for TA activation d/t GI issues.  -RS       User Key  (r) = Recorded By, (t) = Taken By, (c) = Cosigned By    Initials Name Provider Type    RS Ashley Velazquez, PT Physical Therapist            OP Exercises     Row Name 12/18/20 1200             Total Minutes    93912 - PT Therapeutic Exercise Minutes  8  -RS         Exercise 1    Exercise Name 1  LTR  -RS      Cueing 1  Verbal;Demo  -RS      Reps 1  10  -RS      Time 1  3s  -RS         Exercise 2    Exercise Name 2  SKTC  -RS      Cueing 2  Verbal;Demo  -RS      Reps 2  3  -RS      Time 2  20s  -RS         Exercise 3    Exercise Name 3  trialed PPT but pt reports makes her stomach hurt  -RS        User Key  (r) = Recorded By, (t) = Taken By, (c) = Cosigned By    Initials Name Provider Type    RS Ashley Velazquez, PT Physical Therapist                        Outcome Measure Options: Modifed Owestry  Modified Oswestry  Modified Oswestry Score/Comments: 64% disability      Time Calculation:     Start Time: 1215  Stop Time: 1245  Time Calculation (min): 30 min     Therapy Charges for Today     Code Description Service Date Service Provider Modifiers Qty    57172565716 HC PT EVAL LOW COMPLEXITY 1 12/18/2020 Ashley Velazquez, PT GP 1    25381934456 HC PT THER PROC EA 15 MIN 12/18/2020 Ashley Velazquez, PT GP 1          PT G-Codes  Outcome Measure Options: Modifed Owestry  Modified Oswestry Score/Comments: 64% disability         Ashley Velazquez PT  12/18/2020

## 2020-12-21 ENCOUNTER — APPOINTMENT (OUTPATIENT)
Dept: PHYSICAL THERAPY | Facility: HOSPITAL | Age: 50
End: 2020-12-21

## 2020-12-23 ENCOUNTER — TELEPHONE (OUTPATIENT)
Dept: GASTROENTEROLOGY | Facility: CLINIC | Age: 50
End: 2020-12-23

## 2020-12-23 NOTE — TELEPHONE ENCOUNTER
Patient can try Carafate 1 g with meals and at bedtime along with her PPI.  She can dissolve the Carafate and 10 cc of water to have both the esophagus and stomach effect.  She is scheduled for follow-up early in January.

## 2020-12-23 NOTE — TELEPHONE ENCOUNTER
"Call from pt.  States has a couple days left of h pylori rx (see note of 11/30).  States \"my stomach is just killing me - its just burning\".   Spoke with on call RN last night and was advised to switch to clear liquids and contact office this am.     States this is same pain she had that prompted EGD and above rx has not made any change in symptoms.  Denies fever.  Bowel pattern without problem.  Concerned re: persistent pain.     Advise will send message to DR Christian and may also contact MD's on call as needed.  Verb understanding.   "

## 2020-12-23 NOTE — TELEPHONE ENCOUNTER
VM to pt with request to contact office.     TIERA to  Pharmacy @ [phone] - Carafate per DR Christian order, #120, R3.

## 2020-12-29 ENCOUNTER — TELEPHONE (OUTPATIENT)
Dept: GASTROENTEROLOGY | Facility: CLINIC | Age: 50
End: 2020-12-29

## 2020-12-29 NOTE — TELEPHONE ENCOUNTER
----- Message from Merna Gomez sent at 12/29/2020  8:15 AM EST -----  Contact: [phone]  Patient is requesting a call back from RN

## 2020-12-29 NOTE — TELEPHONE ENCOUNTER
Would continue twice daily pantoprazole and change Carafate from tablet to suspension 1 g in 30 cc of water before meals and at bedtime.  We will see patient in follow-up at scheduled appointment.  JENNA.     **Call to [facility] and spoke with Hayden.  Orders as above: carafate 1200 ml, R0;  Pantoprazole #60, R0.   Call to pt.  Advise of above.  Verb understanding.  Advise pt that if carafate suspension too expensive - dissolve tabs in 10 ml water to make slurry.  Verb understanding  Katty Hammond RN.

## 2020-12-29 NOTE — TELEPHONE ENCOUNTER
"Call to pt.  States taking carafate as ordered 12/23 without relief.  \"My stomach hurts all day long\" - describes as belly ache and nausea.     Works in kitchen at [employer].  States has h/o hiatal hernia and lifting pots at work seems to greatly aggravate symptoms.      Denies fever.  Has completed h pylori tx.  Plans to keep 1/4 appt with DR Christian, but worried about persistent symptoms.     Message to DR Christian.   "

## 2021-01-04 ENCOUNTER — OFFICE VISIT (OUTPATIENT)
Dept: GASTROENTEROLOGY | Facility: CLINIC | Age: 51
End: 2021-01-04

## 2021-01-04 ENCOUNTER — TELEPHONE (OUTPATIENT)
Dept: GASTROENTEROLOGY | Facility: CLINIC | Age: 51
End: 2021-01-04

## 2021-01-04 VITALS — TEMPERATURE: 96.3 F | WEIGHT: 206.2 LBS | HEIGHT: 64 IN | BODY MASS INDEX: 35.2 KG/M2

## 2021-01-04 DIAGNOSIS — Z12.11 SCREENING FOR COLORECTAL CANCER: ICD-10-CM

## 2021-01-04 DIAGNOSIS — K59.00 CONSTIPATION, UNSPECIFIED CONSTIPATION TYPE: ICD-10-CM

## 2021-01-04 DIAGNOSIS — Z12.12 SCREENING FOR COLORECTAL CANCER: ICD-10-CM

## 2021-01-04 DIAGNOSIS — K21.9 GASTROESOPHAGEAL REFLUX DISEASE, UNSPECIFIED WHETHER ESOPHAGITIS PRESENT: ICD-10-CM

## 2021-01-04 DIAGNOSIS — R10.10 PAIN OF UPPER ABDOMEN: Primary | ICD-10-CM

## 2021-01-04 PROCEDURE — 99214 OFFICE O/P EST MOD 30 MIN: CPT | Performed by: INTERNAL MEDICINE

## 2021-01-04 NOTE — PROGRESS NOTES
"Chief Complaint   Patient presents with   • Follow-up     10/26/2020   • Nausea   • Abdominal Pain     Epigastric, Hernia   • Constipation        Christy L Verner is a  50 y.o. female here for a follow up visit for abdominal pain, constipation, nausea    HPI this 50-year-old -American female patient of Mary HOOKER presents in follow-up since her initial visit in October of last year as well as a upper endoscopy performed in November 2020.  That upper endoscopy did reveal a positive Helicobacter pylori infection she was treated for this.  Despite this treatment as well as use of Carafate and Protonix pump inhibitors her pain persists.  She does not seem to be responding to acid reduction measures.  Her pain seems to be exacerbated by physical activity especially at work.  She had been treated by her primary care tender with dicyclomine and has also been on Zanaflex and Robaxin.  Again she denies any significant symptomatic relief.  I advised we consider further evaluation and will start with an upper GI to see if there is a paraesophageal component to herniation.  We also talked about colonoscopic evaluation to rule that out as a potential pain source.  I did discuss the possible need for a gastric emptying study if problems with nausea persist.  She request a release form to reduce physical activity at work until her symptoms improve.    Past Medical History:   Diagnosis Date   • Abdominal pain     9/2020   • Anesthesia     \"SLOW TO AWAKEN\"-PT'S MOTHER TOOK 1 WEEK TO COME OUT OF ANESTHESIA   • Arthritis    • Disease of thyroid gland    • GERD (gastroesophageal reflux disease)    • Knee pain, left    • Left shoulder pain    • PONV (postoperative nausea and vomiting)    • Torn rotator cuff     LEFT       Current Outpatient Medications   Medication Sig Dispense Refill   • famotidine (Pepcid) 20 MG tablet Take 1 tablet by mouth 2 (Two) Times a Day As Needed for Indigestion or Heartburn. 60 tablet 0   • " methocarbamol (ROBAXIN) 500 MG tablet Take 1 tablet by mouth 4 (Four) Times a Day. 60 tablet 1   • oxyCODONE-acetaminophen (PERCOCET) 5-325 MG per tablet Take 1 tablet by mouth 2 (two) times a day as needed. 60 tablet 0   • pantoprazole (PROTONIX) 40 MG EC tablet Take 1 tablet by mouth 2 (two) times a day. 60 tablet 0   • sucralfate (CARAFATE) 1 g tablet Take 1 tablet by mouth 4 (Four) Times a Day With Meals & at Bedtime. 120 tablet 3   • tiZANidine (ZANAFLEX) 4 MG tablet Take 1 tablet by mouth At Night at bedtime As Needed. 30 tablet 0   • traZODone (DESYREL) 50 MG tablet Take 1-2 tablets by mouth Daily. 60 tablet 0   • dicyclomine (BENTYL) 20 MG tablet Take 1 tablet by mouth Every 6 (Six) Hours. 24 tablet 0   • ondansetron ODT (Zofran ODT) 4 MG disintegrating tablet Place 1 tablet on the tongue Every 8 (Eight) Hours As Needed for Nausea or Vomiting. 21 tablet 0     No current facility-administered medications for this visit.        PRN Meds:.    No Known Allergies    Social History     Socioeconomic History   • Marital status:      Spouse name: Not on file   • Number of children: Not on file   • Years of education: Not on file   • Highest education level: Not on file   Tobacco Use   • Smoking status: Former Smoker     Years: 7.00     Types: Cigarettes     Quit date: 1/1/2018     Years since quitting: 3.0   • Smokeless tobacco: Never Used   • Tobacco comment: 3-4 CIGARETTES PER DAY   Substance and Sexual Activity   • Alcohol use: No   • Drug use: No   • Sexual activity: Yes     Birth control/protection: Surgical       Family History   Problem Relation Age of Onset   • Rheum arthritis Mother    • Malig Hyperthermia Neg Hx        Review of Systems   Constitutional: Negative for activity change, appetite change, fatigue and unexpected weight change.   HENT: Negative for congestion, facial swelling, sore throat, trouble swallowing and voice change.    Eyes: Negative for photophobia and visual disturbance.    Respiratory: Negative for cough and choking.    Cardiovascular: Negative for chest pain.   Gastrointestinal: Positive for abdominal pain and nausea. Negative for abdominal distention, anal bleeding, blood in stool, constipation, diarrhea, rectal pain and vomiting.   Endocrine: Negative for polyphagia.   Musculoskeletal: Negative for arthralgias, gait problem and joint swelling.   Skin: Negative for color change, pallor and rash.   Allergic/Immunologic: Negative for food allergies.   Neurological: Negative for speech difficulty and headaches.   Hematological: Does not bruise/bleed easily.   Psychiatric/Behavioral: Negative for agitation, confusion and sleep disturbance.       Vitals:    01/04/21 1407   Temp: 96.3 °F (35.7 °C)       Physical Exam  Constitutional:       Appearance: She is well-developed.   HENT:      Head: Normocephalic.   Eyes:      Conjunctiva/sclera: Conjunctivae normal.   Neck:      Musculoskeletal: Normal range of motion.   Cardiovascular:      Rate and Rhythm: Normal rate and regular rhythm.   Pulmonary:      Breath sounds: Normal breath sounds.   Abdominal:      General: Bowel sounds are normal.      Palpations: Abdomen is soft.   Musculoskeletal: Normal range of motion.   Skin:     General: Skin is warm and dry.   Neurological:      Mental Status: She is alert and oriented to person, place, and time.   Psychiatric:         Behavior: Behavior normal.         ASSESSMENT  #1 upper abdominal pain: Work-up has included CT scan, upper endoscopy, and treatment regimens including antispasmodic, proton pump inhibitor, mucosal defense agent.  Despite this work-up she has not obtained symptomatic relief.  The pain does seem to be associated with physical activity.  #2 GERD  #3+ Helicobacter pylori: Treated  #4 constipation      PLAN  Schedule upper GI to assess for possible paraesophageal hernia  Schedule colonoscopy  Work release from heavy duty until work-up complete      ICD-10-CM ICD-9-CM   1. Pain  of upper abdomen  R10.10 789.09   2. Gastroesophageal reflux disease, unspecified whether esophagitis present  K21.9 530.81   3. Constipation, unspecified constipation type  K59.00 564.00

## 2021-01-06 ENCOUNTER — TRANSCRIBE ORDERS (OUTPATIENT)
Dept: SLEEP MEDICINE | Facility: HOSPITAL | Age: 51
End: 2021-01-06

## 2021-01-06 ENCOUNTER — HOSPITAL ENCOUNTER (OUTPATIENT)
Dept: PHYSICAL THERAPY | Facility: HOSPITAL | Age: 51
Setting detail: THERAPIES SERIES
Discharge: HOME | End: 2021-01-06

## 2021-01-06 ENCOUNTER — TELEPHONE (OUTPATIENT)
Dept: GASTROENTEROLOGY | Facility: CLINIC | Age: 51
End: 2021-01-06

## 2021-01-06 DIAGNOSIS — M54.50 CHRONIC BILATERAL LOW BACK PAIN WITHOUT SCIATICA: Primary | ICD-10-CM

## 2021-01-06 DIAGNOSIS — R26.2 DIFFICULTY WALKING: ICD-10-CM

## 2021-01-06 DIAGNOSIS — G89.29 CHRONIC BILATERAL LOW BACK PAIN WITHOUT SCIATICA: Primary | ICD-10-CM

## 2021-01-06 DIAGNOSIS — Z01.818 OTHER SPECIFIED PRE-OPERATIVE EXAMINATION: Primary | ICD-10-CM

## 2021-01-06 PROCEDURE — 97110 THERAPEUTIC EXERCISES: CPT | Performed by: PHYSICAL THERAPIST

## 2021-01-06 NOTE — THERAPY TREATMENT NOTE
"    Outpatient Physical Therapy Ortho Treatment Note  [facility]     Patient Name: Christy L Verner  : 1970  MRN: [id number]  Today's Date: 2021      Visit Date: 2021    Visit Dx:    ICD-10-CM ICD-9-CM   1. Chronic bilateral low back pain without sciatica  M54.5 724.2    G89.29 338.29   2. Difficulty walking  R26.2 719.7       Patient Active Problem List   Diagnosis   • Injury of left knee   • H/O thyroidectomy   • Menopause   • Complete tear of right rotator cuff   • Incomplete tear of left rotator cuff   • Traumatic incomplete tear of left rotator cuff   • Status post rotator cuff repair   • S/P rotator cuff repair   • Epigastric pain   • Nausea   • Pain of upper abdomen   • Constipation   • Screening for colorectal cancer        Past Medical History:   Diagnosis Date   • Abdominal pain     2020   • Anesthesia     \"SLOW TO AWAKEN\"-PT'S MOTHER TOOK 1 WEEK TO COME OUT OF ANESTHESIA   • Arthritis    • Disease of thyroid gland    • GERD (gastroesophageal reflux disease)    • Knee pain, left    • Left shoulder pain    • PONV (postoperative nausea and vomiting)    • Torn rotator cuff     LEFT        Past Surgical History:   Procedure Laterality Date   • APPENDECTOMY     • BUNIONECTOMY Bilateral    •  SECTION     • ENDOSCOPY N/A 2020    Procedure: ESOPHAGOGASTRODUODENOSCOPY WITH BIOPSIES;  Surgeon: Lele Christian MD;  Location: [facility];  Service: Gastroenterology;  Laterality: N/A;  PRE- REFLUX, EPIGASTRIC PAIN, NAUSEA  POST- HIATAL HERNIA, GASTRITIS   • HAND SURGERY Left    • KNEE ARTHROSCOPY Left 2018    Procedure: LEFT KNEE ARTHROSCOPY, EXTENSIVE CHONDROPLASTY, PARTIAL LATERAL MENISECTOMY;  Surgeon: Moe Martínez MD;  Location: [facility] OR [facility];  Service: Orthopedics   • KNEE MENISCAL REPAIR Left    • SHOULDER ARTHROSCOPY W/ ROTATOR CUFF REPAIR Left 2020    Procedure: SHOULDER ARTHROSCOPY WITH ROTATOR CUFF REPAIR, DEBRIDEMENT OF LABRUM, DECOMPRESSION;  " Surgeon: Leisa Patel MD;  [facility] [facility] [place] [facility] [place]  Service: Orthopedics;  Laterality: Left;   • SUBTOTAL HYSTERECTOMY     • TOTAL THYROIDECTOMY                         PT Assessment/Plan     Row Name 01/06/21 1310          PT Assessment    Assessment Comments  ms. Verner returns for her first follow up visit for her LBP since 12/18/2020. She reports she is scheduled for an ablation tomorrow. We worked on car transfer techniques, progressed hip girdle/core strengthening and updated HEP to reflect changes.  Ms. Verner continues to be a good candidate for skilled physical therapy .  -GJ        PT Plan    PT Plan Comments  assess response from new exercises and ablation.  COntinue to work on hip girdle/core strengthening,, work on functional mobililty  -GJ       User Key  (r) = Recorded By, (t) = Taken By, (c) = Cosigned By    Initials Name Provider Type    Jorge Bernal, PT Physical Therapist            OP Exercises     Row Name 01/06/21 1309 01/06/21 1300          Subjective Comments    Subjective Comments  --  my back is hurting, I have N/T down the L leg. I am having stomach pains too. I have a procedure to freeze the nerves in my back.  -GJ        Subjective Pain    Pre-Treatment Pain Level  --  6  -GJ        Total Minutes    10236 - PT Therapeutic Exercise Minutes  35  -GJ  --        Exercise 1    Exercise Name 1  --  LTR  -GJ     Cueing 1  --  Verbal;Demo  -GJ     Reps 1  --  10  -GJ     Time 1  --  5 s  -GJ        Exercise 2    Exercise Name 2  --  SKTC, used bolster at head to elevate to help with GI issues  -GJ     Cueing 2  --  Verbal;Demo  -GJ     Reps 2  --  3  -GJ     Time 2  --  20s  -GJ        Exercise 4    Exercise Name 4  --  Nustep, UE/LE  -GJ     Time 4  --  5 min  -GJ        Exercise 5    Exercise Name 5  --  shoulder ext with TA  -GJ     Cueing 5  --  Verbal;Demo  -GJ     Reps 5  --  15  -GJ     Additional Comments  --  RTB  -GJ        Exercise 6    Exercise Name 6  --  HL hip abd  -GJ      Cueing 6  --  Verbal;Demo  -GJ     Reps 6  --  15  -GJ     Time 6  --  3 s  -GJ     Additional Comments  --  RTB  -GJ        Exercise 7    Exercise Name 7  --  HL hip add  -GJ     Cueing 7  --  Verbal;Demo  -GJ     Reps 7  --  15  -GJ     Time 7  --  3 s  -GJ     Additional Comments  --  ball  -GJ        Exercise 8    Exercise Name 8  --  sit to/from stand (from elevated mat table) to simulate car transfers  -GJ     Cueing 8  --  Verbal;Demo  -GJ     Reps 8  --  10  -GJ     Additional Comments  --  cues for nose over toes  -GJ        Exercise 9    Exercise Name 9  --  SL clam, B  -GJ     Cueing 9  --  Verbal;Demo  -GJ     Reps 9  --  15  -GJ     Time 9  --  2s  -GJ        Exercise 10    Exercise Name 10  --  HS 90/90 with DF  -GJ     Cueing 10  --  Verbal;Demo  -GJ     Reps 10  --  10, each side  -GJ       User Key  (r) = Recorded By, (t) = Taken By, (c) = Cosigned By    Initials Name Provider Type    Jorge Bernal, PT Physical Therapist                       PT OP Goals     Row Name 01/06/21 1300          PT Short Term Goals    STG Date to Achieve  01/08/21  -GJ     STG 1  The pt will demonstrate IND and compliant with initial HEP.  -GJ     STG 1 Progress  Ongoing  -GJ     STG 1 Progress Comments  intermittent cues  -GJ     STG 2  The pt will be able to walk into her house after working with no more than 5 min break in car after arriving home.  -GJ     STG 2 Progress  Ongoing  -GJ     STG 2 Progress Comments  has to sit 15+ min. in the car  -GJ        Long Term Goals    LTG Date to Achieve  02/16/21  -GJ     LTG 1  The pt will demonstrate IND and compliant with progressive HEP focused on IND condition management and return to PLOF.  -GJ     LTG 1 Progress  Ongoing  -GJ     LTG 2  The pt will score less than or equal to 45% disability on the modified Oswestry to indicate improved perceived performance of ADLs.  -GJ     LTG 2 Progress  Ongoing  -GJ     LTG 3  The pt will demonstrate understanding of safe  lifting/bending mechanics for improved safety and decreased pain during work performance.  -GJ     LTG 3 Progress  Ongoing  -GJ     LTG 4  The pt will report pain no more than 7/10 with full day of work to facilitate improved work performance.  -GJ     LTG 4 Progress  Ongoing  -GJ     LTG 5  The pt will report at least 60% improvement in pain when first standing.walking after sitting to facilitate improved transitional position performance.  -GJ     LTG 5 Progress  Ongoing  -GJ       User Key  (r) = Recorded By, (t) = Taken By, (c) = Cosigned By    Initials Name Provider Type    Jorge Bernal, PT Physical Therapist          Therapy Education  Education Details: [id number] discussed/demonstrated car transfer techniques (2 feet out of car then stand, both feet out, ,then sit then bring legs in)  Given: HEP, Symptoms/condition management, Pain management, Mobility training, Posture/body mechanics  Program: New, Reinforced, Progressed  How Provided: Verbal, Demonstration, Written  Provided to: Patient  Level of Understanding: Teach back education performed, Verbalized, Demonstrated              Time Calculation:   Start Time: 1315  Stop Time: 1350  Time Calculation (min): 35 min  Therapy Charges for Today     Code Description Service Date Service Provider Modifiers Qty    [id number]  PT THER PROC EA 15 MIN 1/6/2021 Jorge Oro, PT GP 2                    Jorge Oro PT  1/6/2021

## 2021-01-06 NOTE — TELEPHONE ENCOUNTER
----- Message from Jasmeet Randall sent at 1/6/2021  8:24 AM EST -----  Regarding: Letter for work  Contact: [phone]  PT is calling regarding a letter for work from last office visit, please advise

## 2021-01-09 ENCOUNTER — LAB (OUTPATIENT)
Dept: LAB | Facility: HOSPITAL | Age: 51
End: 2021-01-09

## 2021-01-09 DIAGNOSIS — Z01.818 OTHER SPECIFIED PRE-OPERATIVE EXAMINATION: ICD-10-CM

## 2021-01-09 PROCEDURE — C9803 HOPD COVID-19 SPEC COLLECT: HCPCS

## 2021-01-09 PROCEDURE — U0004 COV-19 TEST NON-CDC HGH THRU: HCPCS

## 2021-01-11 LAB — SARS-COV-2 RNA RESP QL NAA+PROBE: NOT DETECTED

## 2021-01-12 ENCOUNTER — ANESTHESIA (OUTPATIENT)
Dept: GASTROENTEROLOGY | Facility: HOSPITAL | Age: 51
End: 2021-01-12

## 2021-01-12 ENCOUNTER — HOSPITAL ENCOUNTER (OUTPATIENT)
Facility: HOSPITAL | Age: 51
Setting detail: HOSPITAL OUTPATIENT SURGERY
Discharge: HOME | End: 2021-01-12
Attending: INTERNAL MEDICINE | Admitting: INTERNAL MEDICINE

## 2021-01-12 ENCOUNTER — ANESTHESIA EVENT (OUTPATIENT)
Dept: GASTROENTEROLOGY | Facility: HOSPITAL | Age: 51
End: 2021-01-12

## 2021-01-12 VITALS
WEIGHT: 202 LBS | DIASTOLIC BLOOD PRESSURE: 85 MMHG | BODY MASS INDEX: 34.49 KG/M2 | SYSTOLIC BLOOD PRESSURE: 128 MMHG | OXYGEN SATURATION: 98 % | RESPIRATION RATE: 20 BRPM | HEIGHT: 64 IN | HEART RATE: 80 BPM | TEMPERATURE: 98.2 F

## 2021-01-12 DIAGNOSIS — K59.00 CONSTIPATION, UNSPECIFIED CONSTIPATION TYPE: ICD-10-CM

## 2021-01-12 DIAGNOSIS — Z12.11 SCREENING FOR COLORECTAL CANCER: ICD-10-CM

## 2021-01-12 DIAGNOSIS — R10.10 PAIN OF UPPER ABDOMEN: ICD-10-CM

## 2021-01-12 DIAGNOSIS — Z12.12 SCREENING FOR COLORECTAL CANCER: ICD-10-CM

## 2021-01-12 PROCEDURE — 0: Performed by: INTERNAL MEDICINE

## 2021-01-12 PROCEDURE — 25010000002 PROPOFOL 10 MG/ML EMULSION: Performed by: NURSE ANESTHETIST, CERTIFIED REGISTERED

## 2021-01-12 PROCEDURE — 88305 TISSUE EXAM BY PATHOLOGIST: CPT | Performed by: INTERNAL MEDICINE

## 2021-01-12 PROCEDURE — C1889 IMPLANT/INSERT DEVICE, NOC: HCPCS | Performed by: INTERNAL MEDICINE

## 2021-01-12 PROCEDURE — S0260 H&P FOR SURGERY: HCPCS | Performed by: INTERNAL MEDICINE

## 2021-01-12 PROCEDURE — 45385 COLONOSCOPY W/LESION REMOVAL: CPT | Performed by: INTERNAL MEDICINE

## 2021-01-12 DEVICE — DEV CLIP ENDO RESOLUTION360 CONTRL ROT 235CM: Type: IMPLANTABLE DEVICE | Site: DESCENDING COLON | Status: FUNCTIONAL

## 2021-01-12 RX ORDER — GLYCOPYRROLATE 0.2 MG/ML
INJECTION INTRAMUSCULAR; INTRAVENOUS AS NEEDED
Status: DISCONTINUED | OUTPATIENT
Start: 2021-01-12 | End: 2021-01-12 | Stop reason: SURG

## 2021-01-12 RX ORDER — PROPOFOL 10 MG/ML
VIAL (ML) INTRAVENOUS AS NEEDED
Status: DISCONTINUED | OUTPATIENT
Start: 2021-01-12 | End: 2021-01-12 | Stop reason: SURG

## 2021-01-12 RX ORDER — SODIUM CHLORIDE, SODIUM LACTATE, POTASSIUM CHLORIDE, CALCIUM CHLORIDE 600; 310; 30; 20 MG/100ML; MG/100ML; MG/100ML; MG/100ML
30 INJECTION, SOLUTION INTRAVENOUS CONTINUOUS PRN
Status: DISCONTINUED | OUTPATIENT
Start: 2021-01-12 | End: 2021-01-12 | Stop reason: HOSPADM

## 2021-01-12 RX ORDER — SODIUM CHLORIDE 0.9 % (FLUSH) 0.9 %
10 SYRINGE (ML) INJECTION AS NEEDED
Status: DISCONTINUED | OUTPATIENT
Start: 2021-01-12 | End: 2021-01-12 | Stop reason: HOSPADM

## 2021-01-12 RX ORDER — SODIUM CHLORIDE 0.9 % (FLUSH) 0.9 %
3 SYRINGE (ML) INJECTION EVERY 12 HOURS SCHEDULED
Status: DISCONTINUED | OUTPATIENT
Start: 2021-01-12 | End: 2021-01-12 | Stop reason: HOSPADM

## 2021-01-12 RX ORDER — PROPOFOL 10 MG/ML
VIAL (ML) INTRAVENOUS CONTINUOUS PRN
Status: DISCONTINUED | OUTPATIENT
Start: 2021-01-12 | End: 2021-01-12 | Stop reason: SURG

## 2021-01-12 RX ORDER — LIDOCAINE HYDROCHLORIDE 20 MG/ML
INJECTION, SOLUTION INFILTRATION; PERINEURAL AS NEEDED
Status: DISCONTINUED | OUTPATIENT
Start: 2021-01-12 | End: 2021-01-12 | Stop reason: SURG

## 2021-01-12 RX ORDER — PROMETHAZINE HYDROCHLORIDE 25 MG/1
25 SUPPOSITORY RECTAL ONCE AS NEEDED
Status: DISCONTINUED | OUTPATIENT
Start: 2021-01-12 | End: 2021-01-12 | Stop reason: HOSPADM

## 2021-01-12 RX ORDER — PROMETHAZINE HYDROCHLORIDE 25 MG/1
25 TABLET ORAL ONCE AS NEEDED
Status: DISCONTINUED | OUTPATIENT
Start: 2021-01-12 | End: 2021-01-12 | Stop reason: HOSPADM

## 2021-01-12 RX ADMIN — PROPOFOL 100 MG: 10 INJECTION, EMULSION INTRAVENOUS at 13:40

## 2021-01-12 RX ADMIN — GLYCOPYRROLATE 0.1 MG: 0.2 INJECTION INTRAMUSCULAR; INTRAVENOUS at 13:35

## 2021-01-12 RX ADMIN — LIDOCAINE HYDROCHLORIDE 60 MG: 20 INJECTION, SOLUTION INFILTRATION; PERINEURAL at 13:40

## 2021-01-12 RX ADMIN — SODIUM CHLORIDE, POTASSIUM CHLORIDE, SODIUM LACTATE AND CALCIUM CHLORIDE 30 ML/HR: 600; 310; 30; 20 INJECTION, SOLUTION INTRAVENOUS at 13:05

## 2021-01-12 RX ADMIN — PROPOFOL 200 MCG/KG/MIN: 10 INJECTION, EMULSION INTRAVENOUS at 13:42

## 2021-01-12 NOTE — ANESTHESIA POSTPROCEDURE EVALUATION
"Patient: Christy L Verner    Procedure Summary     Date: 01/12/21 [facility] / [facility]  [facility]    Anesthesia Start: 1335 Anesthesia Stop: 1407    Procedure: COLONOSCOPY into cecum and normal terminal ileum with hot snare polypectomy and clip placementx1 (N/A ) Diagnosis:       Polyp of colon      Hemorrhoids      (Pain of upper abdomen [R10.10])      (Constipation, unspecified constipation type [K59.00])      (Screening for colorectal cancer [Z12.11, Z12.12])    Surgeon: Lele Christian MD Provider: Lg Almanzar MD    Anesthesia Type: MAC ASA Status: 3          Anesthesia Type: MAC    Vitals  No vitals data found for the desired time range.          Post Anesthesia Care and Evaluation    Patient location during evaluation: bedside  Patient participation: complete - patient participated  Level of consciousness: awake and alert  Pain management: adequate  Airway patency: patent  Anesthetic complications: No anesthetic complications    Cardiovascular status: acceptable  Respiratory status: acceptable  Hydration status: acceptable    Comments: /87 (BP Location: Left arm, Patient Position: Lying)   Pulse 81   Temp 36.8 °C (98.2 °F) (Oral)   Resp 18   Ht 162.6 cm (64\")   Wt 91.6 kg (202 lb)   SpO2 99%   BMI 34.67 kg/m²       "

## 2021-01-12 NOTE — ANESTHESIA PREPROCEDURE EVALUATION
Anesthesia Evaluation     Patient summary reviewed and Nursing notes reviewed   history of anesthetic complications: PONV               Airway   Mallampati: I  TM distance: >3 FB  Neck ROM: full  No difficulty expected  Dental - normal exam     Pulmonary - normal exam   (+) a smoker Former,   Cardiovascular - negative cardio ROS and normal exam        Neuro/Psych- negative ROS  GI/Hepatic/Renal/Endo    (+)  GERD,      Musculoskeletal     Abdominal  - normal exam    Bowel sounds: normal.   Substance History - negative use     OB/GYN negative ob/gyn ROS         Other   arthritis,                    Anesthesia Plan    ASA 3     MAC       Anesthetic plan, all risks, benefits, and alternatives have been provided, discussed and informed consent has been obtained with: patient.

## 2021-01-12 NOTE — H&P
"[facility]  Pre Procedure History & Physical    Chief Complaint:   Abdominal pain    Subjective     HPI:   This 50-year-old female presents the endoscopy suite for colonoscopic assessment.  She has had a history of abdominal pain with negative upper endoscopic findings.  No prior colonoscopy of record.    Past Medical History:   Past Medical History:   Diagnosis Date   • Abdominal pain     2020   • Anesthesia     \"SLOW TO AWAKEN\"-PT'S MOTHER TOOK 1 WEEK TO COME OUT OF ANESTHESIA   • Arthritis    • Disease of thyroid gland    • GERD (gastroesophageal reflux disease)    • Knee pain, left    • Left shoulder pain    • PONV (postoperative nausea and vomiting)    • Torn rotator cuff     LEFT       Past Surgical History:  Past Surgical History:   Procedure Laterality Date   • APPENDECTOMY     • BUNIONECTOMY Bilateral    •  SECTION     • ENDOSCOPY N/A 2020    Procedure: ESOPHAGOGASTRODUODENOSCOPY WITH BIOPSIES;  Surgeon: Lele Christian MD;  Location: [facility] END[facility]COPY;  Service: Gastroenterology;  Laterality: N/A;  PRE- REFLUX, EPIGASTRIC PAIN, NAUSEA  POST- HIATAL HERNIA, GASTRITIS   • HAND SURGERY Left    • KNEE ARTHROSCOPY Left 2018    Procedure: LEFT KNEE ARTHROSCOPY, EXTENSIVE CHONDROPLASTY, PARTIAL LATERAL MENISECTOMY;  Surgeon: Moe Martínez MD;  Location: [facility] OR [facility];  Service: Orthopedics   • KNEE MENISCAL REPAIR Left    • SHOULDER ARTHROSCOPY W/ ROTATOR CUFF REPAIR Left 2020    Procedure: SHOULDER ARTHROSCOPY WITH ROTATOR CUFF REPAIR, DEBRIDEMENT OF LABRUM, DECOMPRESSION;  Surgeon: Leisa Patel MD;  Location: [facility] OR [facility];  Service: Orthopedics;  Laterality: Left;   • SUBTOTAL HYSTERECTOMY     • TOTAL THYROIDECTOMY         Family History:  Family History   Problem Relation Age of Onset   • Rheum arthritis Mother    • Malig Hyperthermia Neg Hx        Social History:   reports that she quit smoking about 3 years ago. Her smoking use included " cigarettes. She quit after 7.00 years of use. She has never used smokeless tobacco. She reports that she does not drink alcohol or use drugs.    Medications:   Medications Prior to Admission   Medication Sig Dispense Refill Last Dose   • dicyclomine (BENTYL) 20 MG tablet Take 1 tablet by mouth Every 6 (Six) Hours. 24 tablet 0    • famotidine (Pepcid) 20 MG tablet Take 1 tablet by mouth 2 (Two) Times a Day As Needed for Indigestion or Heartburn. 60 tablet 0    • methocarbamol (ROBAXIN) 500 MG tablet Take 1 tablet by mouth 4 (Four) Times a Day. 60 tablet 1    • ondansetron ODT (Zofran ODT) 4 MG disintegrating tablet Place 1 tablet on the tongue Every 8 (Eight) Hours As Needed for Nausea or Vomiting. 21 tablet 0    • oxyCODONE-acetaminophen (PERCOCET) 5-325 MG per tablet Take 1 tablet by mouth 2 (Two) Times a Day As Needed. 60 tablet 0    • pantoprazole (PROTONIX) 40 MG EC tablet Take 1 tablet by mouth 2 (two) times a day. 60 tablet 0    • sucralfate (CARAFATE) 1 g tablet Take 1 tablet by mouth 4 (Four) Times a Day With Meals & at Bedtime. 120 tablet 3    • tiZANidine (ZANAFLEX) 4 MG tablet Take 1 tablet by mouth At Night at bedtime As Needed. 30 tablet 0    • traZODone (DESYREL) 50 MG tablet Take 1-2 tablets by mouth Daily. (Patient taking differently: Take  mg by mouth Every Night.) 60 tablet 0        Allergies:  Patient has no known allergies.    ROS:    Pertinent items are noted in HPI, all other systems reviewed and negative     Objective     There were no vitals taken for this visit.    Physical Exam   Constitutional: Pt is oriented to person, place, and time and well-developed, well-nourished, and in no distress.   Mouth/Throat: Oropharynx is clear and moist.   Neck: Normal range of motion.   Cardiovascular: Normal rate, regular rhythm and normal heart sounds.    Pulmonary/Chest: Effort normal and breath sounds normal.   Abdominal: Soft. Nontender  Skin: Skin is warm and dry.   Psychiatric: Mood, memory,  affect and judgment normal.     Assessment/Plan     Diagnosis:  Abdominal pain    Anticipated Surgical Procedure:  Colonoscopy    The risks, benefits, and alternatives of this procedure have been discussed with the patient or the responsible party- the patient understands and agrees to proceed.

## 2021-01-13 ENCOUNTER — APPOINTMENT (OUTPATIENT)
Dept: PHYSICAL THERAPY | Facility: HOSPITAL | Age: 51
End: 2021-01-13

## 2021-01-13 LAB
LAB AP CASE REPORT: NORMAL
LAB AP DIAGNOSIS COMMENT: NORMAL
PATH REPORT.FINAL DX SPEC: NORMAL
PATH REPORT.GROSS SPEC: NORMAL

## 2021-01-14 ENCOUNTER — TRANSCRIBE ORDERS (OUTPATIENT)
Dept: ADMINISTRATIVE | Facility: HOSPITAL | Age: 51
End: 2021-01-14

## 2021-01-14 ENCOUNTER — TELEPHONE (OUTPATIENT)
Dept: GASTROENTEROLOGY | Facility: CLINIC | Age: 51
End: 2021-01-14

## 2021-01-14 DIAGNOSIS — Z01.818 OTHER SPECIFIED PRE-OPERATIVE EXAMINATION: Primary | ICD-10-CM

## 2021-01-14 NOTE — TELEPHONE ENCOUNTER
----- Message from Christy L. Verner sent at 1/14/2021  9:16 AM EST -----  Regarding: Test Results Question  Contact: [phone]  I have a question about COLONOSCOPY resulted on 1/12/21, 1:25 PM.  They want let me pull up it to review my test results

## 2021-01-14 NOTE — TELEPHONE ENCOUNTER
Path from pedunculated polyp in the colon consistent with adenomatous polyp.  It was removed in its entirety, therefore, would offer follow-up colonoscopy in 3 years to determine if any new polyps have developed.  MVG.     **Call to pt.  Advise of above.  Verb understanding. C/s for 1/12/24 placed in recall and HM.  Katty العلي RN.

## 2021-01-19 ENCOUNTER — LAB (OUTPATIENT)
Dept: LAB | Facility: HOSPITAL | Age: 51
End: 2021-01-19

## 2021-01-19 DIAGNOSIS — Z01.818 OTHER SPECIFIED PRE-OPERATIVE EXAMINATION: ICD-10-CM

## 2021-01-19 PROCEDURE — U0004 COV-19 TEST NON-CDC HGH THRU: HCPCS

## 2021-01-19 PROCEDURE — C9803 HOPD COVID-19 SPEC COLLECT: HCPCS

## 2021-01-20 ENCOUNTER — HOSPITAL ENCOUNTER (OUTPATIENT)
Dept: PHYSICAL THERAPY | Facility: HOSPITAL | Age: 51
Setting detail: THERAPIES SERIES
Discharge: HOME | End: 2021-01-20

## 2021-01-20 DIAGNOSIS — M54.50 CHRONIC BILATERAL LOW BACK PAIN WITHOUT SCIATICA: Primary | ICD-10-CM

## 2021-01-20 DIAGNOSIS — G89.29 CHRONIC BILATERAL LOW BACK PAIN WITHOUT SCIATICA: Primary | ICD-10-CM

## 2021-01-20 DIAGNOSIS — R26.2 DIFFICULTY WALKING: ICD-10-CM

## 2021-01-20 LAB — SARS-COV-2 RNA RESP QL NAA+PROBE: NOT DETECTED

## 2021-01-20 PROCEDURE — 97110 THERAPEUTIC EXERCISES: CPT | Performed by: PHYSICAL THERAPIST

## 2021-01-20 NOTE — THERAPY TREATMENT NOTE
"    Outpatient Physical Therapy Ortho Treatment Note  [facility]     Patient Name: Christy L Verner  : 1970  MRN: [id number]  Today's Date: 2021      Visit Date: 2021    Visit Dx:    ICD-10-CM ICD-9-CM   1. Chronic bilateral low back pain without sciatica  M54.5 724.2    G89.29 338.29   2. Difficulty walking  R26.2 719.7       Patient Active Problem List   Diagnosis   • Injury of left knee   • H/O thyroidectomy   • Menopause   • Complete tear of right rotator cuff   • Incomplete tear of left rotator cuff   • Traumatic incomplete tear of left rotator cuff   • Status post rotator cuff repair   • S/P rotator cuff repair   • Epigastric pain   • Nausea   • Pain of upper abdomen   • Constipation   • Screening for colorectal cancer        Past Medical History:   Diagnosis Date   • Abdominal pain     2020   • Anesthesia     \"SLOW TO AWAKEN\"-PT'S MOTHER TOOK 1 WEEK TO COME OUT OF ANESTHESIA   • Arthritis    • Disease of thyroid gland    • GERD (gastroesophageal reflux disease)    • Knee pain, left    • Left shoulder pain    • PONV (postoperative nausea and vomiting)    • Torn rotator cuff     LEFT        Past Surgical History:   Procedure Laterality Date   • APPENDECTOMY     • BUNIONECTOMY Bilateral    •  SECTION     • COLONOSCOPY N/A 2021    Procedure: COLONOSCOPY into cecum and normal terminal ileum with hot snare polypectomy and clip placementx1;  Surgeon: Lele Christian MD;  Location: [facility] ENDOSCOPY;  Service: Gastroenterology;  Laterality: N/A;  pre: abd pain  post:polyp, hemorrhoids   • ENDOSCOPY N/A 2020    Procedure: ESOPHAGOGASTRODUODENOSCOPY WITH BIOPSIES;  Surgeon: Lele Christian MD;  Location: [facility];  Service: Gastroenterology;  Laterality: N/A;  PRE- REFLUX, EPIGASTRIC PAIN, NAUSEA  POST- HIATAL HERNIA, GASTRITIS   • HAND SURGERY Left    • KNEE ARTHROSCOPY Left 2018    Procedure: LEFT KNEE ARTHROSCOPY, EXTENSIVE CHONDROPLASTY, PARTIAL " LATERAL MENISECTOMY;  Surgeon: Moe Martínez MD;  [place] [facility] [place] [facility] [place]  Service: [place]opedics   • KNEE MENISCAL REPAIR Left    • SHOULDER ARTHROSCOPY W/ ROTATOR CUFF REPAIR Left 2/24/2020    Procedure: SHOULDER ARTHROSCOPY WITH ROTATOR CUFF REPAIR, DEBRIDEMENT OF LABRUM, DECOMPRESSION;  Surgeon: Leisa Patel MD;  [place] [facility] [place] [facility] [place]  Service: Orthopedics;  Laterality: Left;   • SUBTOTAL HYSTERECTOMY     • TOTAL THYROIDECTOMY                         PT Assessment/Plan     Row Name 01/20/21 0954          PT Assessment    Assessment Comments  Ms. Verner returns after having an ablation to her lumbar spine 2 weeks ago, which did not help per her report. She is scheduled to have one on the other side in 2 weeks.  We were able to progress strengthening activities today with no apparent difficulty.  I offered manual work to L hip girdle tissue but she denied.  Recommended that she prop one foot on a stool with prolonged standing activities.  Ms. Verner continues to be a good candidate for skilled physical therapy.  -GJ        PT Plan    PT Plan Comments  continue to progress hip girdle/core strengthening activities.  -GJ       User Key  (r) = Recorded By, (t) = Taken By, (c) = Cosigned By    Initials Name Provider Type    GJ Jorge Oro, PT Physical Therapist            OP Exercises     Row Name 01/20/21 0918 01/20/21 0900          Subjective Comments    Subjective Comments  --  The ablation didn't help at all, I got it 2 weeks ago. Pain is both sides low back   -GJ        Subjective Pain    Pre-Treatment Pain Level  --  7  -GJ        Total Minutes    64412 - PT Therapeutic Exercise Minutes  38  -GJ  --        Exercise 1    Exercise Name 1  --  LTR  -GJ     Cueing 1  --  Verbal;Demo  -GJ     Reps 1  --  10  -GJ     Time 1  --  5 s  -GJ        Exercise 2    Exercise Name 2  --  piriforms stretch  -GJ     Cueing 2  --  Verbal;Demo  -GJ     Reps 2  --  3, B  -GJ     Time 2  --  20 s  -GJ        Exercise 4     Exercise Name 4  --  Nustep, UE/LE  -GJ     Time 4  --  5 min  -GJ        Exercise 5    Exercise Name 5  --  shoulder ext with TA  -GJ     Cueing 5  --  Verbal;Demo  -GJ     Reps 5  --  15  -GJ     Additional Comments  --  GTB  -GJ        Exercise 6    Exercise Name 6  --  HL hip abd  -GJ     Cueing 6  --  Verbal;Demo  -GJ     Reps 6  --  15  -GJ     Time 6  --  3 s  -GJ     Additional Comments  --  GTB  -GJ        Exercise 7    Exercise Name 7  --  HL hip add  -GJ     Cueing 7  --  Verbal;Demo  -GJ     Reps 7  --  15  -GJ     Time 7  --  3 s  -GJ     Additional Comments  --  ball  -GJ        Exercise 8    Exercise Name 8  --  sit to/from stand standard chair, no hands  -GJ     Cueing 8  --  Verbal;Demo  -GJ     Reps 8  --  10  -GJ        Exercise 9    Exercise Name 9  --  SL clam, B  -GJ     Cueing 9  --  Verbal;Demo  -GJ     Reps 9  --  15  -GJ     Time 9  --  2s  -GJ        Exercise 10    Exercise Name 10  --  HS 90/90 with DF  -GJ     Cueing 10  --  Verbal;Demo  -GJ     Reps 10  --  10, each side  -GJ        Exercise 11    Exercise Name 11  --  lateral stepping  -GJ     Cueing 11  --  Verbal;Demo  -GJ     Reps 11  --  3 laps  -GJ     Additional Comments  --  RTB  -GJ        Exercise 12    Exercise Name 12  --  standign AR  -GJ     Cueing 12  --  Verbal;Demo  -GJ     Time 12  --  10, B  -GJ     Additional Comments  --  RTB  -GJ       User Key  (r) = Recorded By, (t) = Taken By, (c) = Cosigned By    Initials Name Provider Type    Jorge Bernal, PT Physical Therapist                      Manual Rx (last 36 hours)      Manual Treatments     Row Name 01/20/21 0900             Manual Rx 1    Manual Rx 1 Location  offered manual therapy to L hip girdle tissues, but she denied  -GJ        User Key  (r) = Recorded By, (t) = Taken By, (c) = Cosigned By    Initials Name Provider Type    Jorge Bernal, PT Physical Therapist          PT OP Goals     Row Name 01/20/21 0900          PT Short Term Goals    STG Date  to Achieve  01/08/21  -GJ     STG 1  The pt will demonstrate IND and compliant with initial HEP.  -GJ     STG 1 Progress  Ongoing  -GJ     STG 1 Progress Comments  intermittent cuing  -GJ     STG 2  The pt will be able to walk into her house after working with no more than 5 min break in car after arriving home.  -GJ     STG 2 Progress  Partially Met  -GJ     STG 2 Progress Comments  pt reports  is driving and she doesn't need to take a break, but she does pull on the handle to help  -GJ        Long Term Goals    LTG Date to Achieve  02/16/21  -GJ     LTG 1  The pt will demonstrate IND and compliant with progressive HEP focused on IND condition management and return to PLOF.  -GJ     LTG 1 Progress  Ongoing  -GJ     LTG 2  The pt will score less than or equal to 45% disability on the modified Oswestry to indicate improved perceived performance of ADLs.  -GJ     LTG 2 Progress  Ongoing  -GJ     LTG 3  The pt will demonstrate understanding of safe lifting/bending mechanics for improved safety and decreased pain during work performance.  -GJ     LTG 3 Progress  Ongoing  -GJ     LTG 4  The pt will report pain no more than 7/10 with full day of work to facilitate improved work performance.  -GJ     LTG 4 Progress  Ongoing  -GJ     LTG 4 Progress Comments  7/10 pain today  -GJ     LTG 5  The pt will report at least 60% improvement in pain when first standing.walking after sitting to facilitate improved transitional position performance.  -GJ     LTG 5 Progress  Ongoing  -GJ       User Key  (r) = Recorded By, (t) = Taken By, (c) = Cosigned By    Initials Name Provider Type    Jorge Bernal, PT Physical Therapist          Therapy Education  Education Details: discussed ergonomics with standing at work, trying to prop one foot on a stool during prolonged periods of standing  Given: HEP, Symptoms/condition management, Pain management, Mobility training, Posture/body mechanics  Program: New, Reinforced  How  Provided: Verbal, Demonstration  Provided to: Patient  Level of Understanding: Teach back education performed, Verbalized, Demonstrated              Time Calculation:   Start Time: 0917  Stop Time: 0955  Time Calculation (min): 38 min  Therapy Charges for Today     Code Description Service Date Service Provider Modifiers Qty    37406079661 HC PT THER PROC EA 15 MIN 1/20/2021 Jorge Oro, PT GP 3                    Jorge Oro, PT  1/20/2021

## 2021-01-21 ENCOUNTER — HOSPITAL ENCOUNTER (OUTPATIENT)
Dept: GENERAL RADIOLOGY | Facility: HOSPITAL | Age: 51
Discharge: HOME | End: 2021-01-21
Admitting: INTERNAL MEDICINE

## 2021-01-21 DIAGNOSIS — K21.9 GASTROESOPHAGEAL REFLUX DISEASE, UNSPECIFIED WHETHER ESOPHAGITIS PRESENT: ICD-10-CM

## 2021-01-21 DIAGNOSIS — R10.10 PAIN OF UPPER ABDOMEN: ICD-10-CM

## 2021-01-21 PROCEDURE — 74240 X-RAY XM UPR GI TRC 1CNTRST: CPT

## 2021-01-21 RX ADMIN — BARIUM SULFATE 183 ML: 960 POWDER, FOR SUSPENSION ORAL at 08:15

## 2021-01-26 ENCOUNTER — TELEPHONE (OUTPATIENT)
Dept: GASTROENTEROLOGY | Facility: CLINIC | Age: 51
End: 2021-01-26

## 2021-01-26 NOTE — TELEPHONE ENCOUNTER
Okay for refills on Protonix twice daily and Zofran every 8 hours as needed.  She will also need to schedule a gastric emptying study to measure stomach performance.

## 2021-01-26 NOTE — TELEPHONE ENCOUNTER
Message sent to Dr Christian regarding refilling of pantoprazole and zofran.  Also pt's ugi results.

## 2021-01-26 NOTE — TELEPHONE ENCOUNTER
----- Message from Jasmeet Schwartz sent at 1/26/2021  8:10 AM EST -----  Regarding: med refill  Contact: [phone]  Pt is checking on status of test results and refills on 2 medications. Please advise pt with test results if available.     pantoprazole (PROTONIX) 40 MG EC tablet 60 tablet 0 12/29/2020    Sig - Route: Take 1 tablet by mouth 2 (two) times a day. - Oral   Pharmacy    ondansetron ODT (Zofran ODT) 4 MG disintegrating tablet 21 tablet 0 9/28/2020    Sig - Route: Place 1 tablet on the tongue Every 8 (Eight) Hours As Needed for Nausea or Vomiting. - Translingual   Sent to pharmacy as: Ondansetron 4 MG Oral Tablet Disintegrating   Pharmacy    [facility]

## 2021-01-27 ENCOUNTER — TELEPHONE (OUTPATIENT)
Dept: GASTROENTEROLOGY | Facility: CLINIC | Age: 51
End: 2021-01-27

## 2021-01-27 DIAGNOSIS — R10.10 PAIN OF UPPER ABDOMEN: Primary | ICD-10-CM

## 2021-01-27 NOTE — TELEPHONE ENCOUNTER
----- Message from Jasmeet Liu sent at 1/27/2021 10:41 AM EST -----  Regarding: results  Contact: [phone]  Pt left a message regarding her results.

## 2021-01-28 RX ORDER — PANTOPRAZOLE SODIUM 40 MG/1
40 TABLET, DELAYED RELEASE ORAL 2 TIMES DAILY
Qty: 60 TABLET | Refills: 5 | Status: SHIPPED | OUTPATIENT
Start: 2021-01-28 | End: 2021-01-29

## 2021-01-28 RX ORDER — ONDANSETRON 4 MG/1
4 TABLET, ORALLY DISINTEGRATING ORAL EVERY 8 HOURS PRN
Qty: 21 TABLET | Refills: 0 | Status: SHIPPED | OUTPATIENT
Start: 2021-01-28 | End: 2021-02-17 | Stop reason: SDUPTHER

## 2021-01-28 NOTE — TELEPHONE ENCOUNTER
"Call to pt.  Advise per Dr Christian note.  Verb understanding.     Tearful.  States stomach continues to hurt.  \"what's causing this?\".     Advise per note of 1/26 that will have GES to measure stomach performance.  [employer] [facility] will contact to arrange.  Verb understanding.     GES order placed - message to DR Christian.    "

## 2021-01-29 RX ORDER — PANTOPRAZOLE SODIUM 40 MG/1
40 TABLET, DELAYED RELEASE ORAL 2 TIMES DAILY
Qty: 60 TABLET | Refills: 0 | Status: SHIPPED | OUTPATIENT
Start: 2021-01-29 | End: 2021-07-07 | Stop reason: SDUPTHER

## 2021-02-01 NOTE — PROGRESS NOTES
"Shoulder Follow Up      Patient: Christy L Verner        YOB: 1970            Chief Complaints: Shoulder pain right and left      History of Present Illness: Patient is here follow-up of bilateral shoulder pain the left 1 we fixed she had a rotator cuff tear we fix is approximately 1 year ago she states it is doing great she is happy where she is she has a known rotator cuff tear on the right she wishes to proceed with repair of that.      Physical Exam: 50 y.o. female  General Appearance:    Alert, cooperative, in no acute distress                   Vitals:    02/02/21 1338   Temp: 97.4 °F (36.3 °C)   Weight: 91.6 kg (202 lb)   Height: 162.6 cm (64\")   PainSc:   8        Patient is alert and read ×3 no acute distress appears her above-listed at height weight and age.  Affect is normal respiratory rate is normal unlabored. Heart rate regular rate rhythm, sclera, dentition and hearing are normal for the purpose of this exam.      Ortho Exam    Physical exam of the right shoulder reveals no overlying skin changes no lymphedema no lymphadenopathy.  Patient has active flexion 180 with mild symptoms abduction is similar external rotation is to 50 and internal rotation to the upper lumbar spine with mild symptoms.  Patient has good rotator cuff strength 4+ over 5 with isometric strength testing with pain.  Patient has a positive impingement and a positive De La Torre sign.  Patient has good cervical range of motion which is full and asymptomatic no radicular symptoms.  Patient has a normal elbow exam.  Good distal pulses are present  Patient has pain with overhead activity and a positive Neer sign and a positive empty can sign , a positive drop arm and a definitive painful arc      Physical exam of the left shoulder reveals no overlying skin changes no lymphedema no lymphadenopathy.  The patient can actively flex to 180, abduction is similar external rotation is to 50, internal rotation to the upper lumbar " "spine.  Rotator cuff strength is 4+ to 5 over 5 with isometric strength testing without symptoms.  The patient has good cervical range of motion full and asymptomatic no radicular symptoms and a normal elbow exam.  Patient has good distal pulses  Physical Exam: 50 y.o. female  General Appearance:    Alert, cooperative, in no acute distress                      Vitals:    02/02/21 1338   Temp: 97.4 °F (36.3 °C)   Weight: 91.6 kg (202 lb)   Height: 162.6 cm (64\")   PainSc:   8        Head:    Normocephalic, without obvious abnormality, atraumatic   Eyes:            conjunctivae and sclerae normal, no pallor, corneas clear,    Ears:    Ears appear intact with no abnormalities noted   Throat:   No oral lesions, no thrush, oral mucosa moist   Neck:   No adenopathy, supple, trachea midline, no thyromegaly,    Back:     No kyphosis present, no scoliosis present, no skin lesions,      erythema or scars, no tenderness to percussion or                   palpation,   range of motion normal   Lungs:     Clear to auscultation,respirations regular, even and                  unlabored    Heart:    Regular rhythm and normal rate               Chest Wall:    No abnormalities observed               Pulses:   Pulses palpable and equal bilaterally   Skin:   No bleeding, bruising or rash   Lymph nodes:   No palpable adenopathy   Neurologic:   Appears neurologic intact       Assessment/Plan:      Bilateral shoulder pain she is status post rotator cuff repair on the left that 1 is doing fine the right 1 continues to bother her she has a known rotator cuff tear which we did review her MRI.  Plan is to proceed with right shoulder rotator cuff repair we did discuss in detail risk benefits and alternatives.  She is obviously well versed in this because she had it done 1 year ago on the opposite side and is done quite well          "

## 2021-02-02 ENCOUNTER — OFFICE VISIT (OUTPATIENT)
Dept: ORTHOPEDIC SURGERY | Facility: CLINIC | Age: 51
End: 2021-02-02

## 2021-02-02 VITALS — BODY MASS INDEX: 34.49 KG/M2 | WEIGHT: 202 LBS | HEIGHT: 64 IN | TEMPERATURE: 97.4 F

## 2021-02-02 DIAGNOSIS — S46.011D TRAUMATIC COMPLETE TEAR OF RIGHT ROTATOR CUFF, SUBSEQUENT ENCOUNTER: ICD-10-CM

## 2021-02-02 DIAGNOSIS — R52 PAIN: Primary | ICD-10-CM

## 2021-02-02 PROCEDURE — 99212 OFFICE O/P EST SF 10 MIN: CPT | Performed by: ORTHOPAEDIC SURGERY

## 2021-02-02 PROCEDURE — 73030 X-RAY EXAM OF SHOULDER: CPT | Performed by: ORTHOPAEDIC SURGERY

## 2021-02-03 PROBLEM — S46.011A TRAUMATIC COMPLETE TEAR OF RIGHT ROTATOR CUFF: Status: ACTIVE | Noted: 2021-02-03

## 2021-02-17 ENCOUNTER — TRANSCRIBE ORDERS (OUTPATIENT)
Dept: PREADMISSION TESTING | Facility: HOSPITAL | Age: 51
End: 2021-02-17

## 2021-02-17 DIAGNOSIS — Z01.818 OTHER SPECIFIED PRE-OPERATIVE EXAMINATION: Primary | ICD-10-CM

## 2021-02-17 RX ORDER — ONDANSETRON 4 MG/1
4 TABLET, ORALLY DISINTEGRATING ORAL EVERY 8 HOURS PRN
Qty: 21 TABLET | Refills: 1 | Status: SHIPPED | OUTPATIENT
Start: 2021-02-17 | End: 2021-07-07 | Stop reason: SDUPTHER

## 2021-02-17 NOTE — TELEPHONE ENCOUNTER
----- Message from Jasmeet Sarmiento sent at 2/17/2021 10:34 AM EST -----  Regarding: med  Contact: [phone]  Pt is needing this refilled        ondansetron ODT (Zofran ODT) 4 MG disintegrating tablet         [facility]  Phone:  [phone]  Fax:  [phone]  Address:  [address] [place] [address]

## 2021-02-18 ENCOUNTER — TELEPHONE (OUTPATIENT)
Dept: ORTHOPEDIC SURGERY | Facility: CLINIC | Age: 51
End: 2021-02-18

## 2021-02-18 NOTE — TELEPHONE ENCOUNTER
Caller: CHRISTY VERNER    Relationship to patient: SELF    Best call back number:     Patient is needing: HELP WITH SHORT TERM DISABILITY PAPERWORK, UNABLE TO WARM TRANSFER

## 2021-02-22 ENCOUNTER — PRE-ADMISSION TESTING (OUTPATIENT)
Dept: PREADMISSION TESTING | Facility: HOSPITAL | Age: 51
End: 2021-02-22

## 2021-02-22 VITALS
HEART RATE: 89 BPM | TEMPERATURE: 97.2 F | BODY MASS INDEX: 34.78 KG/M2 | DIASTOLIC BLOOD PRESSURE: 83 MMHG | HEIGHT: 64 IN | RESPIRATION RATE: 18 BRPM | OXYGEN SATURATION: 100 % | WEIGHT: 203.7 LBS | SYSTOLIC BLOOD PRESSURE: 123 MMHG

## 2021-02-22 LAB
ANION GAP SERPL CALCULATED.3IONS-SCNC: 10.2 MMOL/L (ref 5–15)
BUN SERPL-MCNC: 11 MG/DL (ref 6–20)
BUN/CREAT SERPL: 13.9 (ref 7–25)
CALCIUM SPEC-SCNC: 8.5 MG/DL (ref 8.6–10.5)
CHLORIDE SERPL-SCNC: 104 MMOL/L (ref 98–107)
CO2 SERPL-SCNC: 27.8 MMOL/L (ref 22–29)
CREAT SERPL-MCNC: 0.79 MG/DL (ref 0.57–1)
DEPRECATED RDW RBC AUTO: 40.7 FL (ref 37–54)
ERYTHROCYTE [DISTWIDTH] IN BLOOD BY AUTOMATED COUNT: 13.1 % (ref 12.3–15.4)
GFR SERPL CREATININE-BSD FRML MDRD: 94 ML/MIN/1.73
GLUCOSE SERPL-MCNC: 78 MG/DL (ref 65–99)
HCT VFR BLD AUTO: 35.1 % (ref 34–46.6)
HGB BLD-MCNC: 11.4 G/DL (ref 12–15.9)
MCH RBC QN AUTO: 28.2 PG (ref 26.6–33)
MCHC RBC AUTO-ENTMCNC: 32.5 G/DL (ref 31.5–35.7)
MCV RBC AUTO: 86.9 FL (ref 79–97)
PLATELET # BLD AUTO: 214 10*3/MM3 (ref 140–450)
PMV BLD AUTO: 10.8 FL (ref 6–12)
POTASSIUM SERPL-SCNC: 3.6 MMOL/L (ref 3.5–5.2)
RBC # BLD AUTO: 4.04 10*6/MM3 (ref 3.77–5.28)
SODIUM SERPL-SCNC: 142 MMOL/L (ref 136–145)
WBC # BLD AUTO: 5.52 10*3/MM3 (ref 3.4–10.8)

## 2021-02-22 PROCEDURE — 36415 COLL VENOUS BLD VENIPUNCTURE: CPT

## 2021-02-22 PROCEDURE — 80048 BASIC METABOLIC PNL TOTAL CA: CPT

## 2021-02-22 PROCEDURE — 85027 COMPLETE CBC AUTOMATED: CPT

## 2021-02-22 NOTE — DISCHARGE INSTRUCTIONS
ARRIVE DAY OF SURGERY AT 5:45 AM TO MAIN SURGERY THEN TO [facility]        Take the following medications the morning of surgery: PEPCID, PANTOPRAZOLE    If you are on prescription narcotic pain medication to control your pain you may also take that medication the morning of surgery.    General Instructions:  • Do not eat solid food after midnight the night before surgery.  • You may drink clear liquids day of surgery but must stop at least one hour before your hospital arrival time.  • It is beneficial for you to have a clear drink that contains carbohydrates the day of surgery.  We suggest a 12 to 20 ounce bottle of Gatorade or Powerade for non-diabetic patients or a 12 to 20 ounce bottle of G2 or Powerade Zero for diabetic patients. (Pediatric patients, are not advised to drink a 12 to 20 ounce carbohydrate drink)    Clear liquids are liquids you can see through.  Nothing red in color.     Plain water                               Sports drinks  Sodas                                   Gelatin (Jell-O)  Fruit juices without pulp such as white grape juice and apple juice  Popsicles that contain no fruit or yogurt  Tea or coffee (no cream or milk added)  Gatorade / Powerade  G2 / Powerade Zero    • Infants may have breast milk up to four hours before surgery.  • Infants drinking formula may drink formula up to six hours before surgery.   • Patients who avoid smoking, chewing tobacco and alcohol for 4 weeks prior to surgery have a reduced risk of post-operative complications.  Quit smoking as many days before surgery as you can.  • Do not smoke, use chewing tobacco or drink alcohol the day of surgery.   • If applicable bring your C-PAP/ BI-PAP machine.  • Bring any papers given to you in the doctor’s office.  • Wear clean comfortable clothes.  • Do not wear contact lenses, false eyelashes or make-up.  Bring a case for your glasses.   • Bring crutches or walker if applicable.  • Remove all piercings.  Leave  jewelry and any other valuables at home.  • Hair extensions with metal clips must be removed prior to surgery.  • The Pre-Admission Testing nurse will instruct you to bring medications if unable to obtain an accurate list in Pre-Admission Testing.            Preventing a Surgical Site Infection:  • For 2 to 3 days before surgery, avoid shaving with a razor because the razor can irritate skin and make it easier to develop an infection.    • Any areas of open skin can increase the risk of a post-operative wound infection by allowing bacteria to enter and travel throughout the body.  Notify your surgeon if you have any skin wounds / rashes even if it is not near the expected surgical site.  The area will need assessed to determine if surgery should be delayed until it is healed.  • The night prior to surgery shower using a fresh bar of anti-bacterial soap (such as Dial) and clean washcloth.  Sleep in a clean bed with clean clothing.  Do not allow pets to sleep with you.  • Shower on the morning of surgery using a fresh bar of anti-bacterial soap (such as Dial) and clean washcloth.  Dry with a clean towel and dress in clean clothing.  • Ask your surgeon if you will be receiving antibiotics prior to surgery.  • Make sure you, your family, and all healthcare providers clean their hands with soap and water or an alcohol based hand  before caring for you or your wound.    Day of surgery:  Your arrival time is approximately two hours before your scheduled surgery time.  Upon arrival, a Pre-op nurse and Anesthesiologist will review your health history, obtain vital signs, and answer questions you may have.  The only belongings needed at this time will be a list of your home medications and if applicable your C-PAP/BI-PAP machine.  A Pre-op nurse will start an IV and you may receive medication in preparation for surgery, including something to help you relax.     Please be aware that surgery does come with discomfort.   We want to make every effort to control your discomfort so please discuss any uncontrolled symptoms with your nurse.   Your doctor will most likely have prescribed pain medications.      If you are going home after surgery you will receive individualized written care instructions before being discharged.  A responsible adult must drive you to and from the hospital on the day of your surgery and stay with you for 24 hours.  Discharge prescriptions can be filled by the hospital pharmacy during regular pharmacy hours.  If you are having surgery late in the day/evening your prescription may be e-prescribed to your pharmacy.  Please verify your pharmacy hours or chose a 24 hour pharmacy to avoid not having access to your prescription because your pharmacy has closed for the day.    If you are staying overnight following surgery, you will be transported to your hospital room following the recovery period.  [facility] has all private rooms.    If you have any questions please call Pre-Admission Testing at [phone].  Deductibles and co-payments are collected on the day of service. Please be prepared to pay the required co-pay, deductible or deposit on the day of service as defined by your plan.    Patient Education for Self-Quarantine Process    Following your COVID testing, we strongly recommend that you do not leave your home after you have been tested for COVID except to get medical care. This includes not going to work, school or to public areas.  If this is not possible for you to do please limit your activities to only required outings.  Be sure to wear a mask when you are with other people, practice social distancing and wash your hands frequently.      The following items provide additional details to keep you safe.  • Wash your hands with soap and water frequently for at least 20 seconds.   • Avoid touching your eyes, nose and mouth with unwashed hands.  • Do not share anything - utensils,  towels, food from the same bowl.   • Have your own utensils, drinking glass, dishes, towels and bedding.   • Do not have visitors.   • Do use FaceTime to stay in touch with family and friends.  • You should stay in a specific room away from others if possible.   • Stay at least 6 feet away from others in the home if you cannot have a dedicated room to yourself.   • Do not snuggle with your pet. While the CDC says there is no evidence that pets can spread COVID-19 or be infected from humans, it is probably best to avoid “petting, snuggling, being kissed or licked and sharing food (during self-quarantine)”, according to the CDC.   • Sanitize household surfaces daily. Include all high touch areas (door handles, light switches, phones, countertops, etc.)  • Do not share a bathroom with others, if possible.   • Wear a mask around others in your home if you are unable to stay in a separate room or 6 feet apart. If  you are unable to wear a mask, have your family member wear a mask if they must be within 6 feet of you.   Call your surgeon immediately if you experience any of the following symptoms:  • Sore Throat  • Shortness of Breath or difficulty breathing  • Cough  • Chills  • Body soreness or muscle pain  • Headache  • Fever  • New loss of taste or smell  • Do not arrive for your surgery ill.  Your procedure will need to be rescheduled to another time.  You will need to call your physician before the day of surgery to avoid any unnecessary exposure to hospital staff as well as other patients.

## 2021-02-24 ENCOUNTER — TELEPHONE (OUTPATIENT)
Dept: ORTHOPEDIC SURGERY | Facility: CLINIC | Age: 51
End: 2021-02-24

## 2021-02-24 NOTE — TELEPHONE ENCOUNTER
Caller: CHRISTY VERNER    Relationship: SELF    Best call back number: [phone]    What form or medical record are you requesting: WORK LEAVE- DATE OF SURGERY AND HOW LONG PATIENT WILL BE OUT- APPROX 12 WEEKS    Who is requesting this form or medical record from you: EMPLOYER    How would you like to receive the form or medical records (pick-up, mail, fax):       Timeframe paperwork needed: ASAP    Additional notes: PATIENT JUST NEED DOCTORS NOTE FOR WORK- WITH DATE OF SURGERY AND THE APPROXIMATE TIME SHE WILL BE OUT- SHE STATED 12 WEEKS.     PLEASE CALL WHEN READY- PATIENT WILL

## 2021-02-26 ENCOUNTER — LAB (OUTPATIENT)
Dept: LAB | Facility: HOSPITAL | Age: 51
End: 2021-02-26

## 2021-02-26 DIAGNOSIS — Z01.818 OTHER SPECIFIED PRE-OPERATIVE EXAMINATION: ICD-10-CM

## 2021-02-26 PROCEDURE — U0004 COV-19 TEST NON-CDC HGH THRU: HCPCS

## 2021-02-26 PROCEDURE — C9803 HOPD COVID-19 SPEC COLLECT: HCPCS

## 2021-02-27 LAB — SARS-COV-2 ORF1AB RESP QL NAA+PROBE: NOT DETECTED

## 2021-03-01 ENCOUNTER — HOSPITAL ENCOUNTER (OUTPATIENT)
Facility: HOSPITAL | Age: 51
Setting detail: HOSPITAL OUTPATIENT SURGERY
Discharge: HOME | End: 2021-03-01
Attending: ORTHOPAEDIC SURGERY | Admitting: ORTHOPAEDIC SURGERY

## 2021-03-01 ENCOUNTER — ANESTHESIA EVENT (OUTPATIENT)
Dept: PERIOP | Facility: HOSPITAL | Age: 51
End: 2021-03-01

## 2021-03-01 ENCOUNTER — ANESTHESIA (OUTPATIENT)
Dept: PERIOP | Facility: HOSPITAL | Age: 51
End: 2021-03-01

## 2021-03-01 VITALS
DIASTOLIC BLOOD PRESSURE: 82 MMHG | RESPIRATION RATE: 16 BRPM | OXYGEN SATURATION: 96 % | SYSTOLIC BLOOD PRESSURE: 126 MMHG | TEMPERATURE: 97.3 F | HEART RATE: 69 BPM

## 2021-03-01 DIAGNOSIS — S46.011D TRAUMATIC COMPLETE TEAR OF RIGHT ROTATOR CUFF, SUBSEQUENT ENCOUNTER: ICD-10-CM

## 2021-03-01 PROCEDURE — 25010000002 NEOSTIGMINE PER 0.5 MG: Performed by: NURSE ANESTHETIST, CERTIFIED REGISTERED

## 2021-03-01 PROCEDURE — 23412 REPAIR ROTATOR CUFF CHRONIC: CPT | Performed by: ORTHOPAEDIC SURGERY

## 2021-03-01 PROCEDURE — 25010000002 DEXAMETHASONE PER 1 MG: Performed by: NURSE ANESTHETIST, CERTIFIED REGISTERED

## 2021-03-01 PROCEDURE — 25010000003 CEFAZOLIN IN DEXTROSE 2-4 GM/100ML-% SOLUTION: Performed by: ORTHOPAEDIC SURGERY

## 2021-03-01 PROCEDURE — 25010000002 FENTANYL CITRATE (PF) 100 MCG/2ML SOLUTION: Performed by: ANESTHESIOLOGY

## 2021-03-01 PROCEDURE — 25010000002 EPINEPHRINE PER 0.1 MG: Performed by: ORTHOPAEDIC SURGERY

## 2021-03-01 PROCEDURE — C9290 INJ, BUPIVACAINE LIPOSOME: HCPCS | Performed by: ANESTHESIOLOGY

## 2021-03-01 PROCEDURE — 25010000003 CEFAZOLIN PER 500 MG: Performed by: ORTHOPAEDIC SURGERY

## 2021-03-01 PROCEDURE — C1713 ANCHOR/SCREW BN/BN,TIS/BN: HCPCS | Performed by: ORTHOPAEDIC SURGERY

## 2021-03-01 PROCEDURE — 25010000002 PROPOFOL 10 MG/ML EMULSION: Performed by: NURSE ANESTHETIST, CERTIFIED REGISTERED

## 2021-03-01 PROCEDURE — 25010000002 PHENYLEPHRINE PER 1 ML: Performed by: NURSE ANESTHETIST, CERTIFIED REGISTERED

## 2021-03-01 PROCEDURE — C1889 IMPLANT/INSERT DEVICE, NOC: HCPCS | Performed by: ORTHOPAEDIC SURGERY

## 2021-03-01 PROCEDURE — 25010000003 BUPIVACAINE LIPOSOME 1.3 % SUSPENSION: Performed by: ANESTHESIOLOGY

## 2021-03-01 PROCEDURE — 25010000002 ONDANSETRON PER 1 MG: Performed by: NURSE ANESTHETIST, CERTIFIED REGISTERED

## 2021-03-01 PROCEDURE — 76942 ECHO GUIDE FOR BIOPSY: CPT | Performed by: ORTHOPAEDIC SURGERY

## 2021-03-01 PROCEDURE — 25010000002 MIDAZOLAM PER 1 MG: Performed by: ANESTHESIOLOGY

## 2021-03-01 DEVICE — SUT/ANCH PUSHLOCK BIOCOMP 3.5X19.5: Type: IMPLANTABLE DEVICE | Site: SHOULDER | Status: FUNCTIONAL

## 2021-03-01 DEVICE — SUT FW 2/0 38IN 1BLU 1BLK/WHT  AR7201: Type: IMPLANTABLE DEVICE | Site: SHOULDER | Status: FUNCTIONAL

## 2021-03-01 DEVICE — SPEEDBRG IMP SYS W/BIO-COMP SWVLK
Type: IMPLANTABLE DEVICE | Site: SHOULDER | Status: FUNCTIONAL
Brand: ARTHREX®

## 2021-03-01 RX ORDER — DIPHENHYDRAMINE HYDROCHLORIDE 50 MG/ML
12.5 INJECTION INTRAMUSCULAR; INTRAVENOUS
Status: DISCONTINUED | OUTPATIENT
Start: 2021-03-01 | End: 2021-03-01 | Stop reason: HOSPADM

## 2021-03-01 RX ORDER — PROPOFOL 10 MG/ML
VIAL (ML) INTRAVENOUS AS NEEDED
Status: DISCONTINUED | OUTPATIENT
Start: 2021-03-01 | End: 2021-03-01 | Stop reason: SURG

## 2021-03-01 RX ORDER — NALBUPHINE HCL 10 MG/ML
10 AMPUL (ML) INJECTION EVERY 4 HOURS PRN
Status: DISCONTINUED | OUTPATIENT
Start: 2021-03-01 | End: 2021-03-01 | Stop reason: HOSPADM

## 2021-03-01 RX ORDER — ROCURONIUM BROMIDE 10 MG/ML
INJECTION, SOLUTION INTRAVENOUS AS NEEDED
Status: DISCONTINUED | OUTPATIENT
Start: 2021-03-01 | End: 2021-03-01 | Stop reason: SURG

## 2021-03-01 RX ORDER — ACETAMINOPHEN 500 MG
500 TABLET ORAL ONCE
Status: COMPLETED | OUTPATIENT
Start: 2021-03-01 | End: 2021-03-01

## 2021-03-01 RX ORDER — SCOLOPAMINE TRANSDERMAL SYSTEM 1 MG/1
1 PATCH, EXTENDED RELEASE TRANSDERMAL
Status: DISCONTINUED | OUTPATIENT
Start: 2021-03-01 | End: 2021-03-01 | Stop reason: HOSPADM

## 2021-03-01 RX ORDER — MIDAZOLAM HYDROCHLORIDE 1 MG/ML
2 INJECTION INTRAMUSCULAR; INTRAVENOUS
Status: DISCONTINUED | OUTPATIENT
Start: 2021-03-01 | End: 2021-03-01 | Stop reason: HOSPADM

## 2021-03-01 RX ORDER — GLYCOPYRROLATE 0.2 MG/ML
INJECTION INTRAMUSCULAR; INTRAVENOUS AS NEEDED
Status: DISCONTINUED | OUTPATIENT
Start: 2021-03-01 | End: 2021-03-01 | Stop reason: SURG

## 2021-03-01 RX ORDER — SODIUM CHLORIDE 0.9 % (FLUSH) 0.9 %
10 SYRINGE (ML) INJECTION AS NEEDED
Status: DISCONTINUED | OUTPATIENT
Start: 2021-03-01 | End: 2021-03-01 | Stop reason: HOSPADM

## 2021-03-01 RX ORDER — HYDRALAZINE HYDROCHLORIDE 20 MG/ML
5 INJECTION INTRAMUSCULAR; INTRAVENOUS
Status: DISCONTINUED | OUTPATIENT
Start: 2021-03-01 | End: 2021-03-01 | Stop reason: HOSPADM

## 2021-03-01 RX ORDER — SODIUM CHLORIDE, SODIUM LACTATE, POTASSIUM CHLORIDE, CALCIUM CHLORIDE 600; 310; 30; 20 MG/100ML; MG/100ML; MG/100ML; MG/100ML
9 INJECTION, SOLUTION INTRAVENOUS CONTINUOUS
Status: DISCONTINUED | OUTPATIENT
Start: 2021-03-01 | End: 2021-03-01 | Stop reason: HOSPADM

## 2021-03-01 RX ORDER — FENTANYL CITRATE 50 UG/ML
50 INJECTION, SOLUTION INTRAMUSCULAR; INTRAVENOUS
Status: DISCONTINUED | OUTPATIENT
Start: 2021-03-01 | End: 2021-03-01 | Stop reason: HOSPADM

## 2021-03-01 RX ORDER — LIDOCAINE HYDROCHLORIDE 10 MG/ML
0.5 INJECTION, SOLUTION EPIDURAL; INFILTRATION; INTRACAUDAL; PERINEURAL ONCE AS NEEDED
Status: DISCONTINUED | OUTPATIENT
Start: 2021-03-01 | End: 2021-03-01 | Stop reason: HOSPADM

## 2021-03-01 RX ORDER — SODIUM CHLORIDE 0.9 % (FLUSH) 0.9 %
10 SYRINGE (ML) INJECTION EVERY 12 HOURS SCHEDULED
Status: DISCONTINUED | OUTPATIENT
Start: 2021-03-01 | End: 2021-03-01 | Stop reason: HOSPADM

## 2021-03-01 RX ORDER — ONDANSETRON 2 MG/ML
4 INJECTION INTRAMUSCULAR; INTRAVENOUS ONCE AS NEEDED
Status: DISCONTINUED | OUTPATIENT
Start: 2021-03-01 | End: 2021-03-01 | Stop reason: HOSPADM

## 2021-03-01 RX ORDER — HYDROMORPHONE HYDROCHLORIDE 1 MG/ML
0.25 INJECTION, SOLUTION INTRAMUSCULAR; INTRAVENOUS; SUBCUTANEOUS
Status: DISCONTINUED | OUTPATIENT
Start: 2021-03-01 | End: 2021-03-01 | Stop reason: HOSPADM

## 2021-03-01 RX ORDER — NALBUPHINE HCL 10 MG/ML
2 AMPUL (ML) INJECTION EVERY 4 HOURS PRN
Status: DISCONTINUED | OUTPATIENT
Start: 2021-03-01 | End: 2021-03-01 | Stop reason: HOSPADM

## 2021-03-01 RX ORDER — ONDANSETRON 4 MG/1
4 TABLET, FILM COATED ORAL EVERY 8 HOURS PRN
Qty: 20 TABLET | Refills: 0 | Status: SHIPPED | OUTPATIENT
Start: 2021-03-01 | End: 2021-03-19 | Stop reason: SDUPTHER

## 2021-03-01 RX ORDER — LIDOCAINE HYDROCHLORIDE 20 MG/ML
INJECTION, SOLUTION INFILTRATION; PERINEURAL AS NEEDED
Status: DISCONTINUED | OUTPATIENT
Start: 2021-03-01 | End: 2021-03-01 | Stop reason: SURG

## 2021-03-01 RX ORDER — PROMETHAZINE HYDROCHLORIDE 25 MG/1
25 TABLET ORAL ONCE AS NEEDED
Status: DISCONTINUED | OUTPATIENT
Start: 2021-03-01 | End: 2021-03-01 | Stop reason: HOSPADM

## 2021-03-01 RX ORDER — MIDAZOLAM HYDROCHLORIDE 1 MG/ML
1 INJECTION INTRAMUSCULAR; INTRAVENOUS
Status: DISCONTINUED | OUTPATIENT
Start: 2021-03-01 | End: 2021-03-01 | Stop reason: HOSPADM

## 2021-03-01 RX ORDER — OXYCODONE AND ACETAMINOPHEN 7.5; 325 MG/1; MG/1
1-2 TABLET ORAL EVERY 4 HOURS PRN
Qty: 60 TABLET | Refills: 0 | Status: SHIPPED | OUTPATIENT
Start: 2021-03-01 | End: 2021-03-15 | Stop reason: SDUPTHER

## 2021-03-01 RX ORDER — NALOXONE HCL 0.4 MG/ML
0.4 VIAL (ML) INJECTION AS NEEDED
Status: DISCONTINUED | OUTPATIENT
Start: 2021-03-01 | End: 2021-03-01 | Stop reason: HOSPADM

## 2021-03-01 RX ORDER — DEXAMETHASONE SODIUM PHOSPHATE 10 MG/ML
INJECTION INTRAMUSCULAR; INTRAVENOUS AS NEEDED
Status: DISCONTINUED | OUTPATIENT
Start: 2021-03-01 | End: 2021-03-01 | Stop reason: SURG

## 2021-03-01 RX ORDER — WOUND DRESSING ADHESIVE - LIQUID
LIQUID MISCELLANEOUS AS NEEDED
Status: DISCONTINUED | OUTPATIENT
Start: 2021-03-01 | End: 2021-03-01 | Stop reason: HOSPADM

## 2021-03-01 RX ORDER — HYDROCODONE BITARTRATE AND ACETAMINOPHEN 5; 325 MG/1; MG/1
1 TABLET ORAL ONCE AS NEEDED
Status: COMPLETED | OUTPATIENT
Start: 2021-03-01 | End: 2021-03-01

## 2021-03-01 RX ORDER — BUPIVACAINE HYDROCHLORIDE 5 MG/ML
INJECTION, SOLUTION EPIDURAL; INTRACAUDAL
Status: COMPLETED | OUTPATIENT
Start: 2021-03-01 | End: 2021-03-01

## 2021-03-01 RX ORDER — ONDANSETRON 2 MG/ML
INJECTION INTRAMUSCULAR; INTRAVENOUS AS NEEDED
Status: DISCONTINUED | OUTPATIENT
Start: 2021-03-01 | End: 2021-03-01 | Stop reason: SURG

## 2021-03-01 RX ORDER — CEFAZOLIN SODIUM 2 G/100ML
2 INJECTION, SOLUTION INTRAVENOUS ONCE
Status: COMPLETED | OUTPATIENT
Start: 2021-03-01 | End: 2021-03-01

## 2021-03-01 RX ADMIN — PHENYLEPHRINE HYDROCHLORIDE 100 MCG: 10 INJECTION INTRAVENOUS at 08:11

## 2021-03-01 RX ADMIN — SODIUM CHLORIDE, POTASSIUM CHLORIDE, SODIUM LACTATE AND CALCIUM CHLORIDE 9 ML/HR: 600; 310; 30; 20 INJECTION, SOLUTION INTRAVENOUS at 06:36

## 2021-03-01 RX ADMIN — FENTANYL CITRATE 50 MCG: 50 INJECTION, SOLUTION INTRAMUSCULAR; INTRAVENOUS at 06:54

## 2021-03-01 RX ADMIN — BUPIVACAINE 10 ML: 13.3 INJECTION, SUSPENSION, LIPOSOMAL INFILTRATION at 07:02

## 2021-03-01 RX ADMIN — DEXAMETHASONE SODIUM PHOSPHATE 8 MG: 10 INJECTION INTRAMUSCULAR; INTRAVENOUS at 07:50

## 2021-03-01 RX ADMIN — ACETAMINOPHEN 500 MG: 500 TABLET, FILM COATED ORAL at 06:36

## 2021-03-01 RX ADMIN — MIDAZOLAM 1 MG: 1 INJECTION INTRAMUSCULAR; INTRAVENOUS at 06:54

## 2021-03-01 RX ADMIN — PHENYLEPHRINE HYDROCHLORIDE 100 MCG: 10 INJECTION INTRAVENOUS at 08:51

## 2021-03-01 RX ADMIN — PHENYLEPHRINE HYDROCHLORIDE 100 MCG: 10 INJECTION INTRAVENOUS at 08:21

## 2021-03-01 RX ADMIN — CEFAZOLIN SODIUM 2 G: 2 INJECTION, SOLUTION INTRAVENOUS at 07:32

## 2021-03-01 RX ADMIN — Medication 3 MG: at 08:59

## 2021-03-01 RX ADMIN — SODIUM CHLORIDE, POTASSIUM CHLORIDE, SODIUM LACTATE AND CALCIUM CHLORIDE: 600; 310; 30; 20 INJECTION, SOLUTION INTRAVENOUS at 09:06

## 2021-03-01 RX ADMIN — BUPIVACAINE HYDROCHLORIDE 10 ML: 5 INJECTION, SOLUTION EPIDURAL; INTRACAUDAL; PERINEURAL at 07:02

## 2021-03-01 RX ADMIN — HYDROCODONE BITARTRATE AND ACETAMINOPHEN 1 TABLET: 5; 325 TABLET ORAL at 10:43

## 2021-03-01 RX ADMIN — GLYCOPYRROLATE 0.6 MG: 0.2 INJECTION INTRAMUSCULAR; INTRAVENOUS at 08:59

## 2021-03-01 RX ADMIN — ONDANSETRON 4 MG: 2 INJECTION INTRAMUSCULAR; INTRAVENOUS at 08:59

## 2021-03-01 RX ADMIN — ROCURONIUM BROMIDE 40 MG: 50 INJECTION INTRAVENOUS at 07:38

## 2021-03-01 RX ADMIN — SCOPALAMINE 1 PATCH: 1 PATCH, EXTENDED RELEASE TRANSDERMAL at 07:13

## 2021-03-01 RX ADMIN — PROPOFOL 200 MG: 10 INJECTION, EMULSION INTRAVENOUS at 07:38

## 2021-03-01 RX ADMIN — LIDOCAINE HYDROCHLORIDE 100 MG: 20 INJECTION, SOLUTION INFILTRATION; PERINEURAL at 07:38

## 2021-03-01 NOTE — TELEPHONE ENCOUNTER
Placed work note at [facility] screening desk and advised anyone she approves may  letter.

## 2021-03-01 NOTE — ANESTHESIA POSTPROCEDURE EVALUATION
Patient: Christy L Verner    Procedure Summary     Date: 03/01/21 Room / Location:  [facility]  [facility]  [facility]    Anesthesia Start: 0732 Anesthesia Stop: 0915    Procedure: SHOULDER ARTHROSCOPY, DEBRIDEMENT OF LABRUM, DEOMPRESSION, AND MINI OPEN ROTATOR CUFF REPAIR (Right Shoulder) Diagnosis:       Traumatic complete tear of right rotator cuff, subsequent encounter      (Traumatic complete tear of right rotator cuff, subsequent encounter [S46.011D])    Surgeon: Leisa Patel MD Provider: Lex Echols MD    Anesthesia Type: general with block ASA Status: 3          Anesthesia Type: general with block    Vitals  Vitals Value Taken Time   /87 03/01/21 1000   Temp 36.3 °C (97.3 °F) 03/01/21 1000   Pulse 93 03/01/21 1000   Resp 16 03/01/21 1000   SpO2 97 % 03/01/21 1014   Vitals shown include unvalidated device data.        Post Anesthesia Care and Evaluation    Patient location during evaluation: bedside  Patient participation: complete - patient participated  Level of consciousness: awake and alert  Pain management: adequate  Airway patency: patent  Anesthetic complications: No anesthetic complications  PONV Status: controlled  Cardiovascular status: blood pressure returned to baseline and acceptable  Respiratory status: acceptable  Hydration status: acceptable

## 2021-03-01 NOTE — H&P
"   History & Physical       Patient: Christy L Verner    Date of Admission: 3/1/2021  5:48 AM    YOB: 1970    Medical Record Number: [id number]    Attending Physician: Leisa Patel MD        Chief Complaints: Traumatic complete tear of right rotator cuff, subsequent encounter [S46.011D]      History of Present Illness: This patient is status post left shoulder arthroscopy rotator cuff repair she also has an MRI which shows a right rotator cuff tear she presents for repair of the right     Allergies: No Known Allergies    Medications:   Home Medications:  No current facility-administered medications on file prior to encounter.      Current Outpatient Medications on File Prior to Encounter   Medication Sig   • famotidine (Pepcid) 20 MG tablet Take 1 tablet by mouth 2 (Two) Times a Day As Needed for Indigestion or Heartburn. (Patient taking differently: Take 20 mg by mouth Daily.)   • methocarbamol (ROBAXIN) 500 MG tablet Take 1 tablet by mouth 4 (Four) Times a Day.   • pantoprazole (PROTONIX) 40 MG EC tablet Take 1 tablet by mouth 2 (two) times a day.   • sucralfate (CARAFATE) 1 g tablet Take 1 tablet by mouth 4 (Four) Times a Day With Meals & at Bedtime. (Patient taking differently: Take 1 g by mouth As Needed.)     Current Medications:  Scheduled Meds:bupivacaine liposome, 10 mL, Infiltration, Once  ceFAZolin, 2 g, Intravenous, Once  Scopolamine, 1 patch, Transdermal, Q72H  sodium chloride, 10 mL, Intravenous, Q12H      Continuous Infusions:lactated ringers, 9 mL/hr, Last Rate: 9 mL/hr (03/01/21 0636)      PRN Meds:.fentanyl  •  lidocaine PF 1%  •  midazolam **OR** midazolam  •  sodium chloride    Past Medical History:   Diagnosis Date   • Abdominal pain     9/2020   • Anesthesia     \"SLOW TO AWAKEN\"-PT'S MOTHER TOOK 1 WEEK TO COME OUT OF ANESTHESIA   • Arthritis    • Disease of thyroid gland    • GERD (gastroesophageal reflux disease)    • PONV (postoperative nausea and vomiting)    • Right " shoulder pain    • Slow to wake up after anesthesia    • Torn rotator cuff     RIGHT        Past Surgical History:   Procedure Laterality Date   • APPENDECTOMY     • BUNIONECTOMY Bilateral    •  SECTION     • COLONOSCOPY N/A 2021    Procedure: COLONOSCOPY into cecum and normal terminal ileum with hot snare polypectomy and clip placementx1;  Surgeon: Lele Christian MD;  Location: [facility] ENDOSCOPY;  Service: Gastroenterology;  Laterality: N/A;  pre: abd pain  post:polyp, hemorrhoids   • ENDOSCOPY N/A 2020    Procedure: ESOPHAGOGASTRODUODENOSCOPY WITH BIOPSIES;  Surgeon: Lele Christian MD;  Location: [facility] ENDOSCOPY;  Service: Gastroenterology;  Laterality: N/A;  PRE- REFLUX, EPIGASTRIC PAIN, NAUSEA  POST- HIATAL HERNIA, GASTRITIS   • HAND SURGERY Left    • KNEE ARTHROSCOPY Left 2018    Procedure: LEFT KNEE ARTHROSCOPY, EXTENSIVE CHONDROPLASTY, PARTIAL LATERAL MENISECTOMY;  Surgeon: Moe Martínez MD;  Location: [facility];  Service: Orthopedics   • KNEE MENISCAL REPAIR Left    • SHOULDER ARTHROSCOPY W/ ROTATOR CUFF REPAIR Left 2020    Procedure: SHOULDER ARTHROSCOPY WITH ROTATOR CUFF REPAIR, DEBRIDEMENT OF LABRUM, DECOMPRESSION;  Surgeon: Leisa Patel MD;  Location: [facility] OR [facility];  Service: Orthopedics;  Laterality: Left;   • SUBTOTAL HYSTERECTOMY     • TOTAL THYROIDECTOMY          Social History     Occupational History   • Not on file   Tobacco Use   • Smoking status: Former Smoker     Years: 7.00     Types: Cigarettes     Quit date: 2018     Years since quitting: 3.1   • Smokeless tobacco: Never Used   • Tobacco comment: 3-4 CIGARETTES PER DAY   Substance and Sexual Activity   • Alcohol use: No   • Drug use: No   • Sexual activity: Defer      Social History     Social History Narrative   • Not on file        Family History   Problem Relation Age of Onset   • Rheum arthritis Mother    • Malig Hyperthermia Neg Hx        Review of Systems      Physical Exam:  50 y.o. female  General Appearance:    Alert, cooperative, in no acute distress                      Vitals:    03/01/21 0621 03/01/21 0703 03/01/21 0715   BP: 126/89 123/81 130/81   BP Location: Left arm Left arm Left arm   Patient Position: Lying Lying Lying   Pulse: 89 81 77   Resp:  16 16   Temp: 97.8 °F (36.6 °C)     TempSrc: Oral     SpO2: 98% 100% 100%        Head:    Normocephalic, without obvious abnormality, atraumatic   Eyes:            conjunctivae and sclerae normal, no pallor, corneas clear,    Ears:    Ears appear intact with no abnormalities noted   Throat:   No oral lesions, no thrush, oral mucosa moist   Neck:   No adenopathy, supple, trachea midline, no thyromegaly,    Back:     No kyphosis present, no scoliosis present, no skin lesions,      erythema or scars, no tenderness to percussion or                   palpation,   range of motion normal   Lungs:     Clear to auscultation,respirations regular, even and                  unlabored    Heart:    Regular rhythm and normal rate               Chest Wall:    No abnormalities observed   Abdomen:     Normal bowel sounds, no masses, no organomegaly, soft        non-tender, non-distended, no guarding, no rebound                tenderness   Rectal:     Deferred   Extremities:    Moves all extremities well, no edema,   no cyanosis, no redness   Pulses:   Pulses palpable and equal bilaterally   Skin:   No bleeding, bruising or rash   Lymph nodes:   No palpable adenopathy   Neurologic:   Appears neurologic intact             Assessment:  Patient Active Problem List   Diagnosis   • Injury of left knee   • H/O thyroidectomy   • Menopause   • Complete tear of right rotator cuff   • Incomplete tear of left rotator cuff   • Traumatic incomplete tear of left rotator cuff   • Status post rotator cuff repair   • S/P rotator cuff repair   • Epigastric pain   • Nausea   • Pain of upper abdomen   • Constipation   • Screening for colorectal cancer   • Traumatic  complete tear of right rotator cuff           Plan: All risks, benefits and alternatives were discussed.  Risks including to but not exclusive to anesthetic complications, including death, MI, CVA, infection, bleeding DVT, PE,  fracture, residual pain and need for future surgery.  Patient understood all and agrees to proceed.

## 2021-03-01 NOTE — ANESTHESIA PROCEDURE NOTES
Airway  Urgency: elective    Date/Time: 3/1/2021 7:41 AM  Airway not difficult    General Information and Staff    Patient location during procedure: OR  Anesthesiologist: Lex Echols MD  CRNA: Salvatore Hicks CRNA    Indications and Patient Condition  Indications for airway management: airway protection    Preoxygenated: yes  MILS maintained throughout  Mask difficulty assessment: 1 - vent by mask    Final Airway Details  Final airway type: endotracheal airway      Successful airway: ETT  Cuffed: yes   Successful intubation technique: direct laryngoscopy  Facilitating devices/methods: intubating stylet  Endotracheal tube insertion site: oral  Blade: Gracie  Blade size: 3  ETT size (mm): 7.0  Cormack-Lehane Classification: grade I - full view of glottis  Placement verified by: chest auscultation and capnometry   Cuff volume (mL): 8  Measured from: lips  ETT/EBT  to lips (cm): 22  Number of attempts at approach: 1  Assessment: lips, teeth, and gum same as pre-op and atraumatic intubation

## 2021-03-01 NOTE — ANESTHESIA PROCEDURE NOTES
Peripheral Block    Pre-sedation assessment completed: 3/1/2021 6:57 AM    Patient reassessed immediately prior to procedure    Patient location during procedure: pre-op  Start time: 3/1/2021 7:57 AM  Stop time: 3/1/2021 7:02 AM  Reason for block: at surgeon's request and post-op pain management  Performed by  Anesthesiologist: Lex Echols MD  Preanesthetic Checklist  Completed: patient identified, site marked, surgical consent, pre-op evaluation, timeout performed, IV checked, risks and benefits discussed and monitors and equipment checked  Prep:  Sterile barriers:gloves  Prep: ChloraPrep  Patient monitoring: blood pressure monitoring, continuous pulse oximetry and EKG  Procedure  Sedation:yes    Guidance:ultrasound guided  ULTRASOUND INTERPRETATION.  Using ultrasound guidance a 22 G gauge needle was placed in close proximity to the brachial plexus nerve, at which point, under ultrasound guidance anesthetic was injected in the area of the nerve and spread of the anesthesia was seen on ultrasound in close proximity thereto.  There were no abnormalities seen on ultrasound; a digital image was taken; and the patient tolerated the procedure with no complications. Images:still images obtained    Laterality:right  Block Type:interscalene  Injection Technique:single-shot  Needle Type:short-bevel  Needle Gauge:22 G      Medications Used: bupivacaine (PF) (MARCAINE) 0.5 % injection, 10 mL  bupivacaine liposome (EXPAREL) 1.3 % injection, 10 mL      Medications  Comment:.    Post Assessment  Injection Assessment: negative aspiration for heme, no paresthesia on injection and incremental injection  Patient Tolerance:comfortable throughout block  Complications:no

## 2021-03-01 NOTE — ANESTHESIA PREPROCEDURE EVALUATION
Anesthesia Evaluation     history of anesthetic complications: PONV  NPO Solid Status: > 8 hours             Airway   Mallampati: II  TM distance: >3 FB  Neck ROM: full  No difficulty expected  Dental - normal exam     Pulmonary - normal exam   (-) not a smoker  Cardiovascular - negative cardio ROS    ECG reviewed  Rhythm: regular        Neuro/Psych  GI/Hepatic/Renal/Endo    (+)  GERD,      Musculoskeletal     Abdominal  - normal exam   Substance History      OB/GYN          Other                        Anesthesia Plan    ASA 3     general with block   (  D/W R&B of GA including but not limited to: heart, lung, liver, kidney, neurologic problems, positioning injuries, dental damage, corneal abrasion and TMJ.  .Risks of peripheral nerve block were discussed with patient including but not limited to: inadequate block, bleeding, infection, persistent numbness or weakness, nerve damage, painful dysasthesia and need to protect limb while numb.)  intravenous induction

## 2021-03-01 NOTE — OP NOTE
Rotator Cuff RepairOperative Note      Facility: [facility]  Patient Name: Christy L Verner  YOB: 1970  Date: 3/1/2021  Medical Record Number: [id number]      Pre-op Diagnosis:   Traumatic complete tear of right rotator cuff, subsequent encounter [S46.011D]    Post-Op Diagnosis Codes:     * Traumatic complete tear of right rotator cuff, subsequent encounter [S46.011D] impingement, degenerative labral tear    Procedure(s):  Right SHOULDER ARTHROSCOPY, DEBRIDEMENT OF LABRUM, DEOMPRESSION, AND MINI OPEN ROTATOR CUFF REPAIR with Arthrex speed bridge    Surgeon(s):  Leisa Patel MD    Anesthesia: General with Block  Anesthesiologist: Lex Echols MD  CRNA: Salvatore Hicks CRNA    Staff:   Circulator: Laya Parr RN  Scrub Person: Alyx Mejia       [id number],[email or web address])@    Estimated Blood Loss: Less [id number]    Specimens:   Order Name Source Comment Collection Info Order Time   PREGNANCY, URINE Urine, Clean Catch All female patient with intact uterus and not post menopausal  3/1/2021  5:58 AM       Drains: None    Findings: See Dictation    Complications: None    Indication for procedure:   This patient has had a several month history of right shoulder pain that is been unresponsive to conservative management.  They have an exam and an MRI which are consistent with rotator cuff pathology and they present for arthroscopy and possible repair.  They understand all risks benefits and alternatives.  Risk including but not exclusive to anesthetic complications including death,  MI.  CVA as well as infection bleeding DVT PE stiffness,  failure to relieve their symptoms and need for future surgery.  They understand this and agree to proceed.    Description of procedure:  Patient was taken to the operating room.  They were placed supine on the  operating room table.  After induction of adequate LMA anesthesia and scalene nerve block and IV antibiotics.  They underwent  exam under anesthesia was symmetric full range of motion which was symmetric side to side.  They were then placed in the modified beachchair position all prominent areas well padded and head well stabilized.  The arm was prepped and draped  in usual sterile fashion, bony landmarks were demarcated and the joint was infiltrated with 30 cc of fluid.  A standard posterior portal was made inferior and medial to the posterior lateral edge of the acromion with an 11 blade.  Blunt trocar penetrated into the joint scope following her evaluation began.  An anterior portal was established in the interval between the subscapularis and the biceps tendon with spinal needle localization and direct visualization. She was found have a degenerative tear of the superior aspect of the labrum that was debrided with the Apollo device and the motorized shaver.  The rotator cuff appeared pathologic.    I then exited the space, entered subacromial space. I made accessory lateral portal off the anterior lateral edge of the acromion,placed the shaver and removed thickened bursa. I delineated the anterior lateral edge of the acromion with the Apollo device and removed a large spur with a bone cutter. There was plenty of room under this joint after this for the rotator cuff. The rotator cuff was evaluated and was found have a small full-thickness tear.. I did place a retention stitch with the scorpion device. I then exited the space, placed the Kobel retractors and readily identified the rotator cuff tear. The tuberosity was prepared with a rongeur.  2 Arthrex swivel lock anchors preloaded with fiber tape was placed in standard fashion with good bony pullout. A fiber tape was preloaded scorpion device was used to place each of the fiber wires one anterior one posterior. I then placed a cinch stitch anterior and posterior and incorporated this cinch stitch and 2 fiber wires in each of 2 swivel lock anchors on the lateral row.  1 additional push  lock anchor was used to capture 1 area of cuff posteriorly this was done with a FiberWire suture into the push lock just inferior to the lateral row anchors the decompression was adequate. Everything was thoroughly irrigated. The deltoid is reapproximated with 0 Vicryl, subcutaneous tissue with 2-0 Vicryl. The skin was closed with a running 4-0 subcuticular stitch. Sterile dressings Steri-Strips were applied. The portals were closed with 2-0 Vicryl. Sling was applied. The patient was taken to recovery room in good condition all sponge and needle count were correct.          Date: 3/1/2021  Time: 09:17 GT [facility]

## 2021-03-11 NOTE — OP NOTE
Rotator Cuff RepairOperative Note      Facility: [facility]  Patient Name: Christy L Verner  YOB: 1970  Date:03/01/2021  Medical Record Number: [id number]      Pre-op Diagnosis:   Traumatic complete tear of right rotator cuff, subsequent encounter [S46.011D]    Post-Op Diagnosis Codes:     * Traumatic complete tear of right rotator cuff, subsequent encounter [S46.011D]    Procedure(s):  SHOULDER ARTHROSCOPY, DEBRIDEMENT OF LABRUM, DEOMPRESSION, AND MINI OPEN ROTATOR CUFF REPAIR    Surgeon(s):  Leisa Patel MD    Anesthesia: General with Block  Anesthesiologist: Lex Echols MD  CRNA: Salvatore Hicks CRNA    Staff:   Circulator: Laya Parr RN  Scrub Person: Alyx Mejia       @[id number],[email or web address])@    Estimated Blood Loss:10cc    Specimens:       Drains: None    Findings: See Dictation    Complications: None    Indication for procedure:   This patient has had a several month history of right shoulder pain that is been unresponsive to conservative management.  They have an exam and an MRI which are consistent with rotator cuff pathology and they present for arthroscopy and possible repair.  They understand all risks benefits and alternatives.  Risk including but not exclusive to anesthetic complications including death,  MI.  CVA as well as infection bleeding DVT PE stiffness,  failure to relieve their symptoms and need for future surgery.  They understand this and agree to proceed.    Description of procedure:  Patient was taken to the operating room.  They were placed supine on the  operating room table.  After induction of adequate LMA anesthesia and scalene nerve block and IV antibiotics.  They underwent exam under anesthesia was symmetric full range of motion which was symmetric side to side.  They were then placed in the modified beachchair position all prominent areas well padded and head well stabilized.  The arm was prepped and draped  in usual  sterile fashion, bony landmarks were demarcated and the joint was infiltrated with 30 cc of fluid.  A standard posterior portal was made inferior and medial to the posterior lateral edge of the acromion with an 11 blade.  Blunt trocar penetrated into the joint scope following her evaluation began.  An anterior portal was established in the interval between the subscapularis and the biceps tendon with spinal needle localization and direct visualization. She was found have a degenerative tear of the superior aspect of the labrum that was debrided with the Apollo device and the motorized shaver.  The rotator cuff appeared pathologic.    I then exited the space, entered subacromial space. I made accessory lateral portal off the anterior lateral edge of the acromion,placed the shaver and removed thickened bursa. I delineated the anterior lateral edge of the acromion with the Apollo device and removed a large spur with a bone cutter. There was plenty of room under this joint after this for the rotator cuff. alThe rotator cuff was evaluated and was found have a small full-thickness tear.. I did place a retention stitch with the scorpion device. I then exited the space, placed the Kobel retractors and readily identified the rotator cuff tear. The tuberosity was prepared with a rongeur.  1 Arthrex swivel lock anchors preloaded with fiber tape was placed in standard fashion with good bony pullout I tended to place a second 1 however the implant itself bent and I did not feel it had retained an adequate amount of its original structure therefore placed 1 Arthrex push lock anchor preloaded with a FiberWire. A fiber tape was preloaded scorpion device was used to place each of the fiber wires one anterior one posterior. I then placed a cinch stitch anterior and posterior and incorporated this cinch stitch and 2 fiber wires in each of 2 swivel lock anchors on the lateral row.  The decompression was adequate. Everything was  thoroughly irrigated. The deltoid is reapproximated with 0 Vicryl, subcutaneous tissue with 2-0 Vicryl. The skin was closed with a running 4-0 subcuticular stitch. Sterile dressings Steri-Strips were applied. The portals were closed with 2-0 Vicryl. Sling was applied. The patient was taken to recovery room in good condition all sponge and needle count were correct.          Date: 3/11/2021  Time: 08:09 GT [facility]

## 2021-03-12 NOTE — PROGRESS NOTES
Right Shoulder Scope RCR follow Up 1st Visit      Patient: Christy L Verner        YOB: 1970      Chief Complaints: Right shoulder pain      History of Present Illness: Pt is here f/u shoulder arthroscopy rotator cuff repair she is doing well states is better than her last one she is only taking 2 pain pills a day due to the fact she is on contract however they did send us a note preoperatively it so we could manage her pain medicine up for the acute phase in my mind that would be probably after the first month I will give her some more Percocet her orders will be up to 4 pills a day        Allergies: No Known Allergies    Medications:   Home Medications:  Current Outpatient Medications on File Prior to Visit   Medication Sig   • amitriptyline (ELAVIL) 10 MG tablet Take 1 tablet by mouth every night at bedtime.   • famotidine (Pepcid) 20 MG tablet Take 1 tablet by mouth 2 (Two) Times a Day As Needed for Indigestion or Heartburn. (Patient taking differently: Take 20 mg by mouth Daily.)   • methocarbamol (ROBAXIN) 500 MG tablet Take 1 tablet by mouth 4 (Four) Times a Day.   • ondansetron (Zofran) 4 MG tablet Take 1 tablet by mouth Every 8 (Eight) Hours As Needed for Nausea or Vomiting.   • ondansetron ODT (Zofran ODT) 4 MG disintegrating tablet Place 1 tablet on the tongue Every 8 (Eight) Hours As Needed for Nausea or Vomiting.   • oxyCODONE-acetaminophen (PERCOCET) 7.5-325 MG per tablet Take 1-2 tablets by mouth Every 4 (Four) Hours As Needed for severe pain   • pantoprazole (PROTONIX) 40 MG EC tablet Take 1 tablet by mouth 2 (two) times a day.   • sucralfate (CARAFATE) 1 g tablet Take 1 tablet by mouth 4 (Four) Times a Day With Meals & at Bedtime. (Patient taking differently: Take 1 g by mouth As Needed.)     No current facility-administered medications on file prior to visit.     Current Medications:  Scheduled Meds:  Continuous Infusions:No current facility-administered medications for this  visit.    PRN Meds:.          Physical Exam: 50 y.o. female  General Appearance:    Alert, cooperative, in no acute distress                 There were no vitals filed for this visit.   Patient is alert and oriented ×3 no acute distress normal mood physical exam.  Physical exam of the shoulder, incisions looked good there is no erythema,no signs or sx of infection.        Assessment  S/P shoulder scope.  I did review intraoperative findings and arthroscopic pictures with the patient.  We remove sutures today start her into physical therapy and I will see her back 4 weeks

## 2021-03-15 ENCOUNTER — OFFICE VISIT (OUTPATIENT)
Dept: ORTHOPEDIC SURGERY | Facility: CLINIC | Age: 51
End: 2021-03-15

## 2021-03-15 ENCOUNTER — TELEPHONE (OUTPATIENT)
Dept: ORTHOPEDIC SURGERY | Facility: CLINIC | Age: 51
End: 2021-03-15

## 2021-03-15 VITALS — HEIGHT: 64 IN | TEMPERATURE: 98.1 F | BODY MASS INDEX: 34.66 KG/M2 | WEIGHT: 203 LBS

## 2021-03-15 DIAGNOSIS — Z98.890 S/P ROTATOR CUFF REPAIR: Primary | ICD-10-CM

## 2021-03-15 PROCEDURE — 99024 POSTOP FOLLOW-UP VISIT: CPT | Performed by: ORTHOPAEDIC SURGERY

## 2021-03-15 RX ORDER — OXYCODONE AND ACETAMINOPHEN 7.5; 325 MG/1; MG/1
1 TABLET ORAL EVERY 6 HOURS PRN
Qty: 45 TABLET | Refills: 0 | Status: SHIPPED | OUTPATIENT
Start: 2021-03-15 | End: 2021-04-05 | Stop reason: SDUPTHER

## 2021-03-15 NOTE — TELEPHONE ENCOUNTER
Called pt and left a message for her to call back.  She needs to be informed that ADRYAN dictated a letter to her pain management physican.  However, ADRYAN does not think it's needed as we have a note from them stating they are aware that ADRYAN will be temporally prescribing her meds.  If she calls back, she also needs to be inform that ADRYAN sent in a new rx but she would like for her to take no more than 4 per day.

## 2021-03-15 NOTE — TELEPHONE ENCOUNTER
Pt called regarding pain meds.  She mentioned something about a letter about you prescribing her pain med.  I am not exactly clear on what she needs but she said that you would know what it's about.  Please advise.

## 2021-03-19 ENCOUNTER — DOCUMENTATION (OUTPATIENT)
Dept: PHYSICAL THERAPY | Facility: HOSPITAL | Age: 51
End: 2021-03-19

## 2021-03-19 DIAGNOSIS — M54.50 CHRONIC BILATERAL LOW BACK PAIN WITHOUT SCIATICA: Primary | ICD-10-CM

## 2021-03-19 DIAGNOSIS — Z98.890 S/P LEFT ROTATOR CUFF REPAIR: ICD-10-CM

## 2021-03-19 DIAGNOSIS — M25.512 LEFT SHOULDER PAIN, UNSPECIFIED CHRONICITY: ICD-10-CM

## 2021-03-19 DIAGNOSIS — R26.2 DIFFICULTY WALKING: ICD-10-CM

## 2021-03-19 DIAGNOSIS — G89.29 CHRONIC BILATERAL LOW BACK PAIN WITHOUT SCIATICA: Primary | ICD-10-CM

## 2021-03-19 DIAGNOSIS — Z74.09 IMPAIRED MOBILITY: ICD-10-CM

## 2021-03-19 DIAGNOSIS — Z47.89 ORTHOPEDIC AFTERCARE: Primary | ICD-10-CM

## 2021-03-19 NOTE — THERAPY DISCHARGE NOTE
Outpatient Physical Therapy Discharge Summary         Patient Name: Christy L Verner  : 1970  MRN: [id number]    Today's Date: 3/19/2021    Visit Dx:    ICD-10-CM ICD-9-CM   1. Orthopedic aftercare  Z47.89 V54.9   2. Left shoulder pain, unspecified chronicity  M25.512 719.41   3. S/P left rotator cuff repair  Z98.890 V45.89   4. Impaired mobility  Z74.09 799.89       PT OP Goals     Row Name 21 1038          PT Short Term Goals    STG Date to Achieve  05/15/20  -GJ     STG 1  pt. to be I with initial HEP to facilitate self management of their condition  -GJ     STG 1 Progress  Met  -GJ     STG 2  pt. to be educated in/verbalize understanding of the importance of posture/ergonomics in association with their condition to facilitate self management of their condition  -GJ     STG 2 Progress  Partially Met  -GJ     STG 3  pt. to demonstrate L shoulder flexion PROM >/= 0-140 to facilitate ease of performing self care activities  -GJ     STG 3 Progress  Met  -GJ     STG 4  pt to demonstrate L elbow AROM >/= 5-130 to facilitate self care activities  -GJ     STG 4 Progress  Met  -GJ        Long Term Goals    LTG Date to Achieve  20  -GJ     LTG 1  pt. to be I with advanced HEP to facilitate self management of their condition  -GJ     LTG 1 Progress  Partially Met  -GJ     LTG 2  pt. to report a quick DASH </= 30 to demonstrate decreased level of perceived disability  -GJ     LTG 2 Progress  Not Met  -GJ     LTG 3  pt. to demonstrate placing/retrieving small object from an above the shoulder level shelf with her LUE to facilitate ease of performing household/work related activities  -GJ     LTG 3 Progress  Not Met  -GJ     LTG 4  pt. to demonstrate L shoulder flexion/abd  AROM >/= 0-140 to facilitate ease of performing functional/household activities  -GJ     LTG 4 Progress  Met  -GJ     LTG 5  pt. to demonstrate L shoulder AROM FIR >/= mid line L 1to facilitate ease of performing self care/dressing  activities  -     LTG 5 Progress  Not Met  -BOBO       User Key  (r) = Recorded By, (t) = Taken By, (c) = Cosigned By    Initials Name Provider Type    Jorge Bernal, PT Physical Therapist          OP PT Discharge Summary  Date of Discharge: 03/19/21  Reason for Discharge: other (comment) (did not return)  Outcomes Achieved: Patient able to partially acheive established goals  Discharge Destination: Unknown  Discharge Instructions/Additional Comments: Ms. Verner attended 5 sessions of physical therapy from 5/20/2021-6/11/2021 following her L RC repair. See ortho for updated ranges. She did not return to physical therapy followiing her 6/11/2021 session. She stated MD had released her to return to work.  Ms. Verner is discharged from outpatient physical therapy secondary to not returning      Time Calculation:                    Jorge Oro, PT  3/19/2021

## 2021-03-19 NOTE — THERAPY DISCHARGE NOTE
Outpatient Physical Therapy Discharge Summary         Patient Name: Christy L Verner  : 1970  MRN: [id number]    Today's Date: 3/19/2021    Visit Dx:    ICD-10-CM ICD-9-CM   1. Chronic bilateral low back pain without sciatica  M54.5 724.2    G89.29 338.29   2. Difficulty walking  R26.2 719.7       PT OP Goals     Row Name 21 1000          PT Short Term Goals    STG Date to Achieve  21  -GJ     STG 1  The pt will demonstrate IND and compliant with initial HEP.  -GJ     STG 1 Progress  Partially Met  -GJ     STG 2  The pt will be able to walk into her house after working with no more than 5 min break in car after arriving home.  -GJ     STG 2 Progress  Partially Met  -GJ        Long Term Goals    LTG Date to Achieve  21  -GJ     LTG 1  The pt will demonstrate IND and compliant with progressive HEP focused on IND condition management and return to PLOF.  -GJ     LTG 1 Progress  Not Met  -GJ     LTG 2  The pt will score less than or equal to 45% disability on the modified Oswestry to indicate improved perceived performance of ADLs.  -GJ     LTG 2 Progress  Not Met  -GJ     LTG 3  The pt will demonstrate understanding of safe lifting/bending mechanics for improved safety and decreased pain during work performance.  -GJ     LTG 3 Progress  Not Met  -GJ     LTG 4  The pt will report pain no more than 7/10 with full day of work to facilitate improved work performance.  -GJ     LTG 4 Progress  Not Met  -GJ     LTG 5  The pt will report at least 60% improvement in pain when first standing.walking after sitting to facilitate improved transitional position performance.  -GJ     LTG 5 Progress  Not Met  -GJ       User Key  (r) = Recorded By, (t) = Taken By, (c) = Cosigned By    Initials Name Provider Type    Jorge Bernal, PT Physical Therapist          OP PT Discharge Summary  Date of Discharge: 21  Reason for Discharge: Non-compliant  Outcomes Achieved: Patient able to partially  catrina established goals  Discharge Destination: Unknown  Discharge Instructions/Additional Comments: Ms. Verner attended 3 sessions of physical therapy from 12/18/2020-1/20/2021 for her LBP. She did not return following her last session. Ms. Verner is discharged from outpatient physical therapy secondry to not returning.      Time Calculation:                    Jorge Oro, PT  3/19/2021

## 2021-03-22 RX ORDER — ONDANSETRON 4 MG/1
4 TABLET, FILM COATED ORAL EVERY 8 HOURS PRN
Qty: 20 TABLET | Refills: 0 | Status: SHIPPED | OUTPATIENT
Start: 2021-03-22 | End: 2021-04-09 | Stop reason: SDUPTHER

## 2021-03-26 ENCOUNTER — TELEPHONE (OUTPATIENT)
Dept: ORTHOPEDIC SURGERY | Facility: CLINIC | Age: 51
End: 2021-03-26

## 2021-03-26 NOTE — TELEPHONE ENCOUNTER
Patient called very upset crying stating that she is having left shoulder pain and is unable to lift her arm. Patient normally sees [facility] for care and had surgery on the left shoulder in February of 2020.

## 2021-03-30 NOTE — TELEPHONE ENCOUNTER
I would be happy to see her this week while Dr. Patel is out, or we can schedule her for when Dr. Patel returns.    Jerica, can you call her and see if she would like an appointment with me?    Thanks!

## 2021-04-02 ENCOUNTER — BULK ORDERING (OUTPATIENT)
Dept: CASE MANAGEMENT | Facility: OTHER | Age: 51
End: 2021-04-02

## 2021-04-02 DIAGNOSIS — Z23 IMMUNIZATION DUE: ICD-10-CM

## 2021-04-05 DIAGNOSIS — Z98.890 S/P ROTATOR CUFF REPAIR: ICD-10-CM

## 2021-04-06 RX ORDER — OXYCODONE AND ACETAMINOPHEN 7.5; 325 MG/1; MG/1
1 TABLET ORAL EVERY 6 HOURS PRN
Qty: 45 TABLET | Refills: 0 | Status: SHIPPED | OUTPATIENT
Start: 2021-04-06

## 2021-04-09 RX ORDER — ONDANSETRON 4 MG/1
4 TABLET, FILM COATED ORAL EVERY 8 HOURS PRN
Qty: 20 TABLET | Refills: 0 | Status: SHIPPED | OUTPATIENT
Start: 2021-04-09 | End: 2021-07-07

## 2021-04-20 DIAGNOSIS — Z98.890 STATUS POST ROTATOR CUFF REPAIR: Primary | ICD-10-CM

## 2021-04-20 DIAGNOSIS — Z98.890 S/P ROTATOR CUFF REPAIR: ICD-10-CM

## 2021-04-20 RX ORDER — OXYCODONE AND ACETAMINOPHEN 7.5; 325 MG/1; MG/1
1 TABLET ORAL EVERY 6 HOURS PRN
Qty: 45 TABLET | Refills: 0 | Status: CANCELLED | OUTPATIENT
Start: 2021-04-20

## 2021-04-20 RX ORDER — OXYCODONE HYDROCHLORIDE AND ACETAMINOPHEN 5; 325 MG/1; MG/1
1-2 TABLET ORAL EVERY 6 HOURS PRN
Qty: 45 TABLET | Refills: 0 | Status: SHIPPED | OUTPATIENT
Start: 2021-04-20 | End: 2021-05-03 | Stop reason: SDUPTHER

## 2021-04-20 RX ORDER — OXYCODONE AND ACETAMINOPHEN 7.5; 325 MG/1; MG/1
1 TABLET ORAL EVERY 6 HOURS PRN
Qty: 45 TABLET | Refills: 0 | OUTPATIENT
Start: 2021-04-20

## 2021-04-28 NOTE — TELEPHONE ENCOUNTER
Had virtual visit on Monday. Says her stomach and head are still hurting bad. Feeling very weak. Has been taking pepcid for abdominal pain that is not helping. Tylenol and pain pills are not helping headache. Since Monday, chest has starting hurting in the middle. Feeling a little short of breath. Had covid but last test was negative. Has a residual cough that is getting worse. She has decided to go to ER to be checked out as she is getting worse by the day. ALVARO   
What Type Of Note Output Would You Prefer (Optional)?: Bullet Format
How Severe Are Your Spot(S)?: moderate
Have Your Spot(S) Been Treated In The Past?: has not been treated
Hpi Title: Evaluation of a Skin Lesion

## 2021-05-03 ENCOUNTER — OFFICE VISIT (OUTPATIENT)
Dept: ORTHOPEDIC SURGERY | Facility: CLINIC | Age: 51
End: 2021-05-03

## 2021-05-03 VITALS — HEIGHT: 64 IN | TEMPERATURE: 97.1 F | WEIGHT: 203 LBS | BODY MASS INDEX: 34.66 KG/M2

## 2021-05-03 DIAGNOSIS — Z98.890 S/P ROTATOR CUFF REPAIR: Primary | ICD-10-CM

## 2021-05-03 PROCEDURE — 99024 POSTOP FOLLOW-UP VISIT: CPT | Performed by: ORTHOPAEDIC SURGERY

## 2021-05-03 RX ORDER — OXYCODONE HYDROCHLORIDE AND ACETAMINOPHEN 5; 325 MG/1; MG/1
1 TABLET ORAL EVERY 8 HOURS PRN
Qty: 45 TABLET | Refills: 0 | Status: SHIPPED | OUTPATIENT
Start: 2021-05-03 | End: 2021-06-02

## 2021-05-03 NOTE — PROGRESS NOTES
Shoulder Scope RCR follow Up       Patient: Christy L Verner        YOB: 1970      Chief Complaints: shoulder pain right is about 6 weeks out now.  She states she is unable to go to therapy due to the cost.  I told her how important therapy was      History of Present Illness: Pt is here f/u shoulder arthroscopy, RCR following shoulder surgery.  She did have the other one done she does somewhat know what to do think some of her persistent pain is due to her stiffness and loss of range of motion        Allergies: No Known Allergies    Medications:   Home Medications:  Current Outpatient Medications on File Prior to Visit   Medication Sig   • amitriptyline (ELAVIL) 10 MG tablet Take 1 tablet by mouth every night at bedtime.   • amitriptyline (ELAVIL) 10 MG tablet Take 1 tablet by mouth every night at bedtime as needed   • famotidine (Pepcid) 20 MG tablet Take 1 tablet by mouth 2 (Two) Times a Day As Needed for Indigestion or Heartburn. (Patient taking differently: Take 20 mg by mouth Daily.)   • methocarbamol (ROBAXIN) 500 MG tablet Take 1 tablet by mouth 4 (Four) Times a Day.   • ondansetron (Zofran) 4 MG tablet Take 1 tablet by mouth Every 8 (Eight) Hours As Needed for Nausea or Vomiting.   • ondansetron ODT (Zofran ODT) 4 MG disintegrating tablet Place 1 tablet on the tongue Every 8 (Eight) Hours As Needed for Nausea or Vomiting.   • oxyCODONE-acetaminophen (PERCOCET) 5-325 MG per tablet Take 1-2 tablets by mouth Every 6 (Six) Hours As Needed for severe pain   • oxyCODONE-acetaminophen (PERCOCET) 7.5-325 MG per tablet Take 1 tablet by mouth Every 6 (Six) Hours As Needed for severe pain   • pantoprazole (PROTONIX) 40 MG EC tablet Take 1 tablet by mouth 2 (two) times a day.   • sucralfate (CARAFATE) 1 g tablet Take 1 tablet by mouth 4 (Four) Times a Day With Meals & at Bedtime. (Patient taking differently: Take 1 g by mouth As Needed.)     No current facility-administered medications on file prior to  visit.     Current Medications:  Scheduled Meds:  Continuous Infusions:No current facility-administered medications for this visit.    PRN Meds:.          Physical Exam: 50 y.o. female  General Appearance:    Alert, cooperative, in no acute distress                 There were no vitals filed for this visit.   Patient is alert and oriented ×3 no acute distress normal mood physical exam.  Physical exam of the shoulder, incisions looked good there is no erythema,no signs or sx of infection.  Patient is quite stiff passively I can get her to about 140 external rotation to 30 rotator cuff strength is 4+/5    Assessment  S/P shoulder scope, rotator cuff repair, she is very stiff and I do think absolutely she needs physical therapy.  She states she just cannot afford it we did talk about things to do on her own even though not ideal it is better than nothing.  Want her to really work on range of motion I will see her back in 3 weeks to check

## 2021-05-24 ENCOUNTER — OFFICE VISIT (OUTPATIENT)
Dept: ORTHOPEDIC SURGERY | Facility: CLINIC | Age: 51
End: 2021-05-24

## 2021-05-24 VITALS — WEIGHT: 203 LBS | TEMPERATURE: 97.7 F | BODY MASS INDEX: 34.66 KG/M2 | HEIGHT: 64 IN

## 2021-05-24 DIAGNOSIS — Z98.890 S/P ROTATOR CUFF REPAIR: Primary | ICD-10-CM

## 2021-05-24 PROCEDURE — 99212 OFFICE O/P EST SF 10 MIN: CPT | Performed by: ORTHOPAEDIC SURGERY

## 2021-05-24 PROCEDURE — 20610 DRAIN/INJ JOINT/BURSA W/O US: CPT | Performed by: ORTHOPAEDIC SURGERY

## 2021-05-24 RX ADMIN — LIDOCAINE HYDROCHLORIDE 4 ML: 20 INJECTION, SOLUTION EPIDURAL; INFILTRATION; INTRACAUDAL; PERINEURAL at 10:47

## 2021-05-24 RX ADMIN — METHYLPREDNISOLONE ACETATE 80 MG: 80 INJECTION, SUSPENSION INTRA-ARTICULAR; INTRALESIONAL; INTRAMUSCULAR; SOFT TISSUE at 10:47

## 2021-05-27 RX ORDER — LIDOCAINE HYDROCHLORIDE 20 MG/ML
4 INJECTION, SOLUTION EPIDURAL; INFILTRATION; INTRACAUDAL; PERINEURAL
Status: COMPLETED | OUTPATIENT
Start: 2021-05-24 | End: 2021-05-24

## 2021-05-27 RX ORDER — METHYLPREDNISOLONE ACETATE 80 MG/ML
80 INJECTION, SUSPENSION INTRA-ARTICULAR; INTRALESIONAL; INTRAMUSCULAR; SOFT TISSUE
Status: COMPLETED | OUTPATIENT
Start: 2021-05-24 | End: 2021-05-24

## 2021-06-02 ENCOUNTER — TELEPHONE (OUTPATIENT)
Dept: ORTHOPEDIC SURGERY | Facility: CLINIC | Age: 51
End: 2021-06-02

## 2021-06-02 NOTE — TELEPHONE ENCOUNTER
Caller: CHRISTY VERNER  Relationship to Patient: SELF     Phone Number: [phone]  Reason for Call:PATIENT CALLING TO CHECK ON FMLA PAPERWORK

## 2021-06-18 NOTE — PROGRESS NOTES
Right Shoulder Scope RCR follow Up       Patient: Christy L Verner        YOB: 1970      Chief Complaints: Right shoulder pain      History of Present Illness: Pt is here f/u shoulder arthroscopy, RCR on right she was pretty limited in her motion and I wanted her to get the therapy she has not done it I think it is more of financial issues she still pretty stiff she is able to go back to work on a limited fashion feels like she can do it and wants to do that        Allergies: No Known Allergies    Medications:   Home Medications:  Current Outpatient Medications on File Prior to Visit   Medication Sig   • amitriptyline (ELAVIL) 10 MG tablet Take 1 tablet by mouth every night at bedtime.   • amitriptyline (ELAVIL) 10 MG tablet Take 1 tablet by mouth every night at bedtime as needed   • famotidine (Pepcid) 20 MG tablet Take 1 tablet by mouth 2 (Two) Times a Day As Needed for Indigestion or Heartburn. (Patient taking differently: Take 20 mg by mouth Daily.)   • methocarbamol (ROBAXIN) 500 MG tablet Take 1 tablet by mouth 4 (Four) Times a Day.   • ondansetron (Zofran) 4 MG tablet Take 1 tablet by mouth Every 8 (Eight) Hours As Needed for Nausea or Vomiting.   • ondansetron ODT (Zofran ODT) 4 MG disintegrating tablet Place 1 tablet on the tongue Every 8 (Eight) Hours As Needed for Nausea or Vomiting.   • oxyCODONE-acetaminophen (PERCOCET) 5-325 MG per tablet take 1 tablet by mouth twice daily as needed   • oxyCODONE-acetaminophen (PERCOCET) 7.5-325 MG per tablet Take 1 tablet by mouth Every 6 (Six) Hours As Needed for severe pain   • pantoprazole (PROTONIX) 40 MG EC tablet Take 1 tablet by mouth 2 (two) times a day.   • sucralfate (CARAFATE) 1 g tablet Take 1 tablet by mouth 4 (Four) Times a Day With Meals & at Bedtime. (Patient taking differently: Take 1 g by mouth As Needed.)     No current facility-administered medications on file prior to visit.     Current Medications:  Scheduled Meds:  Continuous  Infusions:No current facility-administered medications for this visit.    PRN Meds:.          Physical Exam: 50 y.o. female  General Appearance:    Alert, cooperative, in no acute distress                 There were no vitals filed for this visit.   Patient is alert and oriented ×3 no acute distress normal mood physical exam.  Physical exam of the shoulder, incisions looked good there is no erythema,no signs or sx of infection.    Range of motion is still limited passively get her to about 150 external rotation to 40 rotator cuff strength is reasonable at 4+/5      Assessment  S/P shoulder scope, rotator cuff repair, I think the repair is doing well I think she is quite stiff which is really worsening her symptoms discussed with her again the need for physical therapy she states she is working on her own in her pool but I told her she really has to work on that passive range of motion.  She wants to return to work with strict limitations which I will give her all of which are outlined in her return to work letter I will see her back 4 weeks

## 2021-06-21 ENCOUNTER — OFFICE VISIT (OUTPATIENT)
Dept: ORTHOPEDIC SURGERY | Facility: CLINIC | Age: 51
End: 2021-06-21

## 2021-06-21 VITALS — BODY MASS INDEX: 34.49 KG/M2 | TEMPERATURE: 98 F | HEIGHT: 64 IN | WEIGHT: 202 LBS

## 2021-06-21 DIAGNOSIS — Z98.890 S/P ROTATOR CUFF REPAIR: Primary | ICD-10-CM

## 2021-06-21 PROCEDURE — 99212 OFFICE O/P EST SF 10 MIN: CPT | Performed by: ORTHOPAEDIC SURGERY

## 2021-06-24 NOTE — TELEPHONE ENCOUNTER
"voicemail for patient to contact me; I have no papers by fax yet to complete.    

## 2021-07-07 RX ORDER — ONDANSETRON 4 MG/1
4 TABLET, ORALLY DISINTEGRATING ORAL EVERY 8 HOURS PRN
Qty: 21 TABLET | Refills: 3 | Status: SHIPPED | OUTPATIENT
Start: 2021-07-07

## 2021-07-07 RX ORDER — FAMOTIDINE 20 MG/1
20 TABLET, FILM COATED ORAL 2 TIMES DAILY PRN
Qty: 60 TABLET | Refills: 0 | Status: CANCELLED | OUTPATIENT
Start: 2021-07-07

## 2021-07-07 RX ORDER — PANTOPRAZOLE SODIUM 40 MG/1
40 TABLET, DELAYED RELEASE ORAL 2 TIMES DAILY
Qty: 60 TABLET | Refills: 5 | Status: SHIPPED | OUTPATIENT
Start: 2021-07-07

## 2021-07-08 ENCOUNTER — TELEPHONE (OUTPATIENT)
Dept: ORTHOPEDIC SURGERY | Facility: CLINIC | Age: 51
End: 2021-07-08

## 2021-07-08 NOTE — TELEPHONE ENCOUNTER
Caller: KAMRYN GATES    Relationship:  [employer]    Best call back number: [phone]    What form or medical record are you requesting: WORK RELEASE NOTE    Who is requesting this form or medical record from you:  [employer]    How would you like to receive the form or medical records (pick-up, mail, fax):   [phone]    Timeframe paperwork needed:  AS SOON AS POSSIBLE    Additional notes:  KAMRYN GATES W/ [employer] HAS REQUESTED WORK RELEASE NOTE SO HE MAY SEND TO HR

## 2021-07-09 RX ORDER — FAMOTIDINE 20 MG/1
20 TABLET, FILM COATED ORAL 2 TIMES DAILY PRN
Qty: 60 TABLET | Refills: 0 | Status: SHIPPED | OUTPATIENT
Start: 2021-07-09

## 2021-07-09 NOTE — TELEPHONE ENCOUNTER
07/09/21-12:23P.ANY GATES, MANAGER AT [facility] [employer]  CALLED BACK.   STATES TO CANCEL THIS REQUEST.   HE DOES NOT NEED IT FAXED AGAIN

## 2021-07-09 NOTE — TELEPHONE ENCOUNTER
07/09/21-10:54A.TORY GATES, MANAGER AT [employer] ASKED IF RETURN TO WORK LETTER CAN BE FAXED TO   FAX# [phone] -THIS WILL GO DIRECTLY TO HIM.

## 2022-01-02 ENCOUNTER — DOCUMENTATION (OUTPATIENT)
Dept: FAMILY MEDICINE CLINIC | Facility: CLINIC | Age: 52
End: 2022-01-02

## 2022-01-02 DIAGNOSIS — U07.1 COVID-19 VIRUS INFECTION: Primary | ICD-10-CM

## 2022-01-02 RX ORDER — ALBUTEROL SULFATE 90 UG/1
2 AEROSOL, METERED RESPIRATORY (INHALATION) EVERY 4 HOURS PRN
Qty: 6.7 G | Refills: 12 | Status: SHIPPED | OUTPATIENT
Start: 2022-01-02

## 2022-01-02 RX ORDER — IBUPROFEN 800 MG/1
800 TABLET ORAL EVERY 8 HOURS PRN
Qty: 45 TABLET | Refills: 0 | Status: SHIPPED | OUTPATIENT
Start: 2022-01-02 | End: 2022-01-02 | Stop reason: SDUPTHER

## 2022-01-02 RX ORDER — ALBUTEROL SULFATE 90 UG/1
2 AEROSOL, METERED RESPIRATORY (INHALATION) EVERY 4 HOURS PRN
Qty: 6.7 G | Refills: 12 | Status: SHIPPED | OUTPATIENT
Start: 2022-01-02 | End: 2022-01-02 | Stop reason: SDUPTHER

## 2022-01-02 RX ORDER — DEXTROMETHORPHAN HYDROBROMIDE AND PROMETHAZINE HYDROCHLORIDE 15; 6.25 MG/5ML; MG/5ML
SYRUP ORAL
Qty: 180 ML | Refills: 0 | Status: SHIPPED | OUTPATIENT
Start: 2022-01-02

## 2022-01-02 RX ORDER — DEXTROMETHORPHAN HYDROBROMIDE AND PROMETHAZINE HYDROCHLORIDE 15; 6.25 MG/5ML; MG/5ML
SYRUP ORAL
Qty: 180 ML | Refills: 0 | Status: SHIPPED | OUTPATIENT
Start: 2022-01-02 | End: 2022-01-02 | Stop reason: SDUPTHER

## 2022-01-02 RX ORDER — IBUPROFEN 800 MG/1
800 TABLET ORAL EVERY 8 HOURS PRN
Qty: 45 TABLET | Refills: 0 | Status: SHIPPED | OUTPATIENT
Start: 2022-01-02

## 2024-10-04 ENCOUNTER — APPOINTMENT (OUTPATIENT)
Dept: GENERAL RADIOLOGY | Facility: HOSPITAL | Age: 54
End: 2024-10-04
Payer: COMMERCIAL

## 2024-10-04 ENCOUNTER — HOSPITAL ENCOUNTER (EMERGENCY)
Facility: HOSPITAL | Age: 54
Discharge: HOME | End: 2024-10-04
Attending: EMERGENCY MEDICINE
Payer: COMMERCIAL

## 2024-10-04 ENCOUNTER — APPOINTMENT (OUTPATIENT)
Dept: CT IMAGING | Facility: HOSPITAL | Age: 54
End: 2024-10-04
Payer: COMMERCIAL

## 2024-10-04 VITALS
SYSTOLIC BLOOD PRESSURE: 113 MMHG | HEIGHT: 63 IN | OXYGEN SATURATION: 98 % | WEIGHT: 187 LBS | BODY MASS INDEX: 33.13 KG/M2 | RESPIRATION RATE: 24 BRPM | DIASTOLIC BLOOD PRESSURE: 85 MMHG | HEART RATE: 91 BPM | TEMPERATURE: 97.5 F

## 2024-10-04 DIAGNOSIS — R10.10 UPPER ABDOMINAL PAIN: Primary | ICD-10-CM

## 2024-10-04 DIAGNOSIS — R79.89 ELEVATED D-DIMER: ICD-10-CM

## 2024-10-04 LAB
ALBUMIN SERPL-MCNC: 4 G/DL (ref 3.5–5.2)
ALBUMIN/GLOB SERPL: 1.3 G/DL
ALP SERPL-CCNC: 63 U/L (ref 39–117)
ALT SERPL W P-5'-P-CCNC: 13 U/L (ref 1–33)
ANION GAP SERPL CALCULATED.3IONS-SCNC: 10.8 MMOL/L (ref 5–15)
AST SERPL-CCNC: 16 U/L (ref 1–32)
BASOPHILS # BLD AUTO: 0.02 10*3/MM3 (ref 0–0.2)
BASOPHILS NFR BLD AUTO: 0.3 % (ref 0–1.5)
BILIRUB SERPL-MCNC: 0.4 MG/DL (ref 0–1.2)
BUN SERPL-MCNC: 12 MG/DL (ref 6–20)
BUN/CREAT SERPL: 14.6 (ref 7–25)
CALCIUM SPEC-SCNC: 9 MG/DL (ref 8.6–10.5)
CHLORIDE SERPL-SCNC: 102 MMOL/L (ref 98–107)
CO2 SERPL-SCNC: 25.2 MMOL/L (ref 22–29)
CREAT SERPL-MCNC: 0.82 MG/DL (ref 0.57–1)
D DIMER PPP FEU-MCNC: 0.59 MCGFEU/ML (ref 0–0.53)
DEPRECATED RDW RBC AUTO: 44.4 FL (ref 37–54)
EGFRCR SERPLBLD CKD-EPI 2021: 85.7 ML/MIN/1.73
EOSINOPHIL # BLD AUTO: 0.03 10*3/MM3 (ref 0–0.4)
EOSINOPHIL NFR BLD AUTO: 0.4 % (ref 0.3–6.2)
ERYTHROCYTE [DISTWIDTH] IN BLOOD BY AUTOMATED COUNT: 13.6 % (ref 12.3–15.4)
GEN 5 2HR TROPONIN T REFLEX: 9 NG/L
GLOBULIN UR ELPH-MCNC: 3 GM/DL
GLUCOSE SERPL-MCNC: 87 MG/DL (ref 65–99)
HCT VFR BLD AUTO: 35.2 % (ref 34–46.6)
HGB BLD-MCNC: 11 G/DL (ref 12–15.9)
IMM GRANULOCYTES # BLD AUTO: 0.03 10*3/MM3 (ref 0–0.05)
IMM GRANULOCYTES NFR BLD AUTO: 0.4 % (ref 0–0.5)
INR PPP: 1.16 (ref 0.9–1.1)
LYMPHOCYTES # BLD AUTO: 3 10*3/MM3 (ref 0.7–3.1)
LYMPHOCYTES NFR BLD AUTO: 39.4 % (ref 19.6–45.3)
MCH RBC QN AUTO: 28.1 PG (ref 26.6–33)
MCHC RBC AUTO-ENTMCNC: 31.3 G/DL (ref 31.5–35.7)
MCV RBC AUTO: 90 FL (ref 79–97)
MONOCYTES # BLD AUTO: 0.72 10*3/MM3 (ref 0.1–0.9)
MONOCYTES NFR BLD AUTO: 9.4 % (ref 5–12)
NEUTROPHILS NFR BLD AUTO: 3.82 10*3/MM3 (ref 1.7–7)
NEUTROPHILS NFR BLD AUTO: 50.1 % (ref 42.7–76)
NRBC BLD AUTO-RTO: 0 /100 WBC (ref 0–0.2)
PLATELET # BLD AUTO: 204 10*3/MM3 (ref 140–450)
PMV BLD AUTO: 11.3 FL (ref 6–12)
POTASSIUM SERPL-SCNC: 3.6 MMOL/L (ref 3.5–5.2)
PROT SERPL-MCNC: 7 G/DL (ref 6–8.5)
PROTHROMBIN TIME: 15.1 SECONDS (ref 11.7–14.2)
QT INTERVAL: 356 MS
QTC INTERVAL: 434 MS
RBC # BLD AUTO: 3.91 10*6/MM3 (ref 3.77–5.28)
SODIUM SERPL-SCNC: 138 MMOL/L (ref 136–145)
TROPONIN T DELTA: NORMAL
TROPONIN T SERPL HS-MCNC: <6 NG/L
WBC NRBC COR # BLD AUTO: 7.62 10*3/MM3 (ref 3.4–10.8)

## 2024-10-04 PROCEDURE — 85610 PROTHROMBIN TIME: CPT | Performed by: EMERGENCY MEDICINE

## 2024-10-04 PROCEDURE — 71275 CT ANGIOGRAPHY CHEST: CPT

## 2024-10-04 PROCEDURE — 36415 COLL VENOUS BLD VENIPUNCTURE: CPT | Performed by: EMERGENCY MEDICINE

## 2024-10-04 PROCEDURE — 99285 EMERGENCY DEPT VISIT HI MDM: CPT

## 2024-10-04 PROCEDURE — 85379 FIBRIN DEGRADATION QUANT: CPT | Performed by: EMERGENCY MEDICINE

## 2024-10-04 PROCEDURE — 93005 ELECTROCARDIOGRAM TRACING: CPT | Performed by: EMERGENCY MEDICINE

## 2024-10-04 PROCEDURE — 96375 TX/PRO/DX INJ NEW DRUG ADDON: CPT

## 2024-10-04 PROCEDURE — 80053 COMPREHEN METABOLIC PANEL: CPT | Performed by: EMERGENCY MEDICINE

## 2024-10-04 PROCEDURE — 85025 COMPLETE CBC W/AUTO DIFF WBC: CPT | Performed by: EMERGENCY MEDICINE

## 2024-10-04 PROCEDURE — 71045 X-RAY EXAM CHEST 1 VIEW: CPT

## 2024-10-04 PROCEDURE — 96374 THER/PROPH/DIAG INJ IV PUSH: CPT

## 2024-10-04 PROCEDURE — 84484 ASSAY OF TROPONIN QUANT: CPT | Performed by: EMERGENCY MEDICINE

## 2024-10-04 PROCEDURE — 25010000002 DROPERIDOL PER 5 MG: Performed by: EMERGENCY MEDICINE

## 2024-10-04 PROCEDURE — 25510000001 IOPAMIDOL PER 1 ML: Performed by: EMERGENCY MEDICINE

## 2024-10-04 PROCEDURE — 93010 ELECTROCARDIOGRAM REPORT: CPT | Performed by: INTERNAL MEDICINE

## 2024-10-04 RX ORDER — IOPAMIDOL 755 MG/ML
100 INJECTION, SOLUTION INTRAVASCULAR
Status: COMPLETED | OUTPATIENT
Start: 2024-10-04 | End: 2024-10-04

## 2024-10-04 RX ORDER — FAMOTIDINE 20 MG/1
20 TABLET, FILM COATED ORAL 2 TIMES DAILY PRN
Qty: 60 TABLET | Refills: 0 | Status: SHIPPED | OUTPATIENT
Start: 2024-10-04

## 2024-10-04 RX ORDER — DROPERIDOL 2.5 MG/ML
2.5 INJECTION, SOLUTION INTRAMUSCULAR; INTRAVENOUS ONCE
Status: COMPLETED | OUTPATIENT
Start: 2024-10-04 | End: 2024-10-04

## 2024-10-04 RX ORDER — PANTOPRAZOLE SODIUM 40 MG/10ML
80 INJECTION, POWDER, LYOPHILIZED, FOR SOLUTION INTRAVENOUS ONCE
Status: COMPLETED | OUTPATIENT
Start: 2024-10-04 | End: 2024-10-04

## 2024-10-04 RX ORDER — ALUMINA, MAGNESIA, AND SIMETHICONE 2400; 2400; 240 MG/30ML; MG/30ML; MG/30ML
15 SUSPENSION ORAL ONCE
Status: COMPLETED | OUTPATIENT
Start: 2024-10-04 | End: 2024-10-04

## 2024-10-04 RX ADMIN — PANTOPRAZOLE SODIUM 80 MG: 40 INJECTION, POWDER, FOR SOLUTION INTRAVENOUS at 12:35

## 2024-10-04 RX ADMIN — ALUMINUM HYDROXIDE, MAGNESIUM HYDROXIDE, DIMETHICONE 15 ML: 400; 400; 40 SUSPENSION ORAL at 12:36

## 2024-10-04 RX ADMIN — DROPERIDOL 2.5 MG: 2.5 INJECTION, SOLUTION INTRAMUSCULAR; INTRAVENOUS at 15:22

## 2024-10-04 RX ADMIN — IOPAMIDOL 95 ML: 755 INJECTION, SOLUTION INTRAVENOUS at 15:13

## 2024-10-04 NOTE — ED NOTES
[facility] arrives [facility] from work for complaints of CP that radiated into her jaw. Patient describes chest pain as a burning sensation. Patient has a history of GERD. Alert and oriented x4.

## 2024-10-04 NOTE — ED PROVIDER NOTES
" EMERGENCY DEPARTMENT ENCOUNTER  Room Number:  29/29  PCP: Provider, No Known  Independent Historians: Patient, EMS who brought patient, sister at bedside      HPI:  Chief Complaint: had concerns including Chest Pain, Nausea, and Vomiting.  \"Heartburn\"    A complete HPI/ROS/PMH/PSH/SH/FH are unobtainable due to:   Chronic or social conditions impacting patient care (Social Determinants of Health):       Context: Christy L Verner is a 54 y.o. female with a medical history of obesity, anxiety and depression, GERD who presents to the ED c/o acute burning in her chest.  Patient states she has had \"heartburn\" ongoing over the last several days but worsened significantly this morning at work around 8:00.  Pain is described as a burning in the upper chest and is fairly constant.  It is worsened by nothing, improved by nothing.  Patient was given nitroglycerin by EMS and route without significant change.  Patient denies recent fever.  Denies shortness of breath.  No nausea vomiting or diarrhea.  No black or tarry stools.  Patient does not smoke or abuse alcohol.  She works at a kitchen    Review of prior external notes (non-ED) -and- Review of prior external test results outside of this encounter:   I reviewed prior medical records including most recent office visit with primary care provider, Dr. Whalen.  Patient seen for obesity and started on a semaglutide preparation.  Also has history of anxiety and depression.      Prescription drug monitoring program review:         PAST MEDICAL HISTORY  Active Ambulatory Problems     Diagnosis Date Noted    Injury of left knee 04/25/2018    H/O thyroidectomy 06/02/2014    Menopause 03/21/2019    Complete tear of right rotator cuff 11/13/2019    Incomplete tear of left rotator cuff 11/13/2019    Traumatic incomplete tear of left rotator cuff 02/10/2020    Status post rotator cuff repair 04/07/2020    S/P rotator cuff repair 05/14/2020    Epigastric pain 10/27/2020    Nausea " 10/27/2020    Pain of upper abdomen 2021    Constipation 2021    Screening for colorectal cancer 2021    Traumatic complete tear of right rotator cuff 2021     Resolved Ambulatory Problems     Diagnosis Date Noted    No Resolved Ambulatory Problems     Past Medical History:   Diagnosis Date    Abdominal pain     Anesthesia     Arthritis     Disease of thyroid gland     GERD (gastroesophageal reflux disease)     PONV (postoperative nausea and vomiting)     Right shoulder pain     Slow to wake up after anesthesia     Torn rotator cuff          PAST SURGICAL HISTORY  Past Surgical History:   Procedure Laterality Date    APPENDECTOMY      BUNIONECTOMY Bilateral      SECTION      COLONOSCOPY N/A 2021    Procedure: COLONOSCOPY into cecum and normal terminal ileum with hot snare polypectomy and clip placementx1;  Surgeon: Lele Christian MD;  Location: [facility] ENDOSCOPY;  Service: Gastroenterology;  Laterality: N/A;  pre: abd pain  post:polyp, hemorrhoids    ENDOSCOPY N/A 2020    Procedure: ESOPHAGOGASTRODUODENOSCOPY WITH BIOPSIES;  Surgeon: Lele Christian MD;  Location: [facility] ENDOSCOPY;  Service: Gastroenterology;  Laterality: N/A;  PRE- REFLUX, EPIGASTRIC PAIN, NAUSEA  POST- HIATAL HERNIA, GASTRITIS    HAND SURGERY Left     KNEE ARTHROSCOPY Left 2018    Procedure: LEFT KNEE ARTHROSCOPY, EXTENSIVE CHONDROPLASTY, PARTIAL LATERAL MENISECTOMY;  Surgeon: Moe Martínez MD;  [facility] [place] [facility];  Service: Orthopedics    KNEE MENISCAL REPAIR Left     SHOULDER ARTHROSCOPY W/ ROTATOR CUFF REPAIR Left 2020    Procedure: SHOULDER ARTHROSCOPY WITH ROTATOR CUFF REPAIR, DEBRIDEMENT OF LABRUM, DECOMPRESSION;  Surgeon: Leisa Patel MD;  [facility]: [facility] [place] [facility];  Service: Orthopedics;  Laterality: Left;    SHOULDER ARTHROSCOPY W/ ROTATOR CUFF REPAIR Right 3/1/2021    Procedure: SHOULDER ARTHROSCOPY, DEBRIDEMENT OF LABRUM, DEOMPRESSION, AND MINI OPEN ROTATOR  CUFF REPAIR;  Surgeon: Leisa Patel MD;  [place] [facility] [place] [facility]  Service: Orthopedics;  Laterality: Right;    SUBTOTAL HYSTERECTOMY      TOTAL THYROIDECTOMY           FAMILY HISTORY  Family History   Problem Relation Age of Onset    Rheum arthritis Mother     Malig Hyperthermia Neg Hx          SOCIAL HISTORY  Social History     Socioeconomic History    Marital status:    Tobacco Use    Smoking status: Former     Current packs/day: 0.00     Types: Cigarettes     Start date: 2011     Quit date: 2018     Years since quittin.7    Smokeless tobacco: Never    Tobacco comments:     3-4 CIGARETTES PER DAY   Vaping Use    Vaping status: Never Used   Substance and Sexual Activity    Alcohol use: No    Drug use: No    Sexual activity: Defer         ALLERGIES  Shellfish allergy      REVIEW OF SYSTEMS  Review of Systems   Constitutional:  Negative for fever.   Respiratory:  Negative for shortness of breath.    Cardiovascular:  Positive for chest pain.   All other systems reviewed and are negative.    Included in HPI  All systems reviewed and negative except for those discussed in HPI.      PHYSICAL EXAM    I have reviewed the triage vital signs and nursing notes.    ED Triage Vitals   Temp Heart Rate Resp BP SpO2   10/04/24 1213 10/04/24 1145 10/04/24 1213 10/04/24 1145 10/04/24 1145   97.1 °F (36.2 °C) 99 24 143/78 97 %      Temp src Heart Rate Source Patient Position BP Location FiO2 (%)   10/04/24 1213 -- -- -- --   Tympanic           Physical Exam  GENERAL: Alert well-appearing female no obvious distress.  Triage vitals reviewed and notable for blood pressure 143/78.  Pulse 99.  Temperature and O2 sats are benign.  Patient is tearful at times related to ongoing pain in chest.  SKIN: Warm, dry  HENT: Normocephalic, atraumatic  EYES: no scleral icterus  CV: regular rhythm, regular rate-no murmur  RESPIRATORY: normal effort, lungs clear  ABDOMEN: soft, mild epigastric tenderness without rebound or  guarding, nondistended  MUSCULOSKELETAL: no deformity  NEURO: alert, moves all extremities, follows commands      LAB RESULTS  Recent Results (from the past 24 hour(s))   ECG 12 Lead Chest Pain    Collection Time: 10/04/24 11:55 AM   Result Value Ref Range    QT Interval 356 ms    QTC Interval 434 ms   Protime-INR    Collection Time: 10/04/24 12:17 PM    Specimen: Arm, Right; Blood   Result Value Ref Range    Protime 15.1 (H) 11.7 - 14.2 Seconds    INR 1.16 (H) 0.90 - 1.10   D-dimer, Quantitative    Collection Time: 10/04/24 12:17 PM    Specimen: Arm, Right; Blood   Result Value Ref Range    D-Dimer, Quantitative 0.59 (H) 0.00 - 0.53 MCGFEU/mL   CBC Auto Differential    Collection Time: 10/04/24 12:17 PM    Specimen: Arm, Right; Blood   Result Value Ref Range    WBC 7.62 3.40 - 10.80 10*3/mm3    RBC 3.91 3.77 - 5.28 10*6/mm3    Hemoglobin 11.0 (L) 12.0 - 15.9 g/dL    Hematocrit 35.2 34.0 - 46.6 %    MCV 90.0 79.0 - 97.0 fL    MCH 28.1 26.6 - 33.0 pg    MCHC 31.3 (L) 31.5 - 35.7 g/dL    RDW 13.6 12.3 - 15.4 %    RDW-SD 44.4 37.0 - 54.0 fl    MPV 11.3 6.0 - 12.0 fL    Platelets 204 140 - 450 10*3/mm3    Neutrophil % 50.1 42.7 - 76.0 %    Lymphocyte % 39.4 19.6 - 45.3 %    Monocyte % 9.4 5.0 - 12.0 %    Eosinophil % 0.4 0.3 - 6.2 %    Basophil % 0.3 0.0 - 1.5 %    Immature Grans % 0.4 0.0 - 0.5 %    Neutrophils, Absolute 3.82 1.70 - 7.00 10*3/mm3    Lymphocytes, Absolute 3.00 0.70 - 3.10 10*3/mm3    Monocytes, Absolute 0.72 0.10 - 0.90 10*3/mm3    Eosinophils, Absolute 0.03 0.00 - 0.40 10*3/mm3    Basophils, Absolute 0.02 0.00 - 0.20 10*3/mm3    Immature Grans, Absolute 0.03 0.00 - 0.05 10*3/mm3    nRBC 0.0 0.0 - 0.2 /100 WBC   Comprehensive Metabolic Panel    Collection Time: 10/04/24  1:01 PM    Specimen: Hand, Right; Blood   Result Value Ref Range    Glucose 87 65 - 99 mg/dL    BUN 12 6 - 20 mg/dL    Creatinine 0.82 0.57 - 1.00 mg/dL    Sodium 138 136 - 145 mmol/L    Potassium 3.6 3.5 - 5.2 mmol/L    Chloride 102 98  - 107 mmol/L    CO2 25.2 22.0 - 29.0 mmol/L    Calcium 9.0 8.6 - 10.5 mg/dL    Total Protein 7.0 6.0 - 8.5 g/dL    Albumin 4.0 3.5 - 5.2 g/dL    ALT (SGPT) 13 1 - 33 U/L    AST (SGOT) 16 1 - 32 U/L    Alkaline Phosphatase 63 39 - 117 U/L    Total Bilirubin 0.4 0.0 - 1.2 mg/dL    Globulin 3.0 gm/dL    A/G Ratio 1.3 g/dL    BUN/Creatinine Ratio 14.6 7.0 - 25.0    Anion Gap 10.8 5.0 - 15.0 mmol/L    eGFR 85.7 >60.0 mL/min/1.73   High Sensitivity Troponin T    Collection Time: 10/04/24  1:01 PM    Specimen: Hand, Right; Blood   Result Value Ref Range    HS Troponin T <6 <14 ng/L   High Sensitivity Troponin T 2Hr    Collection Time: 10/04/24  2:53 PM    Specimen: Arm, Right; Blood   Result Value Ref Range    HS Troponin T 9 <14 ng/L    Troponin T Delta           RADIOLOGY  CT Angiogram Chest    Result Date: 10/4/2024  CT ANGIOGRAM OF THE CHEST. MULTIPLE CORONAL, SAGITTAL, AND 3-D RECONSTRUCTIONS.  HISTORY: 54-year-old female with chest pain and elevated D-dimer.  TECHNIQUE: Radiation dose reduction techniques were utilized, including automated exposure control and exposure modulation based on body size. CT angiogram of the chest was performed with 2mm images following the administration of IV contrast. Multiple coronal, sagittal, and 3-D reconstruction images were obtained. No previous chest CT for comparison.  FINDINGS: 1. There is no evidence for pulmonary thromboemboli.  2. There are no airspace consolidations or focal opacities and there are no pleural or pericardial effusions. There is no lymphadenopathy within the chest. There is no hiatal hernia. No acute abnormality seen at the visualized upper abdomen.      XR Chest 1 View    Result Date: 10/4/2024  XR CHEST 1 VW-  HISTORY: Female who is 54 years-old, chest pain  TECHNIQUE: Frontal view of the chest  COMPARISON: 10/2/2020  FINDINGS: Heart, mediastinum and pulmonary vasculature are unremarkable. No focal pulmonary consolidation, pleural effusion, or pneumothorax.  No acute osseous process.      No focal pulmonary consolidation. Follow-up as clinical indications persist.  This report was finalized on 10/4/2024 1:02 PM by Dr. Jorge Bass M.D on Workstation: [id number]         MEDICATIONS GIVEN IN ER  Medications   aluminum-magnesium hydroxide-simethicone (MAALOX MAX) 400-400-40 MG/5ML suspension 15 mL (15 mL Oral Given 10/4/24 1236)   pantoprazole (PROTONIX) injection 80 mg (80 mg Intravenous Given 10/4/24 1235)   droperidol (INAPSINE) injection 2.5 mg (2.5 mg Intravenous Given 10/4/24 1522)   iopamidol (ISOVUE-370) 76 % injection 100 mL (95 mL Intravenous Given by Other 10/4/24 1513)         ORDERS PLACED DURING THIS VISIT:  Orders Placed This Encounter   Procedures    XR Chest 1 View    CT Angiogram Chest    Comprehensive Metabolic Panel    Protime-INR    D-dimer, Quantitative    High Sensitivity Troponin T    CBC Auto Differential    High Sensitivity Troponin T 2Hr    ECG 12 Lead Chest Pain    CBC & Differential         OUTPATIENT MEDICATION MANAGEMENT:  No current Epic-ordered facility-administered medications on file.     Current Outpatient Medications Ordered in Epic   Medication Sig Dispense Refill    famotidine (Pepcid) 20 MG tablet Take 1 tablet by mouth 2 (Two) Times a Day As Needed for Indigestion or Heartburn. 60 tablet 0    albuterol sulfate  (90 Base) MCG/ACT inhaler Inhale 2 puffs Every 4 (Four) Hours As Needed for Wheezing or Shortness of Air. 6.7 g 12    amitriptyline (ELAVIL) 10 MG tablet Take 1 tablet by mouth every night at bedtime. 30 tablet 0    amitriptyline (ELAVIL) 10 MG tablet Take 1 tablet by mouth every night at bedtime as needed 30 tablet 0    methocarbamol (ROBAXIN) 500 MG tablet Take 1 tablet by mouth 4 (Four) Times a Day. 60 tablet 1    pantoprazole (PROTONIX) 40 MG EC tablet Take 1 tablet by mouth 2 (two) times a day. 60 tablet 5    promethazine-dextromethorphan (PROMETHAZINE-DM) 6.25-15 MG/5ML syrup 5 ml po 4x daily as needed cough  "180 mL 0    sucralfate (CARAFATE) 1 g tablet Take 1 tablet by mouth 4 (Four) Times a Day With Meals & at Bedtime. (Patient taking differently: Take 1 g by mouth As Needed.) 120 tablet 3         PROCEDURES  Procedures            PROGRESS, DATA ANALYSIS, CONSULTS, AND MEDICAL DECISION MAKING  All labs have been independently interpreted by me.  All radiology studies have been reviewed by me. All EKG's have been independently viewed and interpreted by me.  Discussion below represents my analysis of pertinent findings related to patient's condition, differential diagnosis, treatment plan and final disposition.    Differential diagnosis includes but is not limited to GI related chest pain, acute coronary syndrome, aortic dissection.      ED Course as of 10/04/24 1555   Fri Oct 04, 2024   1211 EKG independently interpreted by me    Time 11:55 AM  Sinus 89  Normal P waves and SD intervals  QRS-normal axis, normal QRS  ST, T waves-unremarkable  Not significant change when compared to 2020 [DB]   1226 Patient looks pretty uncomfortable.  EKG is reassuring and patient is insisting that this is \"heartburn\".  Will go ahead and try GI cocktail as well as some IV Protonix. [DB]   1348 High-sensitivity troponin is undetectable at less than 6 which would go against acute coronary syndrome.    D-dimer is mildly elevated at 0.59 increasing concern for aortic dissection or pulmonary embolism.    CBC is benign without evidence of infection or significant anemia, hemoglobin 11.    Chemistries unremarkable with normal liver test, kidney test and electrolytes. [DB]   1349 Chest x-ray independently interpreted by me shows no obvious acute disease. [DB]   1351 Patient had marked improvement after GI cocktail and Protonix.  She is resting easily in no obvious distress. [DB]   1459 Patient had recurrence of pain.  She is anxious and crying.  Will go ahead and give some IV droperidol to see if we can help with pain and anxiety. [DB]   1530 " Repeat troponin remains normal at 9 undergo against acute coronary syndrome. [DB]   1530 CT imaging of the chest independently interpreted by me shows no obvious pulmonary embolism or acute pathology.  Awaiting official radiology interpretation. [DB]   1544 CAT scan of the chest interpreted by me shows no obvious pulmonary embolism.  No significant aortic disease noted such as dissection or aneurysm. [DB]   1550 On repeat evaluation patient is feeling much better and is completely pain-free at this time.    She has had negative troponin x 2 and a negative CTA of the chest.  I suspect symptoms are related to severe esophagitis.  Patient does continue to take NSAIDs and we will have her hold off on meloxicam for the next week or 2.  She has not been taking Pepcid but states she is still taking Carafate and omeprazole.  Will refill her Pepcid and asked her to follow-up with her GI provider at [facility]. [DB]      ED Course User Index  [DB] gL Helm MD             AS OF 15:55 EDT VITALS:    BP - 113/85  HR - 91  TEMP - 97.5 °F (36.4 °C) (Oral)  O2 SATS - 98%    COMPLEXITY OF CARE  54-year-old female with history of chronic pain, esophagitis and GERD and obesity presents with abrupt onset of burning chest and upper abdominal pain.    Please see ED course above for my independent valuation interpretation of ED testing including EKG, x-ray and CT imaging.  There is initial concern for acute coronary syndrome, aortic dissection or pulmonary embolism.  Patient did have normal high-sensitivity troponin x 2 and benign EKG.  She did have elevated D-dimer and underwent CT angiogram to rule out PE and aortic dissection.  CT angiogram unremarkable.    Patient did have pretty significant pain relief after GI cocktail.  She had a retainer of some pain and that was resolved after IV droperidol.  Currently pain-free with fairly benign workup as detailed above.  Will continue omeprazole and Carafate which she is  taking, add Pepcid which she has taken in the past.  Asked her to avoid meloxicam.      DIAGNOSIS  Final diagnoses:   Upper abdominal pain   Elevated d-dimer         DISPOSITION  ED Disposition       ED Disposition   Discharge    Condition   Stable    Comment   --                Please note that portions of this document were completed with a voice recognition program.    Note Disclaimer: At [facility], we believe that sharing information builds trust and better relationships. You are receiving this note because you recently visited [facility]. It is possible you will see health information before a provider has talked with you about it. This kind of information can be easy to misunderstand. To help you fully understand what it means for your health, we urge you to discuss this note with your provider.         Lg Helm MD  10/04/24 [id number]

## 2024-10-04 NOTE — DISCHARGE INSTRUCTIONS
EKG, CAT scan and blood test did not show any serious heart lung or vascular problems.  I suspect pain is related to your stomach and esophagus.    I would ask you to continue omeprazole and Carafate as directed.  I have refilled Pepcid to decrease acid in the stomach.  I would prefer if she were to stop meloxicam/Mobic which is used for arthritis but can irritate the stomach.

## (undated) DEVICE — INTENT TO BE USED WITH SUTURE MATERIAL FOR TISSUE CLOSURE: Brand: RICHARD-ALLAN® NEEDLE 1/2 CIRCLE TAPER

## (undated) DEVICE — PK ARTHSCP 40

## (undated) DEVICE — SKIN PREP TRAY W/CHG: Brand: MEDLINE INDUSTRIES, INC.

## (undated) DEVICE — UNDYED BRAIDED (POLYGLACTIN 910), SYNTHETIC ABSORBABLE SUTURE: Brand: COATED VICRYL

## (undated) DEVICE — 3M™ STERI-STRIP™ REINFORCED ADHESIVE SKIN CLOSURES, R1547, 1/2 IN X 4 IN (12 MM X 100 MM), 6 STRIPS/ENVELOPE: Brand: 3M™ STERI-STRIP™

## (undated) DEVICE — SENSR O2 OXIMAX FNGR A/ 18IN NONSTR

## (undated) DEVICE — LN SMPL CO2 SHTRM SD STREAM W/M LUER

## (undated) DEVICE — DRSNG GZ PETROLTM XEROFORM CURAD 1X8IN STRL

## (undated) DEVICE — FRCP BX RADJAW4 NDL 2.8 240CM LG OG BX40

## (undated) DEVICE — GLV SURG TRIUMPH CLASSIC PF LTX 8 STRL

## (undated) DEVICE — BNDG ESMARK STRL 6INX12FT LF

## (undated) DEVICE — KT POSTN ARM TRIMANO BEACH CHR W/DRP

## (undated) DEVICE — GLV SURG BIOGEL LTX PF 6 1/2

## (undated) DEVICE — TRAP FLD MINIVAC MEGADYNE 100ML

## (undated) DEVICE — INTENDED FOR TISSUE SEPARATION, AND OTHER PROCEDURES THAT REQUIRE A SHARP SURGICAL BLADE TO PUNCTURE OR CUT.: Brand: BARD-PARKER ® CARBON RIB-BACK BLADES

## (undated) DEVICE — THE SINGLE USE ETRAP – POLYP TRAP IS USED FOR SUCTION RETRIEVAL OF ENDOSCOPICALLY REMOVED POLYPS.: Brand: ETRAP

## (undated) DEVICE — TP NDL SCORPION MULTIFIRE

## (undated) DEVICE — ADAPT CLN BIOGUARD AIR/H2O DISP

## (undated) DEVICE — DRSNG WND GZ CURAD OIL EMULSION 3X3IN STRL

## (undated) DEVICE — CANN O2 ETCO2 FITS ALL CONN CO2 SMPL A/ 7IN DISP LF

## (undated) DEVICE — PK ARTHSCP SHLDR TOWER 40

## (undated) DEVICE — PAD GRND REM POLYHESIVE A/ DISP

## (undated) DEVICE — TUBING, SUCTION, 1/4" X 10', STRAIGHT: Brand: MEDLINE

## (undated) DEVICE — BLD SHAVER BONECUTTER 5MM 13CM

## (undated) DEVICE — THE TORRENT IRRIGATION SCOPE CONNECTOR IS USED WITH THE TORRENT IRRIGATION TUBING TO PROVIDE IRRIGATION FLUIDS SUCH AS STERILE WATER DURING GASTROINTESTINAL ENDOSCOPIC PROCEDURES WHEN USED IN CONJUNCTION WITH AN IRRIGATION PUMP (OR ELECTROSURGICAL UNIT).: Brand: TORRENT

## (undated) DEVICE — SUT ETHLN 3/0 PC5 18IN 1893G

## (undated) DEVICE — KT ORCA ORCAPOD DISP STRL

## (undated) DEVICE — TBG ARTHSCP PT W CONN/REDUC 8FT

## (undated) DEVICE — 4.5 MM FULL RADIUS STRAIGHT                                    BLADES, POWER/EP-1, YELLOW, PACKAGED                                    6 PER BOX, STERILE: Brand: DYONICS

## (undated) DEVICE — GLV SURG SENSICARE POLYISPRN W/ALOE PF LF 6.5 GRN STRL

## (undated) DEVICE — BITEBLOCK OMNI BLOC

## (undated) DEVICE — PENCL E/S ULTRAVAC TELESCP NOSE HOLSTR 10FT

## (undated) DEVICE — TBG ARTHSCP PUMP W CONN/REDUC 8FT

## (undated) DEVICE — PATIENT RETURN ELECTRODE, SINGLE-USE, CONTACT QUALITY MONITORING, ADULT, WITH 9FT CORD, FOR PATIENTS WEIGING OVER 33LBS. (15KG): Brand: MEGADYNE

## (undated) DEVICE — ABL ASP APOLLO RF XL 90D

## (undated) DEVICE — UNDERCAST PADDING: Brand: DEROYAL

## (undated) DEVICE — SUT VIC 2/0 CT2 27IN J269H

## (undated) DEVICE — GLV SURG BIOGEL LTX PF 8

## (undated) DEVICE — PENCL E/S HNDSWCH ROCKR CB

## (undated) DEVICE — DISPOSABLE TOURNIQUET CUFF SINGLE BLADDER, SINGLE PORT AND QUICK CONNECT CONNECTOR: Brand: COLOR CUFF